# Patient Record
Sex: FEMALE | Race: OTHER | HISPANIC OR LATINO | Employment: OTHER | URBAN - METROPOLITAN AREA
[De-identification: names, ages, dates, MRNs, and addresses within clinical notes are randomized per-mention and may not be internally consistent; named-entity substitution may affect disease eponyms.]

---

## 2017-03-07 ENCOUNTER — LAB REQUISITION (OUTPATIENT)
Dept: LAB | Facility: HOSPITAL | Age: 52
End: 2017-03-07
Payer: COMMERCIAL

## 2017-03-07 ENCOUNTER — ALLSCRIPTS OFFICE VISIT (OUTPATIENT)
Dept: OTHER | Facility: OTHER | Age: 52
End: 2017-03-07

## 2017-03-07 DIAGNOSIS — C50.919 MALIGNANT NEOPLASM OF FEMALE BREAST (HCC): ICD-10-CM

## 2017-03-07 DIAGNOSIS — Z01.419 ENCOUNTER FOR GYNECOLOGICAL EXAMINATION WITHOUT ABNORMAL FINDING: ICD-10-CM

## 2017-03-07 PROCEDURE — 87624 HPV HI-RISK TYP POOLED RSLT: CPT | Performed by: OBSTETRICS & GYNECOLOGY

## 2017-03-07 PROCEDURE — 88175 CYTOPATH C/V AUTO FLUID REDO: CPT | Performed by: OBSTETRICS & GYNECOLOGY

## 2017-03-13 LAB — HPV RRNA GENITAL QL NAA+PROBE: NORMAL

## 2017-03-15 LAB
LAB AP GYN PRIMARY INTERPRETATION: NORMAL
Lab: NORMAL

## 2017-11-20 ENCOUNTER — APPOINTMENT (OUTPATIENT)
Dept: LAB | Facility: CLINIC | Age: 52
End: 2017-11-20
Payer: COMMERCIAL

## 2017-11-20 DIAGNOSIS — Z79.899 ENCOUNTER FOR LONG-TERM (CURRENT) USE OF MEDICATIONS: ICD-10-CM

## 2017-11-20 DIAGNOSIS — F41.1 GENERALIZED ANXIETY DISORDER: ICD-10-CM

## 2017-11-20 DIAGNOSIS — F31.32 BIPOLAR I DISORDER, MOST RECENT EPISODE DEPRESSED, MODERATE (HCC): Primary | ICD-10-CM

## 2017-11-20 LAB
25(OH)D3 SERPL-MCNC: 11 NG/ML (ref 30–100)
ALBUMIN SERPL BCP-MCNC: 3 G/DL (ref 3.5–5)
ALP SERPL-CCNC: 95 U/L (ref 46–116)
ALT SERPL W P-5'-P-CCNC: 29 U/L (ref 12–78)
ANION GAP SERPL CALCULATED.3IONS-SCNC: 7 MMOL/L (ref 4–13)
AST SERPL W P-5'-P-CCNC: 30 U/L (ref 5–45)
BASOPHILS # BLD AUTO: 0.03 THOUSANDS/ΜL (ref 0–0.1)
BASOPHILS NFR BLD AUTO: 0 % (ref 0–1)
BILIRUB SERPL-MCNC: 0.3 MG/DL (ref 0.2–1)
BUN SERPL-MCNC: 15 MG/DL (ref 5–25)
CALCIUM SERPL-MCNC: 8.9 MG/DL (ref 8.3–10.1)
CHLORIDE SERPL-SCNC: 101 MMOL/L (ref 100–108)
CHOLEST SERPL-MCNC: 148 MG/DL (ref 50–200)
CO2 SERPL-SCNC: 30 MMOL/L (ref 21–32)
CREAT SERPL-MCNC: 1.14 MG/DL (ref 0.6–1.3)
EOSINOPHIL # BLD AUTO: 0.17 THOUSAND/ΜL (ref 0–0.61)
EOSINOPHIL NFR BLD AUTO: 2 % (ref 0–6)
ERYTHROCYTE [DISTWIDTH] IN BLOOD BY AUTOMATED COUNT: 14 % (ref 11.6–15.1)
FERRITIN SERPL-MCNC: 70 NG/ML (ref 8–388)
FOLATE SERPL-MCNC: >20 NG/ML (ref 3.1–17.5)
GFR SERPL CREATININE-BSD FRML MDRD: 55 ML/MIN/1.73SQ M
GLUCOSE P FAST SERPL-MCNC: 63 MG/DL (ref 65–99)
GLUCOSE P FAST SERPL-MCNC: 64 MG/DL (ref 65–99)
HCT VFR BLD AUTO: 34.2 % (ref 34.8–46.1)
HDLC SERPL-MCNC: 35 MG/DL (ref 40–60)
HGB BLD-MCNC: 11.3 G/DL (ref 11.5–15.4)
IRON SATN MFR SERPL: 12 %
IRON SERPL-MCNC: 48 UG/DL (ref 50–170)
LDLC SERPL CALC-MCNC: 60 MG/DL (ref 0–100)
LYMPHOCYTES # BLD AUTO: 2.73 THOUSANDS/ΜL (ref 0.6–4.47)
LYMPHOCYTES NFR BLD AUTO: 29 % (ref 14–44)
MCH RBC QN AUTO: 30 PG (ref 26.8–34.3)
MCHC RBC AUTO-ENTMCNC: 33 G/DL (ref 31.4–37.4)
MCV RBC AUTO: 91 FL (ref 82–98)
MONOCYTES # BLD AUTO: 0.63 THOUSAND/ΜL (ref 0.17–1.22)
MONOCYTES NFR BLD AUTO: 7 % (ref 4–12)
NEUTROPHILS # BLD AUTO: 6 THOUSANDS/ΜL (ref 1.85–7.62)
NEUTS SEG NFR BLD AUTO: 62 % (ref 43–75)
NRBC BLD AUTO-RTO: 0 /100 WBCS
PLATELET # BLD AUTO: 294 THOUSANDS/UL (ref 149–390)
PMV BLD AUTO: 10.6 FL (ref 8.9–12.7)
POTASSIUM SERPL-SCNC: 3.4 MMOL/L (ref 3.5–5.3)
PROT SERPL-MCNC: 7.5 G/DL (ref 6.4–8.2)
RBC # BLD AUTO: 3.77 MILLION/UL (ref 3.81–5.12)
RETICS # AUTO: NORMAL 10*3/UL (ref 14097–95744)
RETICS # CALC: 1.76 % (ref 0.37–1.87)
SODIUM SERPL-SCNC: 138 MMOL/L (ref 136–145)
T3 SERPL-MCNC: 1.4 NG/ML (ref 0.6–1.8)
T4 FREE SERPL-MCNC: 0.89 NG/DL (ref 0.76–1.46)
TIBC SERPL-MCNC: 391 UG/DL (ref 250–450)
TRANSFERRIN SERPL-MCNC: 322 MG/DL (ref 200–400)
TRIGL SERPL-MCNC: 264 MG/DL
TSH SERPL DL<=0.05 MIU/L-ACNC: 7.22 UIU/ML (ref 0.36–3.74)
VIT B12 SERPL-MCNC: 405 PG/ML (ref 100–900)
WBC # BLD AUTO: 9.58 THOUSAND/UL (ref 4.31–10.16)

## 2017-11-20 PROCEDURE — 82746 ASSAY OF FOLIC ACID SERUM: CPT

## 2017-11-20 PROCEDURE — 84443 ASSAY THYROID STIM HORMONE: CPT

## 2017-11-20 PROCEDURE — 80074 ACUTE HEPATITIS PANEL: CPT

## 2017-11-20 PROCEDURE — 85025 COMPLETE CBC W/AUTO DIFF WBC: CPT

## 2017-11-20 PROCEDURE — 83550 IRON BINDING TEST: CPT

## 2017-11-20 PROCEDURE — 80053 COMPREHEN METABOLIC PANEL: CPT

## 2017-11-20 PROCEDURE — 84439 ASSAY OF FREE THYROXINE: CPT

## 2017-11-20 PROCEDURE — 82607 VITAMIN B-12: CPT

## 2017-11-20 PROCEDURE — 82306 VITAMIN D 25 HYDROXY: CPT

## 2017-11-20 PROCEDURE — 83540 ASSAY OF IRON: CPT

## 2017-11-20 PROCEDURE — 80307 DRUG TEST PRSMV CHEM ANLYZR: CPT

## 2017-11-20 PROCEDURE — 82951 GLUCOSE TOLERANCE TEST (GTT): CPT

## 2017-11-20 PROCEDURE — 84466 ASSAY OF TRANSFERRIN: CPT

## 2017-11-20 PROCEDURE — 82728 ASSAY OF FERRITIN: CPT

## 2017-11-20 PROCEDURE — 36415 COLL VENOUS BLD VENIPUNCTURE: CPT

## 2017-11-20 PROCEDURE — 80061 LIPID PANEL: CPT

## 2017-11-20 PROCEDURE — 84480 ASSAY TRIIODOTHYRONINE (T3): CPT

## 2017-11-20 PROCEDURE — 85045 AUTOMATED RETICULOCYTE COUNT: CPT

## 2017-11-21 LAB
HAV IGM SER QL: NORMAL
HBV CORE IGM SER QL: NORMAL
HBV SURFACE AG SER QL: NORMAL
HCV AB SER QL: NORMAL

## 2017-11-25 LAB
AMPHETAMINES UR QL SCN: NEGATIVE NG/ML
BARBITURATES UR QL SCN: NEGATIVE NG/ML
BENZODIAZ UR QL SCN: NEGATIVE NG/ML
BZE UR QL SCN: NEGATIVE NG/ML
CANNABINOIDS UR QL SCN: NEGATIVE NG/ML
METHADONE UR QL SCN: POSITIVE
OPIATES UR QL: NEGATIVE NG/ML
PCP UR QL: NEGATIVE NG/ML
PROPOXYPH UR QL: NEGATIVE NG/ML

## 2018-01-11 NOTE — MISCELLANEOUS
Provider Comments  Provider Comments:   Patient was a no show for appt today, called and spoke with patient she said she was not feeling good and would call us back when she could reschedule        Signatures   Electronically signed by : Melyssa Mann MD; Apr 25 2016  7:22AM EST

## 2018-01-12 VITALS
HEIGHT: 62 IN | DIASTOLIC BLOOD PRESSURE: 86 MMHG | SYSTOLIC BLOOD PRESSURE: 145 MMHG | BODY MASS INDEX: 31.1 KG/M2 | WEIGHT: 169 LBS | HEART RATE: 67 BPM

## 2018-01-22 ENCOUNTER — HOSPITAL ENCOUNTER (EMERGENCY)
Facility: HOSPITAL | Age: 53
Discharge: HOME | End: 2018-01-22
Admitting: EMERGENCY MEDICINE
Payer: COMMERCIAL

## 2018-01-22 VITALS
BODY MASS INDEX: 32.92 KG/M2 | DIASTOLIC BLOOD PRESSURE: 72 MMHG | WEIGHT: 180 LBS | HEART RATE: 70 BPM | TEMPERATURE: 98.2 F | SYSTOLIC BLOOD PRESSURE: 160 MMHG | OXYGEN SATURATION: 98 % | RESPIRATION RATE: 20 BRPM

## 2018-01-22 DIAGNOSIS — J40 BRONCHITIS: ICD-10-CM

## 2018-01-22 DIAGNOSIS — J45.901 ASTHMA EXACERBATION: Primary | ICD-10-CM

## 2018-01-22 PROCEDURE — 99283 EMERGENCY DEPT VISIT LOW MDM: CPT

## 2018-01-22 PROCEDURE — 94640 AIRWAY INHALATION TREATMENT: CPT

## 2018-01-22 RX ORDER — PREDNISONE 50 MG/1
50 TABLET ORAL DAILY
Qty: 4 TABLET | Refills: 0 | Status: SHIPPED | OUTPATIENT
Start: 2018-01-22 | End: 2018-11-30

## 2018-01-22 RX ORDER — ALBUTEROL SULFATE 90 UG/1
2 AEROSOL, METERED RESPIRATORY (INHALATION) EVERY 6 HOURS PRN
Qty: 1 INHALER | Refills: 0 | Status: SHIPPED | OUTPATIENT
Start: 2018-01-22

## 2018-01-22 RX ORDER — ALBUTEROL SULFATE 1.25 MG/3ML
1 SOLUTION RESPIRATORY (INHALATION) EVERY 6 HOURS PRN
Qty: 75 ML | Refills: 0 | Status: SHIPPED | OUTPATIENT
Start: 2018-01-22

## 2018-01-22 RX ADMIN — PREDNISONE 50 MG: 20 TABLET ORAL at 12:27

## 2018-01-22 RX ADMIN — ALBUTEROL SULFATE 2.5 MG: 2.5 SOLUTION RESPIRATORY (INHALATION) at 12:26

## 2018-01-22 RX ADMIN — IPRATROPIUM BROMIDE 0.5 MG: 0.5 SOLUTION RESPIRATORY (INHALATION) at 12:26

## 2018-01-22 NOTE — ED PROVIDER NOTES
History  Chief Complaint   Patient presents with    Asthma     Patient reports x2 days of worsening asthma symtoms and wheezing, patient reports she ran out of medications so she has not tried any at-home therapy; pt  denies fevers     64-year-old female with past medical history of asthma, hypertension, breast cancer, presents emergency department for chest tightness and wheezing for the past 2 days  Also endorses productive cough that started 2 days ago  Denies fevers, chills, bilateral ear pain, sore throat, facial pain, headaches, dizziness, chest pain, shortness of breath, palpitations, nausea, vomiting, diarrhea, abdominal pain  States that this is consistent with her asthma exacerbations in the past   She has never been intubated or hospitalized in the ICU for her asthma  History provided by:  Patient   used: No        None       Past Medical History:   Diagnosis Date    Asthma     Cancer ([facility] [address] )     breast cancer    Hypertension     Psychiatric disorder     depression and anxiety       History reviewed  No pertinent surgical history  History reviewed  No pertinent family history  I have reviewed and agree with the history as documented  Social History   Substance Use Topics    Smoking status: Current Every Day Smoker     Packs/day: 0 50     Types: Cigarettes    Smokeless tobacco: Never Used    Alcohol use No        Review of Systems   Constitutional: Negative for chills and fever  HENT: Negative  Respiratory: Positive for cough, chest tightness and wheezing  Negative for shortness of breath  Cardiovascular: Negative for chest pain, palpitations and leg swelling  Gastrointestinal: Negative  Musculoskeletal: Negative  Neurological: Negative          Physical Exam  ED Triage Vitals [01/22/18 1123]   Temperature Pulse Respirations Blood Pressure SpO2   98 2 °F (36 8 °C) 70 20 160/72 98 %      Temp Source Heart Rate Source Patient Position - Orthostatic VS BP Location FiO2 (%)   Oral Monitor Sitting Right arm --      Pain Score       No Pain           Orthostatic Vital Signs  Vitals:    01/22/18 1123   BP: 160/72   Pulse: 70   Patient Position - Orthostatic VS: Sitting       Physical Exam   Constitutional: She is oriented to person, place, and time  She appears well-developed and well-nourished  HENT:   Mouth/Throat: Oropharynx is clear and moist    Eyes: Conjunctivae and EOM are normal  Pupils are equal, round, and reactive to light  Neck: Normal range of motion  Neck supple  Cardiovascular: Normal rate, regular rhythm and intact distal pulses  Pulmonary/Chest: Effort normal  She has wheezes (Bilateral expiratory)  She has no rales  Abdominal: Soft  Bowel sounds are normal  She exhibits no distension  There is no tenderness  There is no rebound and no guarding  Musculoskeletal: Normal range of motion  She exhibits no edema or tenderness  Neurological: She is alert and oriented to person, place, and time  No cranial nerve deficit or sensory deficit  She exhibits normal muscle tone  Coordination normal    Skin: Skin is warm and dry  Capillary refill takes less than 2 seconds  Psychiatric: She has a normal mood and affect  Her behavior is normal    Nursing note and vitals reviewed        ED Medications  Medications   albuterol inhalation solution 2 5 mg (2 5 mg Nebulization Given 1/22/18 1226)   ipratropium (ATROVENT) 0 02 % inhalation solution 0 5 mg (0 5 mg Nebulization Given 1/22/18 1226)   predniSONE tablet 50 mg (50 mg Oral Given 1/22/18 1227)       Diagnostic Studies  Results Reviewed     None                 No orders to display              Procedures  Procedures       Phone Contacts  ED Phone Contact    ED Course  ED Course                                MDM  Number of Diagnoses or Management Options  Diagnosis management comments: 58-year-old female with past medical history of asthma, hypertension, breast cancer, presents emergency department for chest tightness and wheezing for the past 2 days  Differential Diagnosis includes but is not limited to:  Asthma exacerbation, bronchitis, viral URI, low suspicion for pneumonia  Given DuoNeb in the emergency department with prednisone  Patient feels much improved and will be discharged home with follow up primary care follow-up  CritCare Time    Disposition  Final diagnoses:   Asthma exacerbation   Bronchitis     Time reflects when diagnosis was documented in both MDM as applicable and the Disposition within this note     Time User Action Codes Description Comment    1/22/2018 12:47 PM Caro Schafer [W67 [id number]] Asthma exacerbation     1/22/2018 12:47 PM Brittnee, [address] Bronchitis       ED Disposition     ED Disposition Condition Comment    Discharge  Avanell Race discharge to home/self care  Condition at discharge: Good        Follow-up Information     Follow up With Specialties Details Why Contact Info    Gene Necessary, DO Internal Medicine In 3 days  [address] [facility]  [phone]          Patient's Medications   Discharge Prescriptions    ALBUTEROL (PROVENTIL HFA,VENTOLIN HFA) 90 MCG/ACT INHALER    Inhale 2 puffs every 6 (six) hours as needed for wheezing       Start Date: 1/22/2018 End Date: --       Order Dose: 2 puffs       Quantity: 1 Inhaler    Refills: 0    PREDNISONE 50 MG TABLET    Take 1 tablet by mouth daily       Start Date: 1/22/2018 End Date: --       Order Dose: 50 mg       Quantity: 4 tablet    Refills: 0     No discharge procedures on file      ED Provider  Electronically Signed by           Alyce Kee PA-C  01/22/18 4784

## 2018-01-22 NOTE — DISCHARGE INSTRUCTIONS
Take 2 puffs of the albuterol inhaler every 4-6 hours as needed for shortness of breath and cough  Take prednisone 1 tablet every day for the next 4 days, beginning tomorrow  Follow-up with primary care doctor in 3-5 days to ensure improvement  Return to emergency department for fever, chills, worsening cough, worsening shortness breath, chest pain,   Or any other concerns  Acute Bronchitis   WHAT YOU NEED TO KNOW:   Acute bronchitis is swelling and irritation in the air passages of your lungs  This irritation may cause you to cough or have other breathing problems  Acute bronchitis often starts because of another illness, such as a cold or the flu  The illness spreads from your nose and throat to your windpipe and airways  Bronchitis is often called a chest cold  Acute bronchitis lasts about 3 to 6 weeks and is usually not a serious illness  Your cough can last for several weeks  DISCHARGE INSTRUCTIONS:   Return to the emergency department if:   · You cough up blood  · Your lips or fingernails turn blue  · You feel like you are not getting enough air when you breathe  Contact your healthcare provider if:   · You have a fever  · Your breathing problems do not go away or get worse  · Your cough does not get better within 4 weeks  · You have questions or concerns about your condition or care  Self-care:   · Get more rest   Rest helps your body to heal  Slowly start to do more each day  Rest when you feel it is needed  · Avoid irritants in the air  Avoid chemicals, fumes, and dust  Wear a face mask if you must work around dust or fumes  Stay inside on days when air pollution levels are high  If you have allergies, stay inside when pollen counts are high  Do not use aerosol products, such as spray-on deodorant, bug spray, and hair spray  · Do not smoke or be around others who smoke    Nicotine and other chemicals in cigarettes and cigars damages the cilia that move mucus out of your lungs  Ask your healthcare provider for information if you currently smoke and need help to quit  E-cigarettes or smokeless tobacco still contain nicotine  Talk to your healthcare provider before you use these products  · Drink liquids as directed  Liquids help keep your air passages moist and help you cough up mucus  You may need to drink more liquids when you have acute bronchitis  Ask how much liquid to drink each day and which liquids are best for you  · Use a humidifier or vaporizer  Use a cool mist humidifier or a vaporizer to increase air moisture in your home  This may make it easier for you to breathe and help decrease your cough  Decrease risk for acute bronchitis:   · Get the vaccinations you need  Ask your healthcare provider if you should get vaccinated against the flu or pneumonia  · Prevent the spread of germs  You can decrease your risk of acute bronchitis and other illnesses by doing the following:     [facility] your hands often with soap and water  Carry germ-killing hand lotion or gel with you  You can use the lotion or gel to clean your hands when soap and water are not available  ¨ Do not touch your eyes, nose, or mouth unless you have washed your hands first     ¨ Always cover your mouth when you cough to prevent the spread of germs  It is best to cough into a tissue or your shirt sleeve instead of into your hand  Ask those around you cover their mouths when they cough  ¨ Try to avoid people who have a cold or the flu  If you are sick, stay away from others as much as possible  Medicines: Your healthcare provider may  give you any of the following:  · Ibuprofen or acetaminophen  are medicines that help lower your fever  They are available without a doctor's order  Ask your healthcare provider which medicine is right for you  Ask how much to take and how often to take it  Follow directions  These medicines can cause stomach bleeding if not taken correctly   Ibuprofen can cause kidney damage  Do not take ibuprofen if you have kidney disease, an ulcer, or allergies to aspirin  Acetaminophen can cause liver damage  Do not take more than 4,000 milligrams in 24 hours  · Decongestants  help loosen mucus in your lungs and make it easier to cough up  This can help you breathe easier  · Cough suppressants  decrease your urge to cough  If your cough produces mucus, do not take a cough suppressant unless your healthcare provider tells you to  Your healthcare provider may suggest that you take a cough suppressant at night so you can rest     · Inhalers  may be given  Your healthcare provider may give you one or more inhalers to help you breathe easier and cough less  An inhaler gives your medicine to open your airways  Ask your healthcare provider to show you how to use your inhaler correctly  · Take your medicine as directed  Contact your healthcare provider if you think your medicine is not helping or if you have side effects  Tell him of her if you are allergic to any medicine  Keep a list of the medicines, vitamins, and herbs you take  Include the amounts, and when and why you take them  Bring the list or the pill bottles to follow-up visits  Carry your medicine list with you in case of an emergency  Follow up with your healthcare provider as directed:  Write down questions you have so you will remember to ask them during your follow-up visits  © 2017 [address] Information is for End User's use only and may not be sold, redistributed or otherwise used for commercial purposes  All illustrations and images included in CareNotes® are the copyrighted property of A D A M , Inc  or Jose E Gonzalez  The above information is an  only  It is not intended as medical advice for individual conditions or treatments  Talk to your doctor, nurse or pharmacist before following any medical regimen to see if it is safe and effective for you      Asthma, Ambulatory Care   GENERAL INFORMATION:   Asthma  is a lung disease that makes breathing difficult  Chronic inflammation and reactions to triggers narrow the airways in your lungs  Asthma can become life-threatening if it is not managed  Common symptoms include the following:   · Coughing     · Wheezing     · Shortness of breath     · Chest tightness  Seek immediate care for the following symptoms:   · Severe shortness of breath    · Blue or gray lips or nails    · Skin around your neck and ribs pulls in with each breath    · Shortness of breath, even after you take your short-term medicine as directed     · Peak flow numbers in the red zone of your asthma action plan  Treatment for asthma  will depend on how severe it is  Medicine may decrease inflammation, open airways, and make it easier to breathe  Medicines may be inhaled, taken as a pill, or injected  Short-term medicines relieve your symptoms quickly  Long-term medicines are used to prevent future attacks  You may also need medicine to help control your allergies  Manage and prevent future asthma attacks:   · Follow your asthma action pan  This is a written plan that you and your healthcare provider create  It explains which medicine you need and when to change doses if necessary  It also explains how you can monitor symptoms and use a peak flow meter  The meter measures how well your lungs are working  · Manage other health conditions , such as allergies, acid reflux, and sleep apnea  · Identify and avoid triggers  These may include pets, dust mites, mold, and cockroaches  · Do not smoke and avoid others who smoke  If you smoke, it is never too late to quit  Ask your healthcare provider if you need help quitting  · Ask about a flu vaccine  The flu can make your asthma worse  You may need a yearly flu shot  Follow up with your healthcare provider as directed:   You will need to return to make sure your medicine is working and your symptoms are controlled  You may be referred to an asthma or allergy specialist  Matteokunal Hope may be asked to keep a record of your peak flow values and bring it with you to your appointments  Write down your questions so you remember to ask them during your visits  CARE AGREEMENT:   You have the right to help plan your care  Learn about your health condition and how it may be treated  Discuss treatment options with your caregivers to decide what care you want to receive  You always have the right to refuse treatment  The above information is an  only  It is not intended as medical advice for individual conditions or treatments  Talk to your doctor, nurse or pharmacist before following any medical regimen to see if it is safe and effective for you  © 2014 [address] is for End User's use only and may not be sold, redistributed or otherwise used for commercial purposes  All illustrations and images included in CareNotes® are the copyrighted property of A D A M , Inc  or Jose E Gonzalez

## 2018-10-01 ENCOUNTER — APPOINTMENT (OUTPATIENT)
Dept: LAB | Facility: CLINIC | Age: 53
End: 2018-10-01
Payer: COMMERCIAL

## 2018-10-01 DIAGNOSIS — F31.32 MODERATE DEPRESSED BIPOLAR I DISORDER (HCC): ICD-10-CM

## 2018-10-01 DIAGNOSIS — Z79.899 ENCOUNTER FOR LONG-TERM (CURRENT) USE OF MEDICATIONS: Primary | ICD-10-CM

## 2018-10-01 LAB
25(OH)D3 SERPL-MCNC: 25 NG/ML (ref 30–100)
ALBUMIN SERPL BCP-MCNC: 3.1 G/DL (ref 3.5–5)
ALP SERPL-CCNC: 109 U/L (ref 46–116)
ALT SERPL W P-5'-P-CCNC: 85 U/L (ref 12–78)
ANION GAP SERPL CALCULATED.3IONS-SCNC: 6 MMOL/L (ref 4–13)
AST SERPL W P-5'-P-CCNC: 81 U/L (ref 5–45)
BASOPHILS # BLD AUTO: 0.02 THOUSANDS/ΜL (ref 0–0.1)
BASOPHILS NFR BLD AUTO: 0 % (ref 0–1)
BILIRUB SERPL-MCNC: 0.31 MG/DL (ref 0.2–1)
BUN SERPL-MCNC: 20 MG/DL (ref 5–25)
CALCIUM SERPL-MCNC: 8.9 MG/DL (ref 8.3–10.1)
CHLORIDE SERPL-SCNC: 101 MMOL/L (ref 100–108)
CHOLEST SERPL-MCNC: 147 MG/DL (ref 50–200)
CO2 SERPL-SCNC: 28 MMOL/L (ref 21–32)
CREAT SERPL-MCNC: 0.96 MG/DL (ref 0.6–1.3)
EOSINOPHIL # BLD AUTO: 0.13 THOUSAND/ΜL (ref 0–0.61)
EOSINOPHIL NFR BLD AUTO: 1 % (ref 0–6)
ERYTHROCYTE [DISTWIDTH] IN BLOOD BY AUTOMATED COUNT: 13.6 % (ref 11.6–15.1)
EST. AVERAGE GLUCOSE BLD GHB EST-MCNC: 117 MG/DL
FERRITIN SERPL-MCNC: 111 NG/ML (ref 8–388)
FOLATE SERPL-MCNC: >20 NG/ML (ref 3.1–17.5)
GFR SERPL CREATININE-BSD FRML MDRD: 67 ML/MIN/1.73SQ M
GGT SERPL-CCNC: 49 U/L (ref 5–85)
GLUCOSE P FAST SERPL-MCNC: 78 MG/DL (ref 65–99)
GLUCOSE P FAST SERPL-MCNC: 78 MG/DL (ref 65–99)
HBA1C MFR BLD: 5.7 % (ref 4.2–6.3)
HCT VFR BLD AUTO: 36.4 % (ref 34.8–46.1)
HDLC SERPL-MCNC: 47 MG/DL (ref 40–60)
HGB BLD-MCNC: 11.7 G/DL (ref 11.5–15.4)
IMM GRANULOCYTES # BLD AUTO: 0.02 THOUSAND/UL (ref 0–0.2)
IMM GRANULOCYTES NFR BLD AUTO: 0 % (ref 0–2)
IRON SATN MFR SERPL: 15 %
IRON SERPL-MCNC: 62 UG/DL (ref 50–170)
LDLC SERPL CALC-MCNC: 66 MG/DL (ref 0–100)
LYMPHOCYTES # BLD AUTO: 2.8 THOUSANDS/ΜL (ref 0.6–4.47)
LYMPHOCYTES NFR BLD AUTO: 30 % (ref 14–44)
MCH RBC QN AUTO: 29.7 PG (ref 26.8–34.3)
MCHC RBC AUTO-ENTMCNC: 32.1 G/DL (ref 31.4–37.4)
MCV RBC AUTO: 92 FL (ref 82–98)
MONOCYTES # BLD AUTO: 0.56 THOUSAND/ΜL (ref 0.17–1.22)
MONOCYTES NFR BLD AUTO: 6 % (ref 4–12)
NEUTROPHILS # BLD AUTO: 5.73 THOUSANDS/ΜL (ref 1.85–7.62)
NEUTS SEG NFR BLD AUTO: 63 % (ref 43–75)
NONHDLC SERPL-MCNC: 100 MG/DL
NRBC BLD AUTO-RTO: 0 /100 WBCS
PLATELET # BLD AUTO: 250 THOUSANDS/UL (ref 149–390)
PMV BLD AUTO: 11.6 FL (ref 8.9–12.7)
POTASSIUM SERPL-SCNC: 4 MMOL/L (ref 3.5–5.3)
PROT SERPL-MCNC: 7.6 G/DL (ref 6.4–8.2)
RBC # BLD AUTO: 3.94 MILLION/UL (ref 3.81–5.12)
RETICS # AUTO: NORMAL 10*3/UL (ref 14097–95744)
RETICS # CALC: 1.65 % (ref 0.37–1.87)
SODIUM SERPL-SCNC: 135 MMOL/L (ref 136–145)
T3 SERPL-MCNC: 1.6 NG/ML (ref 0.6–1.8)
T4 FREE SERPL-MCNC: 1.01 NG/DL (ref 0.76–1.46)
TIBC SERPL-MCNC: 405 UG/DL (ref 250–450)
TRANSFERRIN SERPL-MCNC: 313 MG/DL (ref 200–400)
TRIGL SERPL-MCNC: 168 MG/DL
TSH SERPL DL<=0.05 MIU/L-ACNC: 1.34 UIU/ML (ref 0.36–3.74)
VIT B12 SERPL-MCNC: 509 PG/ML (ref 100–900)
WBC # BLD AUTO: 9.26 THOUSAND/UL (ref 4.31–10.16)

## 2018-10-01 PROCEDURE — 36415 COLL VENOUS BLD VENIPUNCTURE: CPT

## 2018-10-01 PROCEDURE — 83550 IRON BINDING TEST: CPT

## 2018-10-01 PROCEDURE — 80061 LIPID PANEL: CPT

## 2018-10-01 PROCEDURE — 82977 ASSAY OF GGT: CPT

## 2018-10-01 PROCEDURE — 82951 GLUCOSE TOLERANCE TEST (GTT): CPT

## 2018-10-01 PROCEDURE — 80053 COMPREHEN METABOLIC PANEL: CPT

## 2018-10-01 PROCEDURE — 82746 ASSAY OF FOLIC ACID SERUM: CPT

## 2018-10-01 PROCEDURE — 80074 ACUTE HEPATITIS PANEL: CPT

## 2018-10-01 PROCEDURE — 82607 VITAMIN B-12: CPT

## 2018-10-01 PROCEDURE — 85045 AUTOMATED RETICULOCYTE COUNT: CPT

## 2018-10-01 PROCEDURE — 82728 ASSAY OF FERRITIN: CPT

## 2018-10-01 PROCEDURE — 84466 ASSAY OF TRANSFERRIN: CPT

## 2018-10-01 PROCEDURE — 84443 ASSAY THYROID STIM HORMONE: CPT

## 2018-10-01 PROCEDURE — 83540 ASSAY OF IRON: CPT

## 2018-10-01 PROCEDURE — 83036 HEMOGLOBIN GLYCOSYLATED A1C: CPT

## 2018-10-01 PROCEDURE — 82306 VITAMIN D 25 HYDROXY: CPT

## 2018-10-01 PROCEDURE — 85025 COMPLETE CBC W/AUTO DIFF WBC: CPT

## 2018-10-01 PROCEDURE — 80307 DRUG TEST PRSMV CHEM ANLYZR: CPT

## 2018-10-01 PROCEDURE — 84480 ASSAY TRIIODOTHYRONINE (T3): CPT

## 2018-10-01 PROCEDURE — 84439 ASSAY OF FREE THYROXINE: CPT

## 2018-10-05 LAB
AMPHETAMINES UR QL SCN: NEGATIVE NG/ML
BARBITURATES UR QL SCN: NEGATIVE NG/ML
BENZODIAZ UR QL SCN: NEGATIVE NG/ML
BZE UR QL: NEGATIVE NG/ML
CANNABINOIDS UR QL SCN: NEGATIVE NG/ML
METHADONE UR QL SCN: POSITIVE
OPIATES UR QL: NEGATIVE NG/ML
PCP UR QL: NEGATIVE NG/ML
PROPOXYPH UR QL: NEGATIVE NG/ML

## 2018-11-30 ENCOUNTER — OFFICE VISIT (OUTPATIENT)
Dept: SURGICAL ONCOLOGY | Facility: CLINIC | Age: 53
End: 2018-11-30
Payer: COMMERCIAL

## 2018-11-30 VITALS
BODY MASS INDEX: 32.47 KG/M2 | RESPIRATION RATE: 18 BRPM | WEIGHT: 172 LBS | SYSTOLIC BLOOD PRESSURE: 162 MMHG | HEART RATE: 94 BPM | HEIGHT: 61 IN | DIASTOLIC BLOOD PRESSURE: 110 MMHG | TEMPERATURE: 98 F | OXYGEN SATURATION: 96 %

## 2018-11-30 DIAGNOSIS — Z17.0 MALIGNANT NEOPLASM OF RIGHT BREAST IN FEMALE, ESTROGEN RECEPTOR POSITIVE, UNSPECIFIED SITE OF BREAST (HCC): Primary | ICD-10-CM

## 2018-11-30 DIAGNOSIS — C50.911 MALIGNANT NEOPLASM OF RIGHT BREAST IN FEMALE, ESTROGEN RECEPTOR POSITIVE, UNSPECIFIED SITE OF BREAST (HCC): Primary | ICD-10-CM

## 2018-11-30 PROBLEM — J45.909 ASTHMA: Status: ACTIVE | Noted: 2018-11-30

## 2018-11-30 PROBLEM — C50.919 BREAST CANCER (HCC): Status: ACTIVE | Noted: 2018-11-30

## 2018-11-30 PROCEDURE — 99214 OFFICE O/P EST MOD 30 MIN: CPT | Performed by: SURGERY

## 2018-11-30 RX ORDER — HYDROCHLOROTHIAZIDE 25 MG/1
25 TABLET ORAL DAILY
COMMUNITY

## 2018-11-30 RX ORDER — ASPIRIN 81 MG/1
81 TABLET ORAL DAILY
COMMUNITY

## 2018-11-30 RX ORDER — TAMOXIFEN CITRATE 20 MG/1
20 TABLET ORAL 2 TIMES DAILY
COMMUNITY
End: 2019-08-12 | Stop reason: ALTCHOICE

## 2018-11-30 NOTE — PROGRESS NOTES
Surgical Oncology Follow Up       [address] [facility] [place]  [address] [facility] [address]  [place] [address] [address] [phone]    [facility] [address]  1965  [id number]  [address] [facility] [place]  [address] [facility] [address]  [facility] [address] [address] [phone]    Diagnoses and all orders for this visit:    Malignant neoplasm of right breast in female, estrogen receptor positive, unspecified site of breast ([facility] [address] )  -     Mammo screening left w 3d & cad; Future  -     Ambulatory referral to Hematology / Oncology; Future        Chief Complaint   Patient presents with    Follow-up     prior breast ca pt -  last seen August 2015  She would like to re-establish care  Return in about 1 year (around 11/30/2019) for Office Visit  No history exists  Staging: T2N1M0 right breast cancer, December 2013  ER/VA positive, HER2 Herrera negative  Treatment history:  Right modified radical mastectomy, December 2013  Chemotherapy followed by tamoxifen  Radiation  Current treatment:  Observation  Disease status:  EDIS    History of Present Illness:  Patient returns in follow-up of her breast cancer  She has not been seen here in over 3 years  She denies any new abdominal pain, nausea, vomiting, back pain, bone pain, headaches, or cough  She tells me she is still taking the chemotherapy pill  When questioned, she thinks this is tamoxifen  She has not had a mammogram for over 2 years as well  Review of Systems  Complete ROS Surg Onc:   Complete ROS Surg Onc:   Constitutional: The patient denies new or recent history of general fatigue, no recent weight loss, no change in appetite  Eyes: No complaints of visual problems, no scleral icterus  ENT: no complaints of ear pain, no hoarseness, no difficulty swallowing,  no tinnitus and no new masses in head, oral cavity, or neck     Cardiovascular: No complaints of chest pain, no palpitations, no ankle edema  Respiratory: No complaints of shortness of breath, no cough  Gastrointestinal: No complaints of jaundice, no bloody stools, no pale stools  Genitourinary: No complaints of dysuria, no hematuria, no nocturia, no frequent urination, no urethral discharge  Musculoskeletal: No complaints of weakness, paralysis, joint stiffness or arthralgias  Integumentary: No complaints of rash, no new lesions  Neurological: No complaints of convulsions, no seizures, no dizziness  Hematologic/Lymphatic: No complaints of easy bruising  Endocrine:  No hot or cold intolerance  No polydipsia, polyphagia, or polyuria  Allergy/immunology:  No environmental allergies  No food allergies  Not immunocompromised  Skin:  No pallor or rash  No wound  Patient Active Problem List   Diagnosis    Breast cancer ([place] [address] )    Asthma     Past Medical History:   Diagnosis Date    Asthma     Breast cancer ([place] [address] )     Cancer ([place] [address] )     breast cancer    Hypertension     Psychiatric disorder     depression and anxiety     Past Surgical History:   Procedure Laterality Date    MASTECTOMY  2014    Right side     History reviewed  No pertinent family history  Social History     Social History    Marital status: Single     Spouse name: N/A    Number of children: N/A    Years of education: N/A     Occupational History    Not on file       Social History Main Topics    Smoking status: Current Every Day Smoker     Packs/day: 0 50     Types: Cigarettes    Smokeless tobacco: Never Used    Alcohol use No    Drug use: No    Sexual activity: Not on file     Other Topics Concern    Not on file     Social History Narrative    No narrative on file       Current Outpatient Prescriptions:     albuterol (ACCUNEB) 1 25 MG/3ML nebulizer solution, Take 3 mL by nebulization every 6 (six) hours as needed for wheezing, Disp: 75 mL, Rfl: 0    albuterol (PROVENTIL HFA,VENTOLIN HFA) 90 mcg/act inhaler, Inhale 2 puffs every 6 (six) hours as needed for wheezing, Disp: 1 Inhaler, Rfl: 0  No Known Allergies  Vitals:    11/30/18 1349   BP: (!) 162/110   Pulse: 94   Resp: 18   Temp: 98 °F (36 7 °C)   SpO2: 96%       Physical Exam  Constitutional: General appearance: The Patient is well-developed and well-nourished who appears the stated age in no acute distress  Patient is pleasant and talkative  HEENT:  Normocephalic  Sclerae are anicteric  Mucous membranes are moist  Neck is supple without adenopathy  No JVD  Chest: The lungs are clear to auscultation  Cardiac: Heart is regular rate  Abdomen: Abdomen is soft, non-tender, non-distended and without masses  Extremities: There is no clubbing or cyanosis  There is no edema  Symmetric  Neuro: Grossly nonfocal  Gait is normal      Lymphatic: No evidence of cervical adenopathy bilaterally  No evidence of axillary adenopathy bilaterally  No evidence of inguinal adenopathy bilaterally  Skin: Warm, anicteric  Psych:  Patient is pleasant and talkative  Breasts:  She has a well-healed mastectomy on the right  No evidence of local recurrence  Left breast has no dominant masses, skin changes, or nipple discharge  No axillary or supraclavicular adenopathy bilaterally  Pathology:      Labs:      Imaging  No results found  I reviewed the above laboratory and imaging data  Discussion/Summary:  20-year-old female status post modified radical mastectomy for a T2N1M0, ER/MN, HER2 Herrera negative breast cancer  She has completed chemo radiation and continues hormonal therapy  There is no evidence of recurrence by physical exam, or symptomatology at nearly 5 years  I will order a mammogram now  Assuming this is negative, I will call her with the results    I will plan on seeing her back in 1 year with a repeat mammogram   Since she has not followed up with Medical Oncology and she is nearly 5 years out from her diagnosis we will have her set up to see Dr Tao Cheema to see when she can stop her hormonal therapy  I told her to continue her time oxygen at this time since she is tolerating it well  She is agreeable to this plan  All her questions were answered

## 2018-11-30 NOTE — LETTER
November 30, 2018     Hank Bashir MD  [address]    Patient: Reilly Mcdonald   YOB: 1965   Date of Visit: 11/30/2018       Dear Dr Caty Pritchard:    Thank you for referring Reilly Mcdonald to me for evaluation  Below are my notes for this consultation  If you have questions, please do not hesitate to call me  I look forward to following your patient along with you  Sincerely,        Yana Goff MD        CC: No Recipients  Yana Goff MD  11/30/2018  2:15 PM  Sign at close encounter               Surgical Oncology Follow Up       [place] [id number]  [address]  [facility] [address] [place] [address] [address]     Reilly Persaudon  1965  [id number]  [address] [facility]  [address]  [facility] [address]   [phone]    Diagnoses and all orders for this visit:    Malignant neoplasm of right breast in female, estrogen receptor positive, unspecified site of breast ([facility] [address] )  -     Mammo screening left w 3d & cad; Future  -     Ambulatory referral to Hematology / Oncology; Future        Chief Complaint   Patient presents with    Follow-up     prior breast ca pt -  last seen August 2015  She would like to re-establish care  Return in about 1 year (around 11/30/2019) for Office Visit  No history exists  Staging: T2N1M0 right breast cancer, December 2013  ER/SD positive, HER2 Herrera negative  Treatment history:  Right modified radical mastectomy, December 2013  Chemotherapy followed by tamoxifen  Radiation  Current treatment:  Observation  Disease status:  EDIS    History of Present Illness:  Patient returns in follow-up of her breast cancer  She has not been seen here in over 3 years  She denies any new abdominal pain, nausea, vomiting, back pain, bone pain, headaches, or cough    She tells me she is still taking the chemotherapy pill  When questioned, she thinks this is tamoxifen  She has not had a mammogram for over 2 years as well  Review of Systems  Complete ROS Surg Onc:   Complete ROS Surg Onc:   Constitutional: The patient denies new or recent history of general fatigue, no recent weight loss, no change in appetite  Eyes: No complaints of visual problems, no scleral icterus  ENT: no complaints of ear pain, no hoarseness, no difficulty swallowing,  no tinnitus and no new masses in head, oral cavity, or neck  Cardiovascular: No complaints of chest pain, no palpitations, no ankle edema  Respiratory: No complaints of shortness of breath, no cough  Gastrointestinal: No complaints of jaundice, no bloody stools, no pale stools  Genitourinary: No complaints of dysuria, no hematuria, no nocturia, no frequent urination, no urethral discharge  Musculoskeletal: No complaints of weakness, paralysis, joint stiffness or arthralgias  Integumentary: No complaints of rash, no new lesions  Neurological: No complaints of convulsions, no seizures, no dizziness  Hematologic/Lymphatic: No complaints of easy bruising  Endocrine:  No hot or cold intolerance  No polydipsia, polyphagia, or polyuria  Allergy/immunology:  No environmental allergies  No food allergies  Not immunocompromised  Skin:  No pallor or rash  No wound  Patient Active Problem List   Diagnosis    Breast cancer ([facility] )    Asthma     Past Medical History:   Diagnosis Date    Asthma     [facility]     Cancer ([facility] )     breast cancer    Hypertension     Psychiatric disorder     depression and anxiety     Past Surgical History:   Procedure Laterality Date    MASTECTOMY  2014    Right side     History reviewed  No pertinent family history    Social History     Social History    Marital status: Single     Spouse name: N/A    Number of children: N/A    Years of education: N/A     Occupational History    Not on file      Social History Main Topics    Smoking status: Current Every Day Smoker     Packs/day: 0 50     Types: Cigarettes    Smokeless tobacco: Never Used    Alcohol use No    Drug use: No    Sexual activity: Not on file     Other Topics Concern    Not on file     Social History Narrative    No narrative on file       Current Outpatient Prescriptions:     albuterol (ACCUNEB) 1 25 MG/3ML nebulizer solution, Take 3 mL by nebulization every 6 (six) hours as needed for wheezing, Disp: 75 mL, Rfl: 0    albuterol (PROVENTIL HFA,VENTOLIN HFA) 90 mcg/act inhaler, Inhale 2 puffs every 6 (six) hours as needed for wheezing, Disp: 1 Inhaler, Rfl: 0  No Known Allergies  Vitals:    11/30/18 1349   BP: (!) 162/110   Pulse: 94   Resp: 18   Temp: 98 °F (36 7 °C)   SpO2: 96%       Physical Exam  Constitutional: General appearance: The Patient is well-developed and well-nourished who appears the stated age in no acute distress  Patient is pleasant and talkative  HEENT:  Normocephalic  Sclerae are anicteric  Mucous membranes are moist  Neck is supple without adenopathy  No JVD  Chest: The lungs are clear to auscultation  Cardiac: Heart is regular rate  Abdomen: Abdomen is soft, non-tender, non-distended and without masses  Extremities: There is no clubbing or cyanosis  There is no edema  Symmetric  Neuro: Grossly nonfocal  Gait is normal      Lymphatic: No evidence of cervical adenopathy bilaterally  No evidence of axillary adenopathy bilaterally  No evidence of inguinal adenopathy bilaterally  Skin: Warm, anicteric  Psych:  Patient is pleasant and talkative  Breasts:  She has a well-healed mastectomy on the right  No evidence of local recurrence  Left breast has no dominant masses, skin changes, or nipple discharge  No axillary or supraclavicular adenopathy bilaterally  Pathology:      Labs:      Imaging  No results found    I reviewed the above laboratory and imaging data     Discussion/Summary:  49-year-old female status post modified radical mastectomy for a T2N1M0, ER/NM, HER2 Herrera negative breast cancer  She has completed chemo radiation and continues hormonal therapy  There is no evidence of recurrence by physical exam, or symptomatology at nearly 5 years  I will order a mammogram now  Assuming this is negative, I will call her with the results  I will plan on seeing her back in 1 year with a repeat mammogram   Since she has not followed up with Medical Oncology and she is nearly 5 years out from her diagnosis we will have her set up to see Dr Conchis Gilbert to see when she can stop her hormonal therapy  I told her to continue her time oxygen at this time since she is tolerating it well  She is agreeable to this plan  All her questions were answered

## 2019-01-10 ENCOUNTER — CONSULT (OUTPATIENT)
Dept: HEMATOLOGY ONCOLOGY | Facility: CLINIC | Age: 54
End: 2019-01-10
Payer: COMMERCIAL

## 2019-01-10 ENCOUNTER — HOSPITAL ENCOUNTER (OUTPATIENT)
Dept: MAMMOGRAPHY | Facility: CLINIC | Age: 54
Discharge: HOME | End: 2019-01-10

## 2019-01-10 VITALS
HEIGHT: 61 IN | HEART RATE: 94 BPM | TEMPERATURE: 96.6 F | DIASTOLIC BLOOD PRESSURE: 98 MMHG | WEIGHT: 173 LBS | RESPIRATION RATE: 18 BRPM | SYSTOLIC BLOOD PRESSURE: 144 MMHG | OXYGEN SATURATION: 94 % | BODY MASS INDEX: 32.66 KG/M2

## 2019-01-10 DIAGNOSIS — C50.911 MALIGNANT NEOPLASM OF RIGHT BREAST IN FEMALE, ESTROGEN RECEPTOR POSITIVE, UNSPECIFIED SITE OF BREAST (HCC): Primary | ICD-10-CM

## 2019-01-10 DIAGNOSIS — Z17.0 MALIGNANT NEOPLASM OF RIGHT BREAST IN FEMALE, ESTROGEN RECEPTOR POSITIVE, UNSPECIFIED SITE OF BREAST (HCC): Primary | ICD-10-CM

## 2019-01-10 PROCEDURE — 99204 OFFICE O/P NEW MOD 45 MIN: CPT | Performed by: INTERNAL MEDICINE

## 2019-01-10 RX ORDER — OMEPRAZOLE 20 MG/1
20 CAPSULE, DELAYED RELEASE ORAL DAILY
COMMUNITY

## 2019-01-10 RX ORDER — MELATONIN
1000 DAILY
COMMUNITY

## 2019-01-10 RX ORDER — BUSPIRONE HYDROCHLORIDE 10 MG/1
10 TABLET ORAL 2 TIMES DAILY
COMMUNITY

## 2019-01-10 RX ORDER — BUPROPION HYDROCHLORIDE 75 MG/1
75 TABLET ORAL ONCE
COMMUNITY

## 2019-01-10 NOTE — PROGRESS NOTES
Hematology / Oncology Outpatient Consult Note    Riley Rainey 52 y o  female [id number]         Date:  1/10/2019    Assessment / Plan:  A 52year old premenopausal woman with stage IIB right breast cancer, grade 3, ER/[place] positive, HER-2 negative disease  She had one positive lymph node  She underwent mastectomy with axillary lymph node dissection, without reconstruction resulting in the EDIS  She has very high Oncotype DX recurrence score was 61  She completed adjuvant chemotherapy with a c  followed by paclitaxel in June 2014  She started adjuvant hormonal therapy with tamoxifen in August 2014  Clinically, she has no evidence recurrent disease  I recommended her to continue with tamoxifen 20 mg daily until August 2019 at which time I am going to see her again  Most likely, she is going to be postmenopausal by then  Therefore, I will strongly consider switching hormonal therapy to aromatase inhibitor  She has residual neuropathy for which duloxetine may be an option once she switch her hormonal therapy to AI  She is in agreement with my recommendations  Subjective:     HPI:  A 46year old perimenopausal woman, who was found to have matted right breast  She underwent ultrasound-guided biopsy in October 16, 2013 which showed invasive ductal carcinoma  She was referred to Dr Chamberlain Ramon  She underwent mastectomy without reconstruction as well as right axillary lymph node dissection in December 11, 2013  Mastectomy specimen showed 3 8 cm invasive ductal carcinoma with lymphovascular invasion  this was grade 3  One out of 51, axillary lymph node positive for metastatic disease with 1 cm of tumor deposit  Previously, she underwent CT scan of chest, abdomen, pelvis, which showed no evidence of metastatic disease  She presents today to discuss adjuvant treatment options  She has several comorbidities including bipolar disorder, depression, and anxiety  She has a history of stroke 3 times   However, she does not appear to have any residual neurological deficit  She has no complaint of pain  She is to have mild limitation of range of motion of right shoulder after the surgery  she has no weight lossor any respiratory symptoms  Her performance status is normal  Her last menstrual cycle was 2 months ago  She has some hot flashes and some sweating  Interval History:  A 52year-old premenopausal woman with stage IIB right breast cancer  She had approximately 3 8 cm invasive ductal carcinoma, grade 3  This was ER/WA positive, HER-2 negative disease  She had 1out of 51 positive axillary lymph node with tumor deposit 1 cm  she underwent mastectomy with axillary lymph node dissection resulting in EDIS, followed by adjuvant chemotherapy  Her Oncotype DX score was 61, which is high risk  She was treated with adjuvant chemotherapy with dose dense AC followed by paclitaxel, which was completed in June 2014  She had regular menstrual cycles until February 2014  She started hormonal treatment with tamoxifen in August 2014  She also completed postmastectomy radiation therapy  She has lost in follow-up since 2015  However, she continued on tamoxifen 20 mg daily  She has occasional hot flashes  She has absolutely no menstrual cycle since adjuvant chemotherapy  She denied any pain or respiratory symptoms  Her weight is stable  She is to have moderate residual neuropathy  Her performance status is normal     Objective:     Primary Diagnosis:    Right breast cancer  Stage IIB(pT2, pN1a, M0)  Grade 3  ER/WA positive, HER-2 negative disease  Oncotype DX recurrence score 61  Diagnosed in December 2013  Cancer Staging:  Cancer Staging  No matching staging information was found for the patient  Previous Hematologic/ Oncologic Treatment:     1  Right mastectomy and axillary lymph node dissection  2  adjuvant chemotherapy with dose dense a c  X4 followed by weekly paclitaxel x12   Completed in June 2014     Current Hematologic/ Oncologic Treatment:      Adjuvant hormonal therapy with tamoxifen 20 mg once a day since August 2014  Disease Status:     EDIS status post mastectomy with axillary lymph node dissection  Test Results:    Pathology:    3 8 cm invasive ductal carcinoma with lymphovascular invasion  Grade 3  One out of 51, axillary lymph node positive for metastatic disease with 1 cm of tumor deposit  ER/CT positive, HER-2 negative disease  Stage IIB (pT2, pN1a, M0)  Oncotype DX recurrence score, 61  Radiology:    Mammography in January 2015 was benign  BI-RADS 2  Laboratory:        Physical Exam:      General Appearance:    Alert, oriented        Eyes:    PERRL   Ears:    Normal external ear canals, both ears   Nose:   Nares normal, septum midline   Throat:   Mucosa moist  Pharynx without injection  Neck:   Supple       Lungs:     Clear to auscultation bilaterally   Chest Wall:    No tenderness or deformity    Heart:    Regular rate and rhythm       Abdomen:     Soft, non-tender, bowel sounds +, no organomegaly           Extremities:   Extremities no cyanosis or edema       Skin:   no rash or icterus  Lymph nodes:   Cervical, supraclavicular, and axillary nodes normal   Neurologic:   CNII-XII intact, normal strength, sensation and reflexes     Throughout          Breast exam:   status post right mastectomy without reconstruction  No palpable abnormalities in the right chest wall  Left breast exam is negative  ROS: Review of Systems   Neurological:        Neuropathy   All other systems reviewed and are negative  Imaging: No results found        Labs:   Lab Results   Component Value Date    WBC 9 26 10/01/2018    HGB 11 7 10/01/2018    HCT 36 4 10/01/2018    MCV 92 10/01/2018     10/01/2018     Lab Results   Component Value Date     07/30/2014    K 4 0 10/01/2018     10/01/2018    CO2 28 10/01/2018    ANIONGAP 8 07/30/2014    BUN 20 10/01/2018 CREATININE 0 96 10/01/2018    GLUCOSE 102 07/30/2014    GLUF 78 10/01/2018    GLUF 78 10/01/2018    CALCIUM 8 9 10/01/2018    AST 81 (H) 10/01/2018    ALT 85 (H) 10/01/2018    ALKPHOS 109 10/01/2018    PROT 6 7 07/30/2014    BILITOT 0 18 (L) 07/30/2014    EGFR 67 10/01/2018         Lab Results   Component Value Date    IRON 62 10/01/2018    TIBC 405 10/01/2018    FERRITIN 111 10/01/2018       Lab Results   Component Value Date    WZTCFDHR75 150 10/01/2018       Lab Results   Component Value Date    FOLATE >20 0 (H) 10/01/2018           Vital Sign:    Body surface area is 1 78 meters squared  Wt Readings from Last 3 Encounters:   01/10/19 78 5 kg (173 lb)   11/30/18 78 kg (172 lb)   01/22/18 81 6 kg (180 lb)        Temp Readings from Last 3 Encounters:   01/10/19 (!) 96 6 °F (35 9 °C) (Tympanic)   11/30/18 98 °F (36 7 °C)   01/22/18 98 2 °F (36 8 °C) (Oral)        BP Readings from Last 3 Encounters:   01/10/19 144/98   11/30/18 (!) 162/110   01/22/18 160/72         Pulse Readings from Last 3 Encounters:   01/10/19 94   11/30/18 94   01/22/18 70     @LASTSAO2(3)@    Active Problems:   Patient Active Problem List   Diagnosis    Breast cancer ([place] [address] )    Asthma       Past Medical History:   Past Medical History:   Diagnosis Date    Asthma     [facility] cancer ([facility] )     Cancer ([place] [address] )     breast cancer    Hypertension     Psychiatric disorder     depression and anxiety       Surgical History:   Past Surgical History:   Procedure Laterality Date    MASTECTOMY  2014    Right side       Family History:  No family history on file  Cancer-related family history is not on file  Social History:   Social History     Social History    Marital status: Single     Spouse name: N/A    Number of children: N/A    Years of education: N/A     Occupational History    Not on file       Social History Main Topics    Smoking status: Current Every Day Smoker     Packs/day: 0 50     Types: Cigarettes    Smokeless tobacco: Never Used    Alcohol use No    Drug use: No    Sexual activity: Not on file     Other Topics Concern    Not on file     Social History Narrative    No narrative on file       Current Medications:   Current Outpatient Prescriptions   Medication Sig Dispense Refill    albuterol (ACCUNEB) 1 25 MG/3ML nebulizer solution Take 3 mL by nebulization every 6 (six) hours as needed for wheezing 75 mL 0    albuterol (PROVENTIL HFA,VENTOLIN HFA) 90 mcg/act inhaler Inhale 2 puffs every 6 (six) hours as needed for wheezing 1 Inhaler 0    aspirin (ECOTRIN LOW STRENGTH) 81 mg EC tablet Take 81 mg by mouth daily      buPROPion (WELLBUTRIN) 75 mg tablet Take 75 mg by mouth once      busPIRone (BUSPAR) 10 mg tablet Take 10 mg by mouth 2 (two) times a day      cholecalciferol (VITAMIN D3) 1,000 units tablet Take 1,000 Units by mouth daily      hydrochlorothiazide (HYDRODIURIL) 25 mg tablet Take 25 mg by mouth daily      omeprazole (PriLOSEC) 20 mg delayed release capsule Take 20 mg by mouth daily      tamoxifen (NOLVADEX) 20 mg tablet Take 20 mg by mouth 2 (two) times a day       No current facility-administered medications for this visit          Allergies: No Known Allergies

## 2019-02-04 ENCOUNTER — APPOINTMENT (EMERGENCY)
Dept: RADIOLOGY | Facility: HOSPITAL | Age: 54
End: 2019-02-04
Payer: COMMERCIAL

## 2019-02-04 ENCOUNTER — HOSPITAL ENCOUNTER (EMERGENCY)
Facility: HOSPITAL | Age: 54
Discharge: HOME | End: 2019-02-04
Attending: EMERGENCY MEDICINE | Admitting: EMERGENCY MEDICINE
Payer: COMMERCIAL

## 2019-02-04 VITALS
DIASTOLIC BLOOD PRESSURE: 98 MMHG | RESPIRATION RATE: 18 BRPM | OXYGEN SATURATION: 96 % | BODY MASS INDEX: 32.69 KG/M2 | HEART RATE: 74 BPM | WEIGHT: 173 LBS | SYSTOLIC BLOOD PRESSURE: 170 MMHG | TEMPERATURE: 97.8 F

## 2019-02-04 DIAGNOSIS — S62.102A WRIST FRACTURE, LEFT: Primary | ICD-10-CM

## 2019-02-04 PROCEDURE — 73110 X-RAY EXAM OF WRIST: CPT

## 2019-02-04 PROCEDURE — 99283 EMERGENCY DEPT VISIT LOW MDM: CPT

## 2019-02-04 RX ORDER — NAPROXEN 500 MG/1
500 TABLET ORAL 2 TIMES DAILY WITH MEALS
Qty: 10 TABLET | Refills: 0 | Status: SHIPPED | OUTPATIENT
Start: 2019-02-04 | End: 2020-02-04

## 2019-02-04 NOTE — DISCHARGE INSTRUCTIONS
Arm Fracture in Adults   WHAT YOU NEED TO KNOW:   What is an arm fracture? An arm fracture is a crack or break in one or more of the bones in your arm  An arm fracture may be caused by a fall onto an outstretched hand  It may also be caused by trauma from a car accident or a sports injury  Osteoporosis (brittle bones) can increase your risk for a fracture  What are the different types of arm fractures? · Nondisplaced  means the bone cracked or broke but stayed in place  · Displaced  means the 2 ends of the broken bone   · Comminuted  means the bone cracked or broke into several pieces  · Open  means the broken bone went through your skin  What are the signs and symptoms of an arm fracture? · Arm and shoulder pain    · Swelling and bruising    · Abnormal arm position or shape    · Severe pain when you move your arm    · Weakness or numbness in your arm, hand, or fingers  How is an arm fracture diagnosed? Your healthcare provider will ask about your injury and examine you  An x-ray may show the type of fracture you have  You may need more than 1 x-ray, or another x-ray after several days have passed  How is an arm fracture treated? Treatment will depend on what kind of fracture you have, and how bad it is  You may need any of the following:  · A support device , such as a brace, cast, or splint may be needed to hold your broken bones in place  It will decrease your arm movement and allow the bones to heal      · NSAIDs , such as ibuprofen, help decrease swelling and pain  This medicine is available with or without a doctor's order  NSAIDs can cause stomach bleeding or kidney problems in certain people  If you take blood thinner medicine, always ask your healthcare provider if NSAIDs are safe for you  Always read the medicine label and follow directions  · Acetaminophen  decreases pain  It is available without a doctor's order  Ask how much to take and how often to take it   Follow directions  Acetaminophen can cause liver damage if not taken correctly  · Prescription pain medicine  may be given  Ask how to take this medicine safely  · Closed reduction  may be done to put your bones back into the correct position without surgery  · Open reduction surgery  may be needed to put your bones back into the correct position  An incision is made and the bones are put back in the correct position  This may include the use of special wires, pins, plates or screws  How can I manage my symptoms? · Elevate  your arm above the level of your heart as often as you can  This will help decrease swelling and pain  Prop your arm on pillows or blankets to keep it elevated comfortably  · Rest   You should rest your arm as much as possible  Ask your healthcare provider when you can put pressure or weight on your arm  Also ask when you can return to sports or vigorous exercises  · Apply ice  on your arm for 15 to 20 minutes every hour or as directed  Use an ice pack, or put crushed ice in a plastic bag  Cover it with a towel  Ice helps prevent tissue damage and decreases swelling and pain  · Go to physical therapy as directed  A physical therapist teaches you exercises to help improve movement and strength, and to decrease pain  When should I seek immediate care? · The pain in your injured arm does not get better or gets worse, even after you rest and take medicine  · Your injured arm, hand, or fingers feel numb  · Your arm is swollen, red, and feels warm  · Your skin over the arm fracture is swollen, cold, or pale  · You cannot move your arm, hand, or fingers  When should I contact my healthcare provider? · You have a fever  · Your brace or splint becomes wet, damaged, or falls off  · You have questions or concerns about your injury, treatment, or care  CARE AGREEMENT:   You have the right to help plan your care   Learn about your health condition and how it may be treated  Discuss treatment options with your caregivers to decide what care you want to receive  You always have the right to refuse treatment  The above information is an  only  It is not intended as medical advice for individual conditions or treatments  Talk to your doctor, nurse or pharmacist before following any medical regimen to see if it is safe and effective for you  © 2017 [address] [employer] Information is for End User's use only and may not be sold, redistributed or otherwise used for commercial purposes  All illustrations and images included in CareNotes® are the copyrighted property of [employer]  or [employer]  Splint Care   WHAT YOU NEED TO KNOW:   Splint care is important to help protect your splint until it comes off  Some splints are made of fiberglass or plaster that will need to dry and harden  Splint care will help the splint dry and harden correctly  Even after your splint hardens, it can be damaged  DISCHARGE INSTRUCTIONS:   Return to the emergency department if:   · You have increased pain  · Your fingers or toes are numb or tingling  · You feel burning or stinging around your injury  · Your nails, fingers, or toes turn pale, blue, or gray, and feel cold  · You have new or increased trouble moving your fingers or toes  · Your swelling gets worse  · The skin under your splint is bleeding or leaking pus  Contact your healthcare provider if:   · Your hard splint gets wet or is damaged  · You have a fever  · Your splint feels tighter  · You have itchy, dry skin under your splint that is getting worse  · The skin under your splint is red, or you have a new sore  · You notice a bad smell coming from your splint  · You have questions or concerns about your condition or care  How to care for your splint:   · Wait for your hard splint to harden completely    You may have to wait up to 3 days before you can walk on a plaster splint  · Check your splint and the skin around it each day  Check your splint for damage, such as cracks and breaks  Check your skin for redness, increased swelling, and sores  Loosen the elastic bandage around your splint if it feels too tight  · Keep your splint clean and dry  Keep dirt out of your splint  Before you bathe, wrap your hard splint with 2 layers of plastic  Then put a plastic bag over it  Keep the plastic bag tightly sealed  You can also ask your healthcare provider about waterproof shields  Do not put your hard splint in the water , even with a plastic bag over it  A wet splint can make your skin itchy, and may lead to infection  · Do not put powders or deodorants inside your splint  These can dry your skin and increase itching  · Do not try to scratch the skin inside your hard splint with sharp objects  Sharp objects can break off inside your splint or hurt your skin  · Do not pull the padding out of your splint  The padding inside your splint protects your skin  You may develop a sore on your skin if you take out the padding  Follow up with your healthcare provider as directed within 1 to 2 weeks:  Write down your questions so you remember to ask them during your visits  © Copyright [employer] 2018 Information is for End User's use only and may not be sold, redistributed or otherwise used for commercial purposes  All illustrations and images included in CareNotes® are the copyrighted property of [employer]  or [facility]   The above information is an  only  It is not intended as medical advice for individual conditions or treatments  Talk to your doctor, nurse or pharmacist before following any medical regimen to see if it is safe and effective for you

## 2019-02-04 NOTE — ED PROVIDER NOTES
History  Chief Complaint   Patient presents with   Farzana Beebe     pt reports she fell yesterday on her right knee and also injured her left wrist, denies hitting head, states she feels nauseated today, no thinners     This is a [age]year-old female patient who states she slipped on ice yesterday landing mostly on her left wrist and has pain and swelling to the area  She told triage that she room on her right knee but does not have any pain or trauma  She walks without a limp  She states her left wrist hurts when she moves that there is no numbness or tingling or weakness  She has taken nothing for the pain  She did not strike her head she is not on blood thinner she denies any neck or back pain  Just her left wrist is painful and swollen  She will have an x-ray to rule out a fracture  Prior to Admission Medications   Prescriptions Last Dose Informant Patient Reported? Taking?    albuterol (ACCUNEB) 1 25 MG/3ML nebulizer solution   No No   Sig: Take 3 mL by nebulization every 6 (six) hours as needed for wheezing   albuterol (PROVENTIL HFA,VENTOLIN HFA) 90 mcg/act inhaler   No No   Sig: Inhale 2 puffs every 6 (six) hours as needed for wheezing   aspirin (ECOTRIN LOW STRENGTH) 81 mg EC tablet   Yes No   Sig: Take 81 mg by mouth daily   buPROPion (WELLBUTRIN) 75 mg tablet   Yes No   Sig: Take 75 mg by mouth once   busPIRone (BUSPAR) 10 mg tablet   Yes No   Sig: Take 10 mg by mouth 2 (two) times a day   cholecalciferol (VITAMIN D3) 1,000 units tablet   Yes No   Sig: Take 1,000 Units by mouth daily   hydrochlorothiazide (HYDRODIURIL) 25 mg tablet  Self Yes No   Sig: Take 25 mg by mouth daily   omeprazole (PriLOSEC) 20 mg delayed release capsule   Yes No   Sig: Take 20 mg by mouth daily   tamoxifen (NOLVADEX) 20 mg tablet  Self Yes No   Sig: Take 20 mg by mouth 2 (two) times a day      Facility-Administered Medications: None       Past Medical History:   Diagnosis Date    Asthma     [facility]     [facility])     breast cancer    Hypertension     Psychiatric disorder     depression and anxiety       Past Surgical History:   Procedure Laterality Date    MASTECTOMY  2014    Right side       History reviewed  No pertinent family history  I have reviewed and agree with the history as documented  Social History   Substance Use Topics    Smoking status: Current Every Day Smoker     Packs/day: 0 50     Types: Cigarettes    Smokeless tobacco: Never Used    Alcohol use No        Review of Systems   All other systems reviewed and are negative  Physical Exam  Physical Exam   Constitutional: She appears well-developed and well-nourished  HENT:   Head: Normocephalic and atraumatic  Right Ear: External ear normal    Left Ear: External ear normal    Nose: Nose normal    Mouth/Throat: Oropharynx is clear and moist    Eyes: Pupils are equal, round, and reactive to light  Conjunctivae are normal    Neck: Normal range of motion  Neck supple  Cardiovascular: Normal rate and regular rhythm  Pulmonary/Chest: Effort normal and breath sounds normal    Abdominal: Soft  Bowel sounds are normal  There is no tenderness  Musculoskeletal: Normal range of motion  Hands:  Neurological: She is alert  Skin: Skin is warm  Psychiatric: She has a normal mood and affect  Her behavior is normal    Nursing note and vitals reviewed        Vital Signs  ED Triage Vitals [02/04/19 1629]   Temperature Pulse Respirations Blood Pressure SpO2   97 8 °F (36 6 °C) 74 18 170/98 96 %      Temp Source Heart Rate Source Patient Position - Orthostatic VS BP Location FiO2 (%)   Tympanic Monitor Sitting Left arm --      Pain Score       8           Vitals:    02/04/19 1629   BP: 170/98   Pulse: 74   Patient Position - Orthostatic VS: Sitting       Visual Acuity      ED Medications  Medications - No data to display    Diagnostic Studies  Results Reviewed     None                 XR wrist 3+ views LEFT   ED Interpretation by Willy Riddle PA-C (02/04 1702)   Questionable fracture over trapezium patient is tender over this area                 Procedures  Procedures       Phone Contacts  ED Phone Contact    ED Course                               MDM    Disposition  Final diagnoses:   Wrist fracture, left     Time reflects when diagnosis was documented in both MDM as applicable and the Disposition within this note     Time User Action Codes Description Comment    2/4/2019  5:04 PM [place], [address] Armaan Chacko Wrist fracture, left       ED Disposition     ED Disposition Condition Date/Time Comment    Discharge  Mon Feb 4, 2019  5:04 PM Vanessa Holliday discharge to home/self care  Condition at discharge: Good        Follow-up Information     Follow up With Specialties Details Why Contact Info Additional [id number] [facility] Orthopedic Surgery Schedule an appointment as soon as possible for a visit  Sherri 10 [address] [facility], [phone]          Patient's Medications   Discharge Prescriptions    NAPROXEN (EC NAPROSYN) 500 MG EC TABLET    Take 1 tablet (500 mg total) by mouth 2 (two) times a day with meals       Start Date: 2/4/2019  End Date: 2/4/2020       Order Dose: 500 mg       Quantity: 10 tablet    Refills: 0     No discharge procedures on file      ED Provider  Electronically Signed by           Willy Riddle PA-C  02/04/19 7485

## 2019-02-18 ENCOUNTER — HOSPITAL ENCOUNTER (OUTPATIENT)
Dept: MAMMOGRAPHY | Facility: CLINIC | Age: 54
Discharge: HOME | End: 2019-02-18
Payer: COMMERCIAL

## 2019-02-18 VITALS — WEIGHT: 175 LBS | HEIGHT: 62 IN | BODY MASS INDEX: 32.2 KG/M2

## 2019-02-18 DIAGNOSIS — Z17.0 MALIGNANT NEOPLASM OF RIGHT BREAST IN FEMALE, ESTROGEN RECEPTOR POSITIVE, UNSPECIFIED SITE OF BREAST (HCC): ICD-10-CM

## 2019-02-18 DIAGNOSIS — C50.911 MALIGNANT NEOPLASM OF RIGHT BREAST IN FEMALE, ESTROGEN RECEPTOR POSITIVE, UNSPECIFIED SITE OF BREAST (HCC): ICD-10-CM

## 2019-02-18 PROCEDURE — 77063 BREAST TOMOSYNTHESIS BI: CPT

## 2019-02-18 PROCEDURE — 77067 SCR MAMMO BI INCL CAD: CPT

## 2019-08-12 ENCOUNTER — OFFICE VISIT (OUTPATIENT)
Dept: HEMATOLOGY ONCOLOGY | Facility: CLINIC | Age: 54
End: 2019-08-12
Payer: COMMERCIAL

## 2019-08-12 VITALS
DIASTOLIC BLOOD PRESSURE: 78 MMHG | RESPIRATION RATE: 18 BRPM | HEIGHT: 62 IN | TEMPERATURE: 98.5 F | SYSTOLIC BLOOD PRESSURE: 124 MMHG | HEART RATE: 81 BPM | BODY MASS INDEX: 32.2 KG/M2 | OXYGEN SATURATION: 97 % | WEIGHT: 175 LBS

## 2019-08-12 DIAGNOSIS — Z17.0 MALIGNANT NEOPLASM OF RIGHT BREAST IN FEMALE, ESTROGEN RECEPTOR POSITIVE, UNSPECIFIED SITE OF BREAST (HCC): Primary | ICD-10-CM

## 2019-08-12 DIAGNOSIS — C50.911 MALIGNANT NEOPLASM OF RIGHT BREAST IN FEMALE, ESTROGEN RECEPTOR POSITIVE, UNSPECIFIED SITE OF BREAST (HCC): Primary | ICD-10-CM

## 2019-08-12 PROCEDURE — 99214 OFFICE O/P EST MOD 30 MIN: CPT | Performed by: INTERNAL MEDICINE

## 2019-08-12 RX ORDER — ANASTROZOLE 1 MG/1
1 TABLET ORAL DAILY
Qty: 90 TABLET | Refills: 3 | Status: SHIPPED | OUTPATIENT
Start: 2019-08-12 | End: 2020-07-31

## 2019-08-12 NOTE — PROGRESS NOTES
Hematology / Oncology Outpatient Follow Up Note    Judit Pedroza 52 y o  female :1965 [id number]         Date:  2019    Assessment / Plan:  A 52year old postmenopausal woman with stage IIB right breast cancer, grade 3, ER/VT positive, HER-2 negative disease  She had one positive lymph node  She underwent mastectomy with axillary lymph node dissection, without reconstruction resulting in the EDIS  She has very high Oncotype DX recurrence score was 61  She completed adjuvant chemotherapy with AC followed by paclitaxel in 2014  She started adjuvant hormonal therapy with tamoxifen in 2014, since she was premenopausal at the time of breast cancer diagnosis  She has no menstrual cycles for 5 years since her adjuvant chemotherapy  This is most likely the postmenopausal condition which I will confirm by obtaining [address] and estradiol  We discussed further hormonal therapy  I recommended her to switch her hormonal therapy to anastrozole 1 mg once a day for 5 more years  Side effects of anastrozole was thoroughly discussed, including but not limited to hot flashes, musculoskeletal symptoms and bone mineral density loss  I recommended her to take calcium vitamin-D on a regular basis  She is in agreement with my recommendation  I will see her again in a year for routine follow-up           Subjective:      HPI:  A 46year old perimenopausal woman, who was found to have matted right breast  She underwent ultrasound-guided biopsy in 2013 which showed invasive ductal carcinoma  She was referred to Dr Yadira Gil  She underwent mastectomy without reconstruction as well as right axillary lymph node dissection in 2013  Mastectomy specimen showed 3 8 cm invasive ductal carcinoma with lymphovascular invasion  this was grade 3  One out of 51, axillary lymph node positive for metastatic disease with 1 cm of tumor deposit   Previously, she underwent CT scan of chest, abdomen, pelvis, which showed no evidence of metastatic disease  She presents today to discuss adjuvant treatment options  She has several comorbidities including bipolar disorder, depression, and anxiety  She has a history of stroke 3 times  However, she does not appear to have any residual neurological deficit  She has no complaint of pain  She is to have mild limitation of range of motion of right shoulder after the surgery  she has no weight lossor any respiratory symptoms  Her performance status is normal  Her last menstrual cycle was 2 months ago  She has some hot flashes and some sweating             Interval History:  A 52year-old postmenopausal woman with stage IIB right breast cancer  She had approximately 3 8 cm invasive ductal carcinoma, grade 3  This was ER/AZ positive, HER-2 negative disease  She had 1out of 51 positive axillary lymph node with tumor deposit 1 cm  she underwent mastectomy with axillary lymph node dissection resulting in EDIS, followed by adjuvant chemotherapy  Her Oncotype DX score was 61, which is high risk  She was treated with adjuvant chemotherapy with dose dense AC followed by paclitaxel, which was completed in June 2014  She had regular menstrual cycles until February 2014  She started hormonal treatment with tamoxifen in August 2014, since she was premenopausal   She has no menstrual cycle since the adjuvant chemotherapy  She has no toxicity from tamoxifen  She denied hot flashes  She has no respiratory symptoms  She denied any pain  Her last mammography in February 2019 was benign  Her performance status is normal         Objective:      Primary Diagnosis:     Right breast cancer  Stage IIB(pT2, pN1a, M0)  Grade 3  ER/AZ positive, HER-2 negative disease  Oncotype DX recurrence score 61  Diagnosed in December 2013       Cancer Staging:  Cancer Staging  No matching staging information was found for the patient         Previous Hematologic/ Oncologic Treatment:      1   Right mastectomy and axillary lymph node dissection  2  adjuvant chemotherapy with dose dense a c  X4 followed by weekly paclitaxel x12  Completed in June 2014  3  Adjuvant hormonal therapy with tamoxifen from August 2014 through August 2019       Current Hematologic/ Oncologic Treatment:       Adjuvant hormonal therapy with anastrozole to be started in August 2019       Disease Status:      EDIS status post mastectomy with axillary lymph node dissection       Test Results:     Pathology:     3 8 cm invasive ductal carcinoma with lymphovascular invasion  Grade 3  One out of 51, axillary lymph node positive for metastatic disease with 1 cm of tumor deposit  ER/TX positive, HER-2 negative disease  Stage IIB (pT2, pN1a, M0)  Oncotype DX recurrence score, 61       Radiology:     Mammography in February 2019 was benign  BI-RADS 2       Laboratory:           Physical Exam:        General Appearance:    Alert, oriented          Eyes:    PERRL   Ears:    Normal external ear canals, both ears   Nose:   Nares normal, septum midline   Throat:   Mucosa moist  Pharynx without injection  Neck:   Supple         Lungs:     Clear to auscultation bilaterally   Chest Wall:    No tenderness or deformity    Heart:    Regular rate and rhythm         Abdomen:     Soft, non-tender, bowel sounds +, no organomegaly               Extremities:   Extremities no cyanosis or edema         Skin:   no rash or icterus  Lymph nodes:   Cervical, supraclavicular, and axillary nodes normal   Neurologic:   CNII-XII intact, normal strength, sensation and reflexes     Throughout             Breast exam:   status post right mastectomy without reconstruction  No palpable abnormalities in the right chest wall  Left breast exam is negative  ROS: Review of Systems   All other systems reviewed and are negative  Imaging: No results found        Labs:   Lab Results   Component Value Date    WBC 9 26 10/01/2018    HGB 11 7 10/01/2018    HCT 36 4 10/01/2018 MCV 92 10/01/2018     10/01/2018     Lab Results   Component Value Date     07/30/2014    K 4 0 10/01/2018     10/01/2018    CO2 28 10/01/2018    ANIONGAP 8 07/30/2014    BUN 20 10/01/2018    CREATININE 0 96 10/01/2018    GLUCOSE 102 07/30/2014    GLUF 78 10/01/2018    GLUF 78 10/01/2018    CALCIUM 8 9 10/01/2018    AST 81 (H) 10/01/2018    ALT 85 (H) 10/01/2018    ALKPHOS 109 10/01/2018    PROT 6 7 07/30/2014    BILITOT 0 18 (L) 07/30/2014    EGFR 67 10/01/2018         Lab Results   Component Value Date    IRON 62 10/01/2018    TIBC 405 10/01/2018    FERRITIN 111 10/01/2018       Lab Results   Component Value Date    LFDJQLHA83 489 10/01/2018       Lab Results   Component Value Date    FOLATE >20 0 (H) 10/01/2018         Current Medications: Reviewed  Allergies: Reviewed  PMH/FH/SH:  Reviewed      Vital Sign:    Body surface area is 1 81 meters squared      Wt Readings from Last 3 Encounters:   08/12/19 79 4 kg (175 lb)   02/18/19 79 4 kg (175 lb)   02/04/19 78 5 kg (173 lb)        Temp Readings from Last 3 Encounters:   08/12/19 98 5 °F (36 9 °C) (Tympanic Core)   02/04/19 97 8 °F (36 6 °C) (Tympanic)   01/10/19 (!) 96 6 °F (35 9 °C) (Tympanic)        BP Readings from Last 3 Encounters:   08/12/19 124/78   02/04/19 170/98   01/10/19 144/98         Pulse Readings from Last 3 Encounters:   08/12/19 81   02/04/19 74   01/10/19 94     @LASTSAO2(3)@

## 2019-09-06 ENCOUNTER — HOSPITAL ENCOUNTER (EMERGENCY)
Facility: HOSPITAL | Age: 54
Discharge: HOME | End: 2019-09-06
Attending: EMERGENCY MEDICINE | Admitting: EMERGENCY MEDICINE
Payer: COMMERCIAL

## 2019-09-06 VITALS
TEMPERATURE: 98.5 F | DIASTOLIC BLOOD PRESSURE: 63 MMHG | RESPIRATION RATE: 18 BRPM | OXYGEN SATURATION: 99 % | BODY MASS INDEX: 32.02 KG/M2 | SYSTOLIC BLOOD PRESSURE: 141 MMHG | WEIGHT: 175.04 LBS | HEART RATE: 80 BPM

## 2019-09-06 DIAGNOSIS — K64.9 HEMORRHOIDS: ICD-10-CM

## 2019-09-06 DIAGNOSIS — G56.02 CARPAL TUNNEL SYNDROME OF LEFT WRIST: Primary | ICD-10-CM

## 2019-09-06 PROCEDURE — 99284 EMERGENCY DEPT VISIT MOD MDM: CPT | Performed by: EMERGENCY MEDICINE

## 2019-09-06 PROCEDURE — 99284 EMERGENCY DEPT VISIT MOD MDM: CPT

## 2019-09-06 RX ORDER — IBUPROFEN 600 MG/1
600 TABLET ORAL ONCE
Status: COMPLETED | OUTPATIENT
Start: 2019-09-06 | End: 2019-09-06

## 2019-09-06 RX ADMIN — IBUPROFEN 600 MG: 600 TABLET ORAL at 12:55

## 2019-09-06 NOTE — ED PROVIDER NOTES
History  Chief Complaint   Patient presents with    Numbness     Pt has c/o L arm numbness since last week that has become worse     22-year-old female presenting for evaluation of left hand numbness and tingling  Patient states this has been ongoing for 1 week she denies any trauma to her neck arm shoulder  States that is all of the digits of her hand she also says she has decreased  strength and pain occasionally radiates up her left hand into her forearm  She has numbness and tingling at rest which is worse with bending of her wrist in any direction  She has had carpal tunnel in the past she says this feels similar but is worse than that  She has tried Tylenol without relief of her symptoms no ibuprofen has been used has not used any splinting or ice  No other symptoms  History provided by:  Patient   used: No    Wrist Pain   Severity:  Mild  Onset quality:  Gradual  Timing:  Constant  Progression:  Unchanged  Chronicity:  New  Associated symptoms: no abdominal pain, no chest pain, no diarrhea, no fever, no myalgias, no nausea, no shortness of breath, no sore throat, no vomiting and no wheezing        Prior to Admission Medications   Prescriptions Last Dose Informant Patient Reported? Taking?    albuterol (ACCUNEB) 1 25 MG/3ML nebulizer solution   No No   Sig: Take 3 mL by nebulization every 6 (six) hours as needed for wheezing   albuterol (PROVENTIL HFA,VENTOLIN HFA) 90 mcg/act inhaler   No No   Sig: Inhale 2 puffs every 6 (six) hours as needed for wheezing   anastrozole (ARIMIDEX) 1 mg tablet   No No   Sig: Take 1 tablet (1 mg total) by mouth daily   aspirin (ECOTRIN LOW STRENGTH) 81 mg EC tablet   Yes No   Sig: Take 81 mg by mouth daily   buPROPion (WELLBUTRIN) 75 mg tablet   Yes No   Sig: Take 75 mg by mouth once   busPIRone (BUSPAR) 10 mg tablet   Yes No   Sig: Take 10 mg by mouth 2 (two) times a day   cholecalciferol (VITAMIN D3) 1,000 units tablet   Yes No   Sig: Take 1,000 Units by mouth daily   hydrochlorothiazide (HYDRODIURIL) 25 mg tablet  Self Yes No   Sig: Take 25 mg by mouth daily   naproxen (EC NAPROSYN) 500 MG EC tablet   No No   Sig: Take 1 tablet (500 mg total) by mouth 2 (two) times a day with meals   omeprazole (PriLOSEC) 20 mg delayed release capsule   Yes No   Sig: Take 20 mg by mouth daily      Facility-Administered Medications: None       Past Medical History:   Diagnosis Date    Asthma     [facility] ([facility] )     Cancer ([place] [address] )     breast cancer    Hypertension     Psychiatric disorder     depression and anxiety       Past Surgical History:   Procedure Laterality Date    MASTECTOMY  2014    Right side       History reviewed  No pertinent family history  I have reviewed and agree with the history as documented  Social History     Tobacco Use    Smoking status: Current Every Day Smoker     Packs/day: 0 50     Types: Cigarettes    Smokeless tobacco: Never Used   Substance Use Topics    Alcohol use: No    Drug use: No        Review of Systems   Constitutional: Negative for chills and fever  HENT: Negative for sore throat  Eyes: Negative for visual disturbance  Respiratory: Negative for chest tightness, shortness of breath and wheezing  Cardiovascular: Negative for chest pain  Gastrointestinal: Negative for abdominal pain, constipation, diarrhea, nausea and vomiting  Genitourinary: Negative for dysuria and hematuria  Musculoskeletal: Positive for arthralgias  Negative for myalgias  Skin: Negative for color change  Neurological: Positive for numbness  Negative for light-headedness  Hematological: Negative for adenopathy  Psychiatric/Behavioral: Negative for agitation and behavioral problems  All other systems reviewed and are negative        Physical Exam  ED Triage Vitals [09/06/19 1149]   Temperature Pulse Respirations Blood Pressure SpO2   98 5 °F (36 9 °C) 86 18 167/77 98 %      Temp Source Heart Rate Source Patient Position - Orthostatic VS BP Location FiO2 (%)   Oral Monitor Sitting Left arm --      Pain Score       No Pain             Orthostatic Vital Signs  Vitals:    09/06/19 1149 09/06/19 1230   BP: 167/77 141/63   Pulse: 86 80   Patient Position - Orthostatic VS: Sitting Sitting       Physical Exam   Constitutional: She is oriented to person, place, and time  She appears well-developed and well-nourished  No distress  HENT:   Head: Normocephalic and atraumatic  Eyes: Conjunctivae and EOM are normal  No scleral icterus  Neck: Normal range of motion  Neck supple  Cardiovascular: Normal rate, regular rhythm and normal heart sounds  No murmur heard  Pulmonary/Chest: Effort normal and breath sounds normal  No respiratory distress  Abdominal: Soft  Bowel sounds are normal  There is no tenderness  Musculoskeletal: Normal range of motion  She exhibits tenderness  Neurological: She is alert and oriented to person, place, and time  4/5 strength in left hand    Skin: Skin is warm and dry  Psychiatric: She has a normal mood and affect  Her behavior is normal    Nursing note and vitals reviewed  ED Medications  Medications   ibuprofen (MOTRIN) tablet 600 mg (600 mg Oral Given 9/6/19 1255)       Diagnostic Studies  Results Reviewed     None                 No orders to display         Procedures  Procedures        ED Course                               MDM  Number of Diagnoses or Management Options  Carpal tunnel syndrome of left wrist: new and requires workup  Hemorrhoids: established and worsening  Diagnosis management comments: 49-year-old female presenting with left wrist numbness tingling and pain, most likely carpal tunnel based on exam patient has full range of motion however pain at extremes, will write for a cock-up splint as well as ibuprofen and follow up with Orthopedics  Will also write for 2% hydrocortisone for hemorrhoids as patient states she is out of this medication         Amount and/or Complexity of Data Reviewed  Review and summarize past medical records: yes        Disposition  Final diagnoses:   Carpal tunnel syndrome of left wrist   Hemorrhoids     Time reflects when diagnosis was documented in both MDM as applicable and the Disposition within this note     Time User Action Codes Description Comment    9/6/2019 12:38 PM Yudelka Barton Add [G56 02] Carpal tunnel syndrome of left wrist     9/6/2019  1:36 PM Yudelka Barton Add [K64 9] Hemorrhoids       ED Disposition     ED Disposition Condition Date/Time Comment    Discharge Stable Fri Sep 6, 2019 12:38 PM Virgle Picking discharge to home/self care              Follow-up Information     Follow up With Specialties Details Why Contact Info Additional 14[address] [facility] Orthopedic Surgery Schedule an appointment as soon as possible for a visit  If symptoms worsen Sherri 10 [phone]  [phone] 30 [facility], 600 [facility] 20, [place], [address] [place] [address], [facility] Emergency Department Emergency Medicine  As needed [address] [place] [address], 600 [facility] 20, [place], [facility]7 [facility], [address]          Discharge Medication List as of 9/6/2019  1:31 PM      CONTINUE these medications which have NOT CHANGED    Details   albuterol (ACCUNEB) 1 25 MG/3ML nebulizer solution Take 3 mL by nebulization every 6 (six) hours as needed for wheezing, Starting Mon 1/22/2018, Print      albuterol (PROVENTIL HFA,VENTOLIN HFA) 90 mcg/act inhaler Inhale 2 puffs every 6 (six) hours as needed for wheezing, Starting Mon 1/22/2018, Print      anastrozole (ARIMIDEX) 1 mg tablet Take 1 tablet (1 mg total) by mouth daily, Starting Mon 8/12/2019, Normal      aspirin (ECOTRIN LOW STRENGTH) 81 mg EC tablet Take 81 mg by mouth daily, Historical Med      buPROPion (WELLBUTRIN) 75 mg tablet Take 75 mg by mouth once, Historical Med      busPIRone (BUSPAR) 10 mg tablet Take 10 mg by mouth 2 (two) times a day, Historical Med      cholecalciferol (VITAMIN D3) 1,000 units tablet Take 1,000 Units by mouth daily, Historical Med      hydrochlorothiazide (HYDRODIURIL) 25 mg tablet Take 25 mg by mouth daily, Historical Med      naproxen (EC NAPROSYN) 500 MG EC tablet Take 1 tablet (500 mg total) by mouth 2 (two) times a day with meals, Starting Mon 2/4/2019, Until Tue 2/4/2020, Print      omeprazole (PriLOSEC) 20 mg delayed release capsule Take 20 mg by mouth daily, Historical Med           Outpatient Discharge Orders   Cock Up Wrist Splint       ED Provider  Attending physically available and evaluated Johndolly Ashok PHILIP managed the patient along with the ED Attending      Electronically Signed by         Getachew Palafox MD  09/06/19 7849

## 2019-09-06 NOTE — ED ATTENDING ATTESTATION
Shola Mohan MD, saw and evaluated the patient  I have discussed the patient with the resident/non-physician practitioner and agree with the resident's/non-physician practitioner's findings, Plan of Care, and MDM as documented in the resident's/non-physician practitioner's note, except where noted  All available labs and Radiology studies were reviewed  I was present for key portions of any procedure(s) performed by the resident/non-physician practitioner and I was immediately available to provide assistance  At this point I agree with the current assessment done in the Emergency Department  I have conducted an independent evaluation of this patient a history and physical is as follows:    Left hand tingling which patient states is worse in the morning  Does not have any motor symptoms  Does not have headache, neck pain, or upper muscle distribution symptoms  Additionally, patient complains of hemorrhoids and is asking for refill of her hemorrhoid cream   Patient has not had significant rectal bleeding  She has not had vomiting  She does not have significant rectal pain  She would like a referral to someone for colonoscopy  Patient denies abdominal pain, chest pain, shortness of breath  Review of systems negative in 12 systems reviewed  On exam patient has positive Tinel's and Phalen sign  She has nonthrombosed external hemorrhoids with no active bleeding  The remainder of her exam is normal   Impression:  Hemorrhoids, carpal tunnel    Will plan to treat with cock-up splint, rectal steroid cream  Critical Care Time  Procedures

## 2019-10-28 ENCOUNTER — TELEPHONE (OUTPATIENT)
Dept: SURGICAL ONCOLOGY | Facility: CLINIC | Age: 54
End: 2019-10-28

## 2020-02-03 ENCOUNTER — APPOINTMENT (EMERGENCY)
Dept: RADIOLOGY | Facility: HOSPITAL | Age: 55
End: 2020-02-03
Payer: COMMERCIAL

## 2020-02-03 ENCOUNTER — HOSPITAL ENCOUNTER (EMERGENCY)
Facility: HOSPITAL | Age: 55
Discharge: HOME | End: 2020-02-03
Attending: EMERGENCY MEDICINE | Admitting: EMERGENCY MEDICINE
Payer: COMMERCIAL

## 2020-02-03 VITALS
WEIGHT: 181 LBS | TEMPERATURE: 97.3 F | BODY MASS INDEX: 33.1 KG/M2 | DIASTOLIC BLOOD PRESSURE: 61 MMHG | OXYGEN SATURATION: 97 % | HEART RATE: 81 BPM | RESPIRATION RATE: 18 BRPM | SYSTOLIC BLOOD PRESSURE: 134 MMHG

## 2020-02-03 DIAGNOSIS — R06.02 SOB (SHORTNESS OF BREATH): ICD-10-CM

## 2020-02-03 DIAGNOSIS — J45.901 ASTHMA EXACERBATION: Primary | ICD-10-CM

## 2020-02-03 DIAGNOSIS — R05.9 COUGH: ICD-10-CM

## 2020-02-03 LAB
ATRIAL RATE: 73 BPM
P AXIS: 70 DEGREES
PR INTERVAL: 152 MS
QRS AXIS: 68 DEGREES
QRSD INTERVAL: 88 MS
QT INTERVAL: 422 MS
QTC INTERVAL: 464 MS
T WAVE AXIS: 63 DEGREES
VENTRICULAR RATE: 73 BPM

## 2020-02-03 PROCEDURE — 93005 ELECTROCARDIOGRAM TRACING: CPT

## 2020-02-03 PROCEDURE — 93010 ELECTROCARDIOGRAM REPORT: CPT | Performed by: INTERNAL MEDICINE

## 2020-02-03 PROCEDURE — 71046 X-RAY EXAM CHEST 2 VIEWS: CPT

## 2020-02-03 PROCEDURE — 99285 EMERGENCY DEPT VISIT HI MDM: CPT

## 2020-02-03 PROCEDURE — 94640 AIRWAY INHALATION TREATMENT: CPT

## 2020-02-03 PROCEDURE — 99284 EMERGENCY DEPT VISIT MOD MDM: CPT | Performed by: EMERGENCY MEDICINE

## 2020-02-03 PROCEDURE — 94760 N-INVAS EAR/PLS OXIMETRY 1: CPT

## 2020-02-03 RX ORDER — PREDNISONE 20 MG/1
60 TABLET ORAL ONCE
Status: COMPLETED | OUTPATIENT
Start: 2020-02-03 | End: 2020-02-03

## 2020-02-03 RX ORDER — PREDNISONE 20 MG/1
60 TABLET ORAL DAILY
Qty: 12 TABLET | Refills: 0 | Status: SHIPPED | OUTPATIENT
Start: 2020-02-03 | End: 2020-02-07

## 2020-02-03 RX ORDER — SODIUM CHLORIDE FOR INHALATION 0.9 %
3 VIAL, NEBULIZER (ML) INHALATION ONCE
Status: COMPLETED | OUTPATIENT
Start: 2020-02-03 | End: 2020-02-03

## 2020-02-03 RX ORDER — BUDESONIDE AND FORMOTEROL FUMARATE DIHYDRATE 160; 4.5 UG/1; UG/1
2 AEROSOL RESPIRATORY (INHALATION) 2 TIMES DAILY
COMMUNITY

## 2020-02-03 RX ORDER — ONDANSETRON 4 MG/1
4 TABLET, ORALLY DISINTEGRATING ORAL ONCE
Status: COMPLETED | OUTPATIENT
Start: 2020-02-03 | End: 2020-02-03

## 2020-02-03 RX ORDER — ALBUTEROL SULFATE 2.5 MG/3ML
2.5 SOLUTION RESPIRATORY (INHALATION) EVERY 6 HOURS PRN
Qty: 75 ML | Refills: 0 | Status: SHIPPED | OUTPATIENT
Start: 2020-02-03

## 2020-02-03 RX ADMIN — IPRATROPIUM BROMIDE 0.5 MG: 0.5 SOLUTION RESPIRATORY (INHALATION) at 14:50

## 2020-02-03 RX ADMIN — PREDNISONE 60 MG: 20 TABLET ORAL at 14:59

## 2020-02-03 RX ADMIN — ISODIUM CHLORIDE 3 ML: 0.03 SOLUTION RESPIRATORY (INHALATION) at 14:50

## 2020-02-03 RX ADMIN — ONDANSETRON 4 MG: 4 TABLET, ORALLY DISINTEGRATING ORAL at 15:00

## 2020-02-03 RX ADMIN — ALBUTEROL SULFATE 10 MG: 2.5 SOLUTION RESPIRATORY (INHALATION) at 14:50

## 2020-02-03 NOTE — ED PROVIDER NOTES
History  Chief Complaint   Patient presents with    Shortness of Breath     Pt reports sob with exertion and headache starting 3 days ago  Pt denies cp      27-year-old female history of asthma, hypertension, remote history of breast cancer coming in today with 3 days of worsening shortness of breath, audible wheezing, cough productive of greenish-yellow sputum  Patient states that she believes she started getting a cold a couple days ago when her symptoms began  She has had worsening wheezing and cough over the last 2 days and finally had enough and decided come in  She states she has also had intermittent generalized headache over the last couple days as well  She is not on oxygen at home  She does try to use her albuterol inhaler without improvement  She also has Symbicort at home which she does not like to use and has not been using  She denies any symptoms  She denies any lightheadedness, vision changes, fever/chills, chest pain, nausea/vomiting, abdominal pain, or any bladder or bowel symptoms  History provided by:  Patient   used: No    Shortness of Breath   Severity:  Moderate  Onset quality:  Gradual  Duration:  3 days  Timing:  Constant  Progression:  Worsening  Chronicity:  New  Context: URI    Context: not activity, not animal exposure, not emotional upset, not fumes, not known allergens, not occupational exposure, not pollens, not smoke exposure, not strong odors and not weather changes    Relieved by:  Nothing  Worsened by:  Exertion  Ineffective treatments:  Inhaler  Associated symptoms: cough, headaches, sputum production and wheezing    Associated symptoms: no abdominal pain, no chest pain, no claudication, no diaphoresis, no ear pain, no fever, no hemoptysis, no neck pain, no PND, no rash, no sore throat, no syncope, no swollen glands and no vomiting        Prior to Admission Medications   Prescriptions Last Dose Informant Patient Reported? Taking?    albuterol (ACCUNEB) 1 25 MG/3ML nebulizer solution Not Taking at Unknown time  No No   Sig: Take 3 mL by nebulization every 6 (six) hours as needed for wheezing   Patient not taking: Reported on 2/3/2020   albuterol (PROVENTIL HFA,VENTOLIN HFA) 90 mcg/act inhaler 2/3/2020 at Unknown time  No Yes   Sig: Inhale 2 puffs every 6 (six) hours as needed for wheezing   anastrozole (ARIMIDEX) 1 mg tablet 2/2/2020 at Unknown time  No Yes   Sig: Take 1 tablet (1 mg total) by mouth daily   aspirin (ECOTRIN LOW STRENGTH) 81 mg EC tablet 2/2/2020 at Unknown time  Yes Yes   Sig: Take 81 mg by mouth daily   buPROPion (WELLBUTRIN) 75 mg tablet 2/2/2020 at Unknown time  Yes Yes   Sig: Take 75 mg by mouth once   budesonide-formoterol (SYMBICORT) 160-4 5 mcg/act inhaler 2/3/2020 at Unknown time  Yes Yes   Sig: Inhale 2 puffs 2 (two) times a day Rinse mouth after use     busPIRone (BUSPAR) 10 mg tablet 2/2/2020 at Unknown time  Yes Yes   Sig: Take 10 mg by mouth 2 (two) times a day   cholecalciferol (VITAMIN D3) 1,000 units tablet 2/2/2020 at Unknown time  Yes Yes   Sig: Take 1,000 Units by mouth daily   hydrochlorothiazide (HYDRODIURIL) 25 mg tablet 2/2/2020 at Unknown time Self Yes Yes   Sig: Take 25 mg by mouth daily   hydrocortisone (ANUSOL-HC) 2 5 % rectal cream Not Taking at Unknown time  No No   Sig: Apply rectally 2 times daily   Patient not taking: Reported on 2/3/2020   naproxen (EC NAPROSYN) 500 MG EC tablet   No No   Sig: Take 1 tablet (500 mg total) by mouth 2 (two) times a day with meals   omeprazole (PriLOSEC) 20 mg delayed release capsule 2/2/2020 at Unknown time  Yes Yes   Sig: Take 20 mg by mouth daily      Facility-Administered Medications: None       Past Medical History:   Diagnosis Date    Asthma     [facility] cancer ([facility] )     Cancer ([facility] )     breast cancer    Hypertension     Psychiatric disorder     depression and anxiety       Past Surgical History:   Procedure Laterality Date    MASTECTOMY  2014    Right side History reviewed  No pertinent family history  I have reviewed and agree with the history as documented  Social History     Tobacco Use    Smoking status: Former Smoker     Packs/day: 0 00     Types: Cigarettes     Last attempt to quit: 2/1/2020    Smokeless tobacco: Never Used   Substance Use Topics    Alcohol use: No    Drug use: No        Review of Systems   Constitutional: Negative for appetite change, chills, diaphoresis, fever and unexpected weight change  HENT: Negative for congestion, ear pain, rhinorrhea and sore throat  Eyes: Negative for photophobia and visual disturbance  Respiratory: Positive for cough, sputum production, shortness of breath and wheezing  Negative for hemoptysis and chest tightness  Cardiovascular: Negative for chest pain, palpitations, claudication, leg swelling, syncope and PND  Gastrointestinal: Negative for abdominal distention, abdominal pain, blood in stool, constipation, diarrhea, nausea and vomiting  Genitourinary: Negative for dysuria and hematuria  Musculoskeletal: Negative for back pain, joint swelling, neck pain and neck stiffness  Skin: Negative for color change, pallor, rash and wound  Neurological: Positive for headaches  Negative for dizziness, syncope, weakness and light-headedness  Psychiatric/Behavioral: Negative for agitation  All other systems reviewed and are negative  Physical Exam  ED Triage Vitals [02/03/20 1317]   Temperature Pulse Respirations Blood Pressure SpO2   (!) 97 3 °F (36 3 °C) 73 18 (!) 199/91 97 %      Temp Source Heart Rate Source Patient Position - Orthostatic VS BP Location FiO2 (%)   Oral Monitor Lying Right arm --      Pain Score       8             Orthostatic Vital Signs  Vitals:    02/03/20 1317 02/03/20 1555   BP: (!) 199/91 134/61   Pulse: 73 81   Patient Position - Orthostatic VS: Lying Sitting       Physical Exam   Constitutional: She is oriented to person, place, and time   She appears well-developed and well-nourished  HENT:   Head: Normocephalic and atraumatic  Nose: Nose normal    Mouth/Throat: Oropharynx is clear and moist    Eyes: Pupils are equal, round, and reactive to light  EOM are normal  Right eye exhibits no discharge  Left eye exhibits no discharge  Neck: Normal range of motion  Neck supple  No JVD present  No tracheal deviation present  Cardiovascular: Normal rate, regular rhythm, normal heart sounds and intact distal pulses  Exam reveals no gallop and no friction rub  No murmur heard  Pulmonary/Chest: Effort normal  No stridor  No respiratory distress  She has wheezes in the right upper field, the right middle field, the right lower field, the left upper field, the left middle field and the left lower field  She has no rales  She exhibits no tenderness  Abdominal: Soft  Bowel sounds are normal  She exhibits no distension  There is no tenderness  There is no rebound and no guarding  Musculoskeletal: Normal range of motion  She exhibits no edema, tenderness or deformity  Lymphadenopathy:     She has no cervical adenopathy  Neurological: She is alert and oriented to person, place, and time  No cranial nerve deficit or sensory deficit  She exhibits normal muscle tone  Coordination normal    Skin: Skin is warm and dry  No rash noted  She is not diaphoretic  No erythema  No pallor  Psychiatric: She has a normal mood and affect  Her behavior is normal  Thought content normal    Nursing note and vitals reviewed        ED Medications  Medications   albuterol inhalation solution 10 mg (10 mg Nebulization Given 2/3/20 1450)     And   ipratropium (ATROVENT) 0 02 % inhalation solution 0 5 mg (0 5 mg Nebulization Given 2/3/20 1450)     And   sodium chloride 0 9 % inhalation solution 3 mL (3 mL Nebulization Given 2/3/20 1450)   predniSONE tablet 60 mg (60 mg Oral Given 2/3/20 1459)   ondansetron (ZOFRAN-ODT) dispersible tablet 4 mg (4 mg Oral Given 2/3/20 1500)       Diagnostic Studies  Results Reviewed     None                 XR chest 2 views   Final Result by Smitha Mauricio MD (02/03 8207)      No acute cardiopulmonary disease  Workstation performed: [id number]               Procedures  Procedures      ED Course                               MDM  Number of Diagnoses or Management Options  Asthma exacerbation:   Cough:   SOB (shortness of breath):   Diagnosis management comments: 64-year-old female history of asthma, hypertension, remote history of breast cancer coming in today with 3 days of worsening shortness of breath, audible wheezing, cough productive of greenish-yellow sputum  Plan to check EKG, CXR, and treat with LARKIN neb and PO prednisone  Will not draw labs at this time as patient has not been vomiting, has no GI symptoms, is non-toxic appearing, and low suspicion for systemic involvement  Patient significantly improved after neb and PO steroids  Minimal wheezing diffusely  Patient requesting to go home  CXR WNL  EKG WNL  Shared decision making with patient about staying overnight and patient opted to go home with PO steroid burst  Patient also interested in home nebulizer as she was having difficultly properly using her inhalers and did not like her Symbicort inhaler  Patient signed DME paperwork for nebulizer and patient discharged with instructions and return precautions          Amount and/or Complexity of Data Reviewed  Clinical lab tests: ordered and reviewed  Tests in the radiology section of CPT®: ordered and reviewed  Tests in the medicine section of CPT®: ordered and reviewed  Review and summarize past medical records: yes  Independent visualization of images, tracings, or specimens: yes    Risk of Complications, Morbidity, and/or Mortality  Presenting problems: moderate  Diagnostic procedures: low  Management options: low    Patient Progress  Patient progress: improved        Disposition  Final diagnoses:   Asthma exacerbation   Cough   SOB (shortness of breath)     Time reflects when diagnosis was documented in both MDM as applicable and the Disposition within this note     Time User Action Codes Description Comment    2/3/2020  3:19 PM Tressa Kramer Add [D97 975] Asthma exacerbation     2/3/2020  3:19 PM Tressa Kramer Add [R05] Cough     2/3/2020  3:20 PM Tressa Kramer Add [R06 02] SOB (shortness of breath)       ED Disposition     ED Disposition Condition Date/Time Comment    Discharge Stable Mon Feb 3, 2020  4:38 PM Vanessa Holliday discharge to home/self care              Follow-up Information     Follow up With Specialties Details Why Contact Info Additional Princess 72, CRNP Nurse Practitioner Schedule an appointment as soon as possible for a visit   [id number] [facility] Emergency Department Emergency Medicine Go to  If symptoms worsen [address]  [phone]  ED, [address] [place] [address], [address]   [phone]          Discharge Medication List as of 2/3/2020  4:46 PM      START taking these medications    Details   albuterol (2 5 mg/3 mL) 0 083 % nebulizer solution Take 1 vial (2 5 mg total) by nebulization every 6 (six) hours as needed for wheezing or shortness of breath for up to 25 doses, Starting Mon 2/3/2020, Print      ipratropium (ATROVENT) 0 02 % nebulizer solution Take 1 vial (0 5 mg total) by nebulization 4 (four) times a day, Starting Mon 2/3/2020, Print      predniSONE 20 mg tablet Take 3 tablets (60 mg total) by mouth daily for 4 days, Starting Mon 2/3/2020, Until Fri 2/7/2020, Print         CONTINUE these medications which have NOT CHANGED    Details   albuterol (PROVENTIL HFA,VENTOLIN HFA) 90 mcg/act inhaler Inhale 2 puffs every 6 (six) hours as needed for wheezing, Starting Mon 1/22/2018, Print      anastrozole (ARIMIDEX) 1 mg tablet Take 1 tablet (1 mg total) by mouth daily, Starting Mon 8/12/2019, Normal      aspirin (ECOTRIN LOW STRENGTH) 81 mg EC tablet Take 81 mg by mouth daily, Historical Med      budesonide-formoterol (SYMBICORT) 160-4 5 mcg/act inhaler Inhale 2 puffs 2 (two) times a day Rinse mouth after use , Historical Med      buPROPion (WELLBUTRIN) 75 mg tablet Take 75 mg by mouth once, Historical Med      busPIRone (BUSPAR) 10 mg tablet Take 10 mg by mouth 2 (two) times a day, Historical Med      cholecalciferol (VITAMIN D3) 1,000 units tablet Take 1,000 Units by mouth daily, Historical Med      hydrochlorothiazide (HYDRODIURIL) 25 mg tablet Take 25 mg by mouth daily, Historical Med      omeprazole (PriLOSEC) 20 mg delayed release capsule Take 20 mg by mouth daily, Historical Med      albuterol (ACCUNEB) 1 25 MG/3ML nebulizer solution Take 3 mL by nebulization every 6 (six) hours as needed for wheezing, Starting Mon 1/22/2018, Print      hydrocortisone (ANUSOL-HC) 2 5 % rectal cream Apply rectally 2 times daily, Print      naproxen (EC NAPROSYN) 500 MG EC tablet Take 1 tablet (500 mg total) by mouth 2 (two) times a day with meals, Starting Mon 2/4/2019, Until Tue 2/4/2020, Print           No discharge procedures on file  ED Provider  Attending physically available and evaluated Vernon Margy PHILIP managed the patient along with the ED Attending      Electronically Signed by         Ely Martinez MD  02/03/20 7137

## 2020-02-03 NOTE — DISCHARGE INSTRUCTIONS
You came to the emergency department today with shortness of breath and wheezing  We treated you with a nebulizer with albuterol and oral steroids  We also took a chest x-ray that looked normal   You had significant improvement and went to go home  We are also sending you home with a home nebulizer  Please return for significantly worsening shortness of breath or any other concerning signs or symptoms  Please refer to the followiong documents for additional instructions and return precautions

## 2020-02-03 NOTE — ED ATTENDING ATTESTATION
2/3/2020  I, Matthew Wayne MD, saw and evaluated the patient  I have discussed the patient with the resident/non-physician practitioner and agree with the resident's/non-physician practitioner's findings, Plan of Care, and MDM as documented in the resident's/non-physician practitioner's note, except where noted  All available labs and Radiology studies were reviewed  I was present for key portions of any procedure(s) performed by the resident/non-physician practitioner and I was immediately available to provide assistance  At this point I agree with the current assessment done in the Emergency Department  I have conducted an independent evaluation of this patient a history and physical is as follows:   Pt has history of asthma  Pt has not been using meds last 3 days just worsening symptoms No chest pain pt co some sob no fevers   No vomiting or gi symptoms PE; alert heart reg lungs wheezes bilat abd soft nontender ext nad MDM; will do albuterol and steroids reeval  ED Course         Critical Care Time  Procedures

## 2020-02-13 ENCOUNTER — HOSPITAL ENCOUNTER (EMERGENCY)
Facility: HOSPITAL | Age: 55
Discharge: HOME | End: 2020-02-13
Attending: EMERGENCY MEDICINE
Payer: COMMERCIAL

## 2020-02-13 VITALS
SYSTOLIC BLOOD PRESSURE: 188 MMHG | WEIGHT: 182 LBS | RESPIRATION RATE: 15 BRPM | DIASTOLIC BLOOD PRESSURE: 88 MMHG | TEMPERATURE: 97.5 F | HEART RATE: 79 BPM | BODY MASS INDEX: 33.29 KG/M2 | OXYGEN SATURATION: 96 %

## 2020-02-13 DIAGNOSIS — K64.4 EXTERNAL HEMORRHOID: ICD-10-CM

## 2020-02-13 DIAGNOSIS — K62.3 PARTIAL RECTAL PROLAPSE: Primary | ICD-10-CM

## 2020-02-13 PROCEDURE — 99282 EMERGENCY DEPT VISIT SF MDM: CPT

## 2020-02-13 PROCEDURE — 99284 EMERGENCY DEPT VISIT MOD MDM: CPT | Performed by: EMERGENCY MEDICINE

## 2020-02-13 RX ORDER — POLYETHYLENE GLYCOL 3350 17 G/17G
17 POWDER, FOR SOLUTION ORAL DAILY
Qty: 28 EACH | Refills: 0 | Status: SHIPPED | OUTPATIENT
Start: 2020-02-13 | End: 2020-03-12

## 2020-02-13 NOTE — ED PROVIDER NOTES
Final Diagnosis:  1  Partial rectal prolapse; spontaneously reducing    2  External hemorrhoid (very small)      Chief Complaint   Patient presents with    Hemorrhoids     PT HAS HX OF CHRONIC HEMORRHOIDS  PT NOTES EXTERNAL HEMORRHOIDS WITH INCREASED PAIN  This is a 62 y o  F presenting for evaluation of partial rectal prolapse with chronic hemorroids  She states that she has a very long history of external hemorrhoids  About a week ago she started knows that she was having it herniate out, very painful  She has been using some type of topical cream, Preparation-H  She followed up [facility] for evaluation, she was told that she has external hemorrhoids and to continue using preparation H  She feels it is increasingly painful and enlarging so came in for evaluation  No fevers chills nausea vomiting chest pain shortness breath belly pain back pain  No dizziness lightheadedness  No blood in her stool  She is chronically constipated  No stool softening agents  PE:   Vitals:    02/13/20 0944 02/13/20 0946   BP:  (!) 188/88   Pulse:  79   Resp:  15   Temp:  97 5 °F (36 4 °C)   SpO2:  96%   Weight: 82 6 kg (182 lb)    General: VSS, NAD, awake, alert  Well-nourished, well-developed  Appears stated age  Speaking normally in full sentences  Head: Normocephalic, atraumatic, nontender  Eyes: PERRL, EOM-I  No diplopia  No hyphema  No subconjunctival hemorrhages  Symmetrical lids  ENT: Atraumatic external nose and ears  MMM  No malocclusion  No stridor  Normal phonation  No drooling  Normal swallowing  Neck: Symmetric, trachea midline  No JVD  CV: RRR  +S1/S2  No murmurs or gallops  Peripheral pulses +2 throughout  No chest wall tenderness  Lungs:   Unlabored No retractions  CTAB, lungs sounds equal bilateral    No tachypnea  Abd: +BS, soft, NT/ND    MSK:   FROM   Back:   No rashes  Skin: Dry, intact  Neuro: AAOx3, GCS 15, CN II-XII grossly intact     Motor grossly intact  Psychiatric/Behavioral: Appropriate mood and affect   Exam: Exam done with tech Pollie Meigs in the room  There's a red area that is barely visible while patient was LEFT lateral decubitous  When she bared down, it didn't increase in size  It was mildly tender  Looked more like a tiny rectal prolapse rather than hemorroid  With digital exam, she had a small tender external hemorroid  No beleding  A:  - small externa lhemorroid, likely incidental  - partial, self-reducing rectal prolapse  P:  - discussed f/u with colorectal  - discussed RTER precautions  - yuni wanted an oncologist's information for unclear reasons  I said I'd provide  - 13 point ROS was performed and all are normal unless stated in the history above  - Nursing note reviewed  Vitals reviewed  - Orders placed by myself and/or advanced practitioner / resident     - Previous chart was reviewed  - No language barrier    - History obtained from patient  - There are no limitations to the history obtained  - Critical care time: Not applicable for this patient  Medications - No data to display  No orders to display     No orders of the defined types were placed in this encounter      Labs Reviewed - No data to display  Time reflects when diagnosis was documented in both MDM as applicable and the Disposition within this note     Time User Action Codes Description Comment    2/13/2020 10:00 AM [place] Leisure Add [K62 3] Partial rectal prolapse     2/13/2020 10:00 AM [place] Leisure Add [K64 4] External hemorrhoid     2/13/2020 10:00 AM [place] Leisure Modify [K64 4] External hemorrhoid (very small)     2/13/2020 10:00 AM Vangie Leisure Modify [K62 3] Partial rectal prolapse; spontaneously reducin     2/13/2020 10:00 AM [place] Leisure Modify [K62 3] Partial rectal prolapse; spontaneously reducing       ED Disposition     ED Disposition Condition Date/Time Comment    Discharge Stable u Feb 13, 2020 10:03 AM Vanessa Jonelle Pettit discharge to home/self care  Follow-up Information     Follow up With Specialties Details Why Contact Info Additional [address] Emergency Department Emergency Medicine Go to  If symptoms worsen [address]  [phone]  ED, [address], [facility] 108    Fran Phlegm, [address] Nurse Practitioner Call in 1 day To make appointment for re-evaluation in 1 week [facility], [address]       Ashlyn Araujo MD Colon and Rectal Surgery Call in 1 day To make appt  for evaluation within the next month [facility] [address], MD Hematology and Oncology, Hematology, Oncology Call  To make appt  for evaluation in the next month [address]  [phone]           Patient's Medications   Discharge Prescriptions    HYDROCORTISONE (ANUSOL-HC) 2 5 % RECTAL CREAM    Apply rectally 2 times daily       Start Date: 2/13/2020 End Date: --       Order Dose: --       Quantity: 28 35 g    Refills: 0    NIFEDIPINE 0 3%-LIDOCAINE 5% RECTAL OINTMENT    Apply 1 application topically every 6 (six) hours as needed for discomfort or pain for up to 2 days Apply a small amount to hemorroid       Start Date: 2/13/2020 End Date: 2/15/2020       Order Dose: 1 application       Quantity: 2 oz    Refills: 0    POLYETHYLENE GLYCOL (MIRALAX) 17 G PACKET    Take 17 g by mouth daily for 28 days       Start Date: 2/13/2020 End Date: 3/12/2020       Order Dose: 17 g       Quantity: 28 each    Refills: 0     No discharge procedures on file  Prior to Admission Medications   Prescriptions Last Dose Informant Patient Reported? Taking?    albuterol (2 5 mg/3 mL) 0 083 % nebulizer solution   No No   Sig: Take 1 vial (2 5 mg total) by nebulization every 6 (six) hours as needed for wheezing or shortness of breath for up to 25 doses   albuterol (ACCUNEB) 1 25 MG/3ML nebulizer solution   No No   Sig: Take 3 mL by nebulization every 6 (six) hours as needed for wheezing   Patient not taking: Reported on 2/3/2020   albuterol (PROVENTIL HFA,VENTOLIN HFA) 90 mcg/act inhaler   No No   Sig: Inhale 2 puffs every 6 (six) hours as needed for wheezing   anastrozole (ARIMIDEX) 1 mg tablet   No No   Sig: Take 1 tablet (1 mg total) by mouth daily   aspirin (ECOTRIN LOW STRENGTH) 81 mg EC tablet   Yes No   Sig: Take 81 mg by mouth daily   buPROPion (WELLBUTRIN) 75 mg tablet   Yes No   Sig: Take 75 mg by mouth once   budesonide-formoterol (SYMBICORT) 160-4 5 mcg/act inhaler   Yes No   Sig: Inhale 2 puffs 2 (two) times a day Rinse mouth after use  busPIRone (BUSPAR) 10 mg tablet   Yes No   Sig: Take 10 mg by mouth 2 (two) times a day   cholecalciferol (VITAMIN D3) 1,000 units tablet   Yes No   Sig: Take 1,000 Units by mouth daily   hydrochlorothiazide (HYDRODIURIL) 25 mg tablet  Self Yes No   Sig: Take 25 mg by mouth daily   hydrocortisone (ANUSOL-HC) 2 5 % rectal cream   No No   Sig: Apply rectally 2 times daily   Patient not taking: Reported on 2/3/2020   ipratropium (ATROVENT) 0 02 % nebulizer solution   No No   Sig: Take 1 vial (0 5 mg total) by nebulization 4 (four) times a day   naproxen (EC NAPROSYN) 500 MG EC tablet   No No   Sig: Take 1 tablet (500 mg total) by mouth 2 (two) times a day with meals   omeprazole (PriLOSEC) 20 mg delayed release capsule   Yes No   Sig: Take 20 mg by mouth daily      Facility-Administered Medications: None       Portions of the record may have been created with voice recognition software  Occasional wrong word or "sound a like" substitutions may have occurred due to the inherent limitations of voice recognition software  Read the chart carefully and recognize, using context, where substitutions have occurred      Electronically signed by:  Karina Lloyd MD  02/13/20 5967

## 2020-02-26 DIAGNOSIS — Z12.31 VISIT FOR SCREENING MAMMOGRAM: Primary | ICD-10-CM

## 2020-02-27 ENCOUNTER — TELEPHONE (OUTPATIENT)
Dept: SURGICAL ONCOLOGY | Facility: CLINIC | Age: 55
End: 2020-02-27

## 2020-07-20 ENCOUNTER — TELEPHONE (OUTPATIENT)
Dept: SURGICAL ONCOLOGY | Facility: CLINIC | Age: 55
End: 2020-07-20

## 2020-07-30 DIAGNOSIS — Z17.0 MALIGNANT NEOPLASM OF RIGHT BREAST IN FEMALE, ESTROGEN RECEPTOR POSITIVE, UNSPECIFIED SITE OF BREAST (HCC): ICD-10-CM

## 2020-07-30 DIAGNOSIS — C50.911 MALIGNANT NEOPLASM OF RIGHT BREAST IN FEMALE, ESTROGEN RECEPTOR POSITIVE, UNSPECIFIED SITE OF BREAST (HCC): ICD-10-CM

## 2020-07-31 RX ORDER — ANASTROZOLE 1 MG/1
TABLET ORAL
Qty: 90 TABLET | Refills: 2 | Status: SHIPPED | OUTPATIENT
Start: 2020-07-31 | End: 2021-09-20 | Stop reason: SDUPTHER

## 2020-08-13 ENCOUNTER — TELEPHONE (OUTPATIENT)
Dept: HEMATOLOGY ONCOLOGY | Facility: CLINIC | Age: 55
End: 2020-08-13

## 2020-08-13 NOTE — TELEPHONE ENCOUNTER
Called patient and left voice message on 8/13 to have labs collected before her Monday appointment with Ana Rocha stated that the orders are already in the system and that patient will NOT need any paper work to have the labs collected as long as she goes to a [facility] lab  Stated that if patient has any questions and or concerns to please give the office a call back

## 2020-08-14 ENCOUNTER — TELEPHONE (OUTPATIENT)
Dept: HEMATOLOGY ONCOLOGY | Facility: CLINIC | Age: 55
End: 2020-08-14

## 2020-09-09 ENCOUNTER — TELEPHONE (OUTPATIENT)
Dept: HEMATOLOGY ONCOLOGY | Facility: MEDICAL CENTER | Age: 55
End: 2020-09-09

## 2020-09-09 NOTE — TELEPHONE ENCOUNTER
Patient called in to schedule an appointment 09/14/2020 with Dr Suhas Madrid and also needs [facility] please call back patient [phone]

## 2020-09-09 NOTE — TELEPHONE ENCOUNTER
Patient has the appointment for 9/14 which was scheduled by susie today 9/9-- I emailed [facility] to have patient set up  Will call patient back once I talk to [facility]

## 2020-09-10 ENCOUNTER — HOSPITAL ENCOUNTER (EMERGENCY)
Facility: HOSPITAL | Age: 55
Discharge: HOME | End: 2020-09-10
Attending: EMERGENCY MEDICINE | Admitting: EMERGENCY MEDICINE
Payer: COMMERCIAL

## 2020-09-10 VITALS
OXYGEN SATURATION: 98 % | HEART RATE: 71 BPM | TEMPERATURE: 97.8 F | RESPIRATION RATE: 16 BRPM | SYSTOLIC BLOOD PRESSURE: 168 MMHG | DIASTOLIC BLOOD PRESSURE: 79 MMHG

## 2020-09-10 DIAGNOSIS — K62.5 RECTAL BLEEDING: ICD-10-CM

## 2020-09-10 DIAGNOSIS — K64.8 HEMORRHOID PROLAPSE: Primary | ICD-10-CM

## 2020-09-10 LAB
ALBUMIN SERPL BCP-MCNC: 3.6 G/DL (ref 3.5–5)
ALP SERPL-CCNC: 122 U/L (ref 46–116)
ALT SERPL W P-5'-P-CCNC: 39 U/L (ref 12–78)
ANION GAP SERPL CALCULATED.3IONS-SCNC: 5 MMOL/L (ref 4–13)
APTT PPP: 46 SECONDS (ref 23–37)
AST SERPL W P-5'-P-CCNC: 36 U/L (ref 5–45)
BASOPHILS # BLD AUTO: 0.04 THOUSANDS/ΜL (ref 0–0.1)
BASOPHILS NFR BLD AUTO: 1 % (ref 0–1)
BILIRUB SERPL-MCNC: 0.29 MG/DL (ref 0.2–1)
BUN SERPL-MCNC: 22 MG/DL (ref 5–25)
CALCIUM SERPL-MCNC: 9.3 MG/DL (ref 8.3–10.1)
CHLORIDE SERPL-SCNC: 104 MMOL/L (ref 100–108)
CO2 SERPL-SCNC: 27 MMOL/L (ref 21–32)
CREAT SERPL-MCNC: 1.21 MG/DL (ref 0.6–1.3)
EOSINOPHIL # BLD AUTO: 0.24 THOUSAND/ΜL (ref 0–0.61)
EOSINOPHIL NFR BLD AUTO: 3 % (ref 0–6)
ERYTHROCYTE [DISTWIDTH] IN BLOOD BY AUTOMATED COUNT: 15.5 % (ref 11.6–15.1)
GFR SERPL CREATININE-BSD FRML MDRD: 50 ML/MIN/1.73SQ M
GLUCOSE SERPL-MCNC: 108 MG/DL (ref 65–140)
HCT VFR BLD AUTO: 32.3 % (ref 34.8–46.1)
HGB BLD-MCNC: 10.5 G/DL (ref 11.5–15.4)
IMM GRANULOCYTES # BLD AUTO: 0.06 THOUSAND/UL (ref 0–0.2)
IMM GRANULOCYTES NFR BLD AUTO: 1 % (ref 0–2)
INR PPP: 0.95 (ref 0.84–1.19)
LYMPHOCYTES # BLD AUTO: 2.91 THOUSANDS/ΜL (ref 0.6–4.47)
LYMPHOCYTES NFR BLD AUTO: 38 % (ref 14–44)
MCH RBC QN AUTO: 29 PG (ref 26.8–34.3)
MCHC RBC AUTO-ENTMCNC: 32.5 G/DL (ref 31.4–37.4)
MCV RBC AUTO: 89 FL (ref 82–98)
MONOCYTES # BLD AUTO: 0.54 THOUSAND/ΜL (ref 0.17–1.22)
MONOCYTES NFR BLD AUTO: 7 % (ref 4–12)
NEUTROPHILS # BLD AUTO: 3.88 THOUSANDS/ΜL (ref 1.85–7.62)
NEUTS SEG NFR BLD AUTO: 50 % (ref 43–75)
NRBC BLD AUTO-RTO: 0 /100 WBCS
PLATELET # BLD AUTO: 327 THOUSANDS/UL (ref 149–390)
PMV BLD AUTO: 10.3 FL (ref 8.9–12.7)
POTASSIUM SERPL-SCNC: 3.3 MMOL/L (ref 3.5–5.3)
PROT SERPL-MCNC: 7.6 G/DL (ref 6.4–8.2)
PROTHROMBIN TIME: 12.7 SECONDS (ref 11.6–14.5)
RBC # BLD AUTO: 3.62 MILLION/UL (ref 3.81–5.12)
SODIUM SERPL-SCNC: 136 MMOL/L (ref 136–145)
WBC # BLD AUTO: 7.67 THOUSAND/UL (ref 4.31–10.16)

## 2020-09-10 PROCEDURE — 85730 THROMBOPLASTIN TIME PARTIAL: CPT | Performed by: EMERGENCY MEDICINE

## 2020-09-10 PROCEDURE — 99284 EMERGENCY DEPT VISIT MOD MDM: CPT

## 2020-09-10 PROCEDURE — 99284 EMERGENCY DEPT VISIT MOD MDM: CPT | Performed by: EMERGENCY MEDICINE

## 2020-09-10 PROCEDURE — 36415 COLL VENOUS BLD VENIPUNCTURE: CPT | Performed by: EMERGENCY MEDICINE

## 2020-09-10 PROCEDURE — 85610 PROTHROMBIN TIME: CPT | Performed by: EMERGENCY MEDICINE

## 2020-09-10 PROCEDURE — 96360 HYDRATION IV INFUSION INIT: CPT

## 2020-09-10 PROCEDURE — 80053 COMPREHEN METABOLIC PANEL: CPT | Performed by: EMERGENCY MEDICINE

## 2020-09-10 PROCEDURE — 85025 COMPLETE CBC W/AUTO DIFF WBC: CPT | Performed by: EMERGENCY MEDICINE

## 2020-09-10 RX ORDER — POTASSIUM CHLORIDE 20 MEQ/1
40 TABLET, EXTENDED RELEASE ORAL ONCE
Status: COMPLETED | OUTPATIENT
Start: 2020-09-10 | End: 2020-09-10

## 2020-09-10 RX ORDER — HYDROCORTISONE 25 MG/G
CREAM TOPICAL 2 TIMES DAILY
Qty: 1 TUBE | Refills: 0 | Status: SHIPPED | OUTPATIENT
Start: 2020-09-10

## 2020-09-10 RX ADMIN — POTASSIUM CHLORIDE 40 MEQ: 1500 TABLET, EXTENDED RELEASE ORAL at 12:03

## 2020-09-10 RX ADMIN — SODIUM CHLORIDE 1000 ML: 0.9 INJECTION, SOLUTION INTRAVENOUS at 11:07

## 2020-09-10 NOTE — ED PROVIDER NOTES
Emergency Department Note- Polly Cui 62 y o  female MRN: [id number]    Unit/Bed#: ED 08 Encounter: [id number]        History of Present Illness   HPI:  Polly Cui is a 62 y o  female who presents with rectal bleeding patient has history of partial rectal prolapse in the past and hemorrhoids she has had this problem in the past she has not seen a gastroenterologist and she has not had a colonoscopy past medical history includes breast cancer she is currently on hormonal therapy   initially diagnosed in 2014 status post mastectomy chemotherapy  History of bipolar disorder history of asthma  Patient is on baby aspirin  Patient denies headache denies lightheadedness denies abdominal pain denies vomiting denies melena  Patient has not had a bowel movements yesterday there has been no blood mixed with the stool it seems to be mostly after she wipes that she has the bleeding  Review of Systems  REVIEW OF SYSTEMS  Constitutional:  denies fever, chills, no weight loss   Eyes:  Denies visual changes, denies eye pain    HENT:  Denies nasal congestion or sore throat   Respiratory:  Denies cough or shortness of breath, denies hemoptysis    Cardiovascular:  Denies chest pain, palpitations, or leg edema    GI:  Denies abdominal pain, nausea, vomiting, positive bloody stools, negative melena, or diarrhea   :  Denies dysuria, hematuria, polyuria   Musculoskeletal:  Denies back pain or joint pain   Integument:  Denies rash, denies color change    Neurologic:  Denies headache, focal weakness or sensory changes   Endocrine:  Denies polyuria or polydipsia   Lymphatic:  Denies swollen glands   Psychiatric:   positive depression or positive anxiety     All system reviewed and negative except as noted above or in HPI    Historical Information   Past Medical History:   Diagnosis Date    Asthma     [facility]     [facility] ([facility] )     breast cancer    Hypertension     Psychiatric disorder     depression and anxiety Past Surgical History:   Procedure Laterality Date    MASTECTOMY  2014    Right side     Social History   Social History     Substance and Sexual Activity   Alcohol Use No     Social History     Substance and Sexual Activity   Drug Use No     Social History     Tobacco Use   Smoking Status Current Some Day Smoker    Packs/day: 0 00    Types: Cigarettes    Last attempt to quit: 2020    Years since quittin 6   Smokeless Tobacco Never Used     Family History: History reviewed  No pertinent family history  Meds/Allergies   (Not in a hospital admission)    No Known Allergies    Objective   Vitals: Blood pressure 168/79, pulse 71, temperature 97 8 °F (36 6 °C), temperature source Oral, resp  rate 16, SpO2 98 %  PHYSICAL EXAM  Constitutional:  Well developed, well nourished, no acute distress, non toxic appearance   Eyes:   PERRL, EOMI, conjunctiva normal, sclera anicteric, no proptosis   HENT:  Atraumatic, external ears normal, nose normal, oropharynx moist, no pharyngeal exudates  Neck  no JVD, no bruits,  normal range of motion, no tenderness, supple, thyroid normal    Respiratory:  No respiratory distress, normal breath sounds B/L, no rales, no wheezing     Cardiovascular:  NSR, no M/G/R  GI:  Soft,  Normal BS,  ND,  NT,  No mass or bruits there is a very small rectal prolapse there is poor rectal tone however the rectal prolapse was easily reduced without any difficulty there is also a hemorrhoid noted heme-negative stool no gross blood on digital rectal exam no mass noted  :  No costovertebral angle tenderness   Extremities: No edema, no tenderness, no deformities   Normal pulses   Musculoskeletal:   Back - no midline tenderness,  FROM  Upper and lower extremities   SKIN:   no rash, warm and dry    Neurologic:  Alert & oriented x 3, CN 2-12 intact, normal motor function, normal sensory function, no focal deficits noted, gait normal   Psychiatric:  Speech and behavior appropriate normal judgement and insight  patient is somewhat anxious she is not psychotic    Lab Results: Lab Results: I have personally reviewed pertinent lab results  Labs Reviewed   COMPREHENSIVE METABOLIC PANEL - Abnormal       Result Value Ref Range Status    Sodium 136  136 - 145 mmol/L Final    Potassium 3 3 (*) 3 5 - 5 3 mmol/L Final    Chloride 104  100 - 108 mmol/L Final    CO2 27  21 - 32 mmol/L Final    ANION GAP 5  4 - 13 mmol/L Final    BUN 22  5 - 25 mg/dL Final    Creatinine 1 21  0 60 - 1 30 mg/dL Final    Comment: Standardized to IDMS reference method    Glucose 108  65 - 140 mg/dL Final    Comment: If the patient is fasting, the ADA then defines impaired fasting glucose as > 100 mg/dL and diabetes as > or equal to 123 mg/dL  Specimen collection should occur prior to Sulfasalazine administration due to the potential for falsely depressed results  Specimen collection should occur prior to Sulfapyridine administration due to the potential for falsely elevated results  Calcium 9 3  8 3 - 10 1 mg/dL Final    AST 36  5 - 45 U/L Final    Comment: Specimen collection should occur prior to Sulfasalazine administration due to the potential for falsely depressed results  ALT 39  12 - 78 U/L Final    Comment: Specimen collection should occur prior to Sulfasalazine and/or Sulfapyridine administration due to the potential for falsely depressed results  Alkaline Phosphatase 122 (*) 46 - 116 U/L Final    Total Protein 7 6  6 4 - 8 2 g/dL Final    Albumin 3 6  3 5 - 5 0 g/dL Final    Total Bilirubin 0 29  0 20 - 1 00 mg/dL Final    Comment: Use of this assay is not recommended for patients undergoing treatment with eltrombopag due to the potential for falsely elevated results      eGFR 50  ml/min/1 73sq m Final    Narrative:     [place] guidelines for Chronic Kidney Disease (CKD):     Stage 1 with normal or high GFR (GFR > 90 mL/min/1 73 square meters)    Stage 2 Mild CKD (GFR = 60-89 mL/min/1 73 square meters)    Stage 3A Moderate CKD (GFR = 45-59 mL/min/1 73 square meters)    Stage 3B Moderate CKD (GFR = 30-44 mL/min/1 73 square meters)    Stage 4 Severe CKD (GFR = 15-29 mL/min/1 73 square meters)    Stage 5 End Stage CKD (GFR <15 mL/min/1 73 square meters)  Note: GFR calculation is accurate only with a steady state creatinine   CBC AND DIFFERENTIAL - Abnormal    WBC 7 67  4 31 - 10 16 Thousand/uL Final    RBC 3 62 (*) 3 81 - 5 12 Million/uL Final    Hemoglobin 10 5 (*) 11 5 - 15 4 g/dL Final    Hematocrit 32 3 (*) 34 8 - 46 1 % Final    MCV 89  82 - 98 fL Final    MCH 29 0  26 8 - 34 3 pg Final    MCHC 32 5  31 4 - 37 4 g/dL Final    RDW 15 5 (*) 11 6 - 15 1 % Final    MPV 10 3  8 9 - 12 7 fL Final    Platelets 423  304 - 390 Thousands/uL Final    nRBC 0  /100 WBCs Final    Neutrophils Relative 50  43 - 75 % Final    Immat GRANS % 1  0 - 2 % Final    Lymphocytes Relative 38  14 - 44 % Final    Monocytes Relative 7  4 - 12 % Final    Eosinophils Relative 3  0 - 6 % Final    Basophils Relative 1  0 - 1 % Final    Neutrophils Absolute 3 88  1 85 - 7 62 Thousands/µL Final    Immature Grans Absolute 0 06  0 00 - 0 20 Thousand/uL Final    Lymphocytes Absolute 2 91  0 60 - 4 47 Thousands/µL Final    Monocytes Absolute 0 54  0 17 - 1 22 Thousand/µL Final    Eosinophils Absolute 0 24  0 00 - 0 61 Thousand/µL Final    Basophils Absolute 0 04  0 00 - 0 10 Thousands/µL Final   APTT - Abnormal    PTT 46 (*) 23 - 37 seconds Final    Comment: Therapeutic Heparin Range =  60-90 seconds   PROTIME-INR - Normal    Protime 12 7  11 6 - 14 5 seconds Final    INR 0 95  0 84 - 1 19 Final     Imaging: I have personally reviewed pertinent reports      No orders to display     EKG, Pathology, and Other Studies: I have personally reviewed pertinent films in PACS    baseline hemoglobin 11 2 from 2 years ago    Today's hemoglobin is 10 5    Patient will revert to Colorectal for colonoscopy and evaluation of her hemorrhoid and intermittent partial rectal prolapse  Patient will be given Anusol    Assessment/Plan     ED Medical Decision Making:  Hemorrhoids with partial rectal prolapse  Will check hemoglobin and renal function  1  Hemorrhoid prolapse    2   Rectal bleeding           Darlene Gerber MD  09/11/20 [id number]

## 2020-09-10 NOTE — DISCHARGE INSTRUCTIONS
Please follow-up with the Colorectal Service  You will likely need a colonoscopy   They can also evaluate your partial rectal prolapse and your hemorrhoids

## 2020-09-14 ENCOUNTER — OFFICE VISIT (OUTPATIENT)
Dept: HEMATOLOGY ONCOLOGY | Facility: CLINIC | Age: 55
End: 2020-09-14
Payer: COMMERCIAL

## 2020-09-14 VITALS
TEMPERATURE: 95.4 F | OXYGEN SATURATION: 97 % | DIASTOLIC BLOOD PRESSURE: 80 MMHG | BODY MASS INDEX: 33.13 KG/M2 | SYSTOLIC BLOOD PRESSURE: 128 MMHG | HEART RATE: 81 BPM | WEIGHT: 180 LBS | HEIGHT: 62 IN | RESPIRATION RATE: 18 BRPM

## 2020-09-14 DIAGNOSIS — Z17.0 MALIGNANT NEOPLASM OF RIGHT BREAST IN FEMALE, ESTROGEN RECEPTOR POSITIVE, UNSPECIFIED SITE OF BREAST (HCC): Primary | ICD-10-CM

## 2020-09-14 DIAGNOSIS — C50.911 MALIGNANT NEOPLASM OF RIGHT BREAST IN FEMALE, ESTROGEN RECEPTOR POSITIVE, UNSPECIFIED SITE OF BREAST (HCC): Primary | ICD-10-CM

## 2020-09-14 PROCEDURE — 99214 OFFICE O/P EST MOD 30 MIN: CPT | Performed by: INTERNAL MEDICINE

## 2020-09-14 NOTE — PROGRESS NOTES
Hematology / Oncology Outpatient Follow Up Note    Kareem Persaud 62 y o  female :1965 VJL:[id number]         Date:  2020    Assessment / Plan:  A 56 year old postmenopausal woman with stage IIB right breast cancer, grade 3, ER/VA positive, HER-2 negative disease  She had one positive lymph node  She underwent mastectomy with axillary lymph node dissection, without reconstruction resulting in the EDIS  She has very high Oncotype DX recurrence score was 61  She completed adjuvant chemotherapy with [facility] followed by paclitaxel in 2014, followed by adjuvant hormonal therapy with tamoxifen for 5 years until 2019  She is currently on adjuvant hormonal therapy with anastrozole with no significant toxicity  Clinically, she has no evidence recurrent disease  I recommended her to continue with anastrozole 1 mg once a day for 4 more years  She is in agreement with my recommendation  I will see her again in a year for routine follow-up        Subjective:      HPI:  A 46year old perimenopausal woman, who was found to have matted right breast  She underwent ultrasound-guided biopsy in 2013 which showed invasive ductal carcinoma  She was referred to Dr Leonard Donis  She underwent mastectomy without reconstruction as well as right axillary lymph node dissection in 2013  Mastectomy specimen showed 3 8 cm invasive ductal carcinoma with lymphovascular invasion  this was grade 3  One out of 51, axillary lymph node positive for metastatic disease with 1 cm of tumor deposit  Previously, she underwent CT scan of chest, abdomen, pelvis, which showed no evidence of metastatic disease  She presents today to discuss adjuvant treatment options  She has several comorbidities including bipolar disorder, depression, and anxiety  She has a history of stroke 3 times  However, she does not appear to have any residual neurological deficit  She has no complaint of pain   She is to have mild limitation of range of motion of right shoulder after the surgery  she has no weight lossor any respiratory symptoms  Her performance status is normal  Her last menstrual cycle was 2 months ago  She has some hot flashes and some sweating             Interval History:  A 62year-old postmenopausal woman with stage IIB right breast cancer  She had approximately 3 8 cm invasive ductal carcinoma, grade 3  This was ER/WI positive, HER-2 negative disease  She had 1out of 51 positive axillary lymph node with tumor deposit 1 cm  she underwent mastectomy with axillary lymph node dissection resulting in EDIS, followed by adjuvant chemotherapy  Her Oncotype DX score was 61, which is high risk  She was treated with adjuvant chemotherapy with dose dense AC followed by paclitaxel, which was completed in June 2014  She had regular menstrual cycles until February 2014  She was on adjuvant hormonal therapy with tamoxifen for 5 years until August 2019 since she was premenopausal   Since August 2019, she been on anastrozole  She came in today for routine follow-up  She has no complaint of bone pain  She has very occasional hot flashes  She denied any musculoskeletal symptoms  Her weight is stable  She has normal performance status         Objective:      Primary Diagnosis:     Right breast cancer  Stage IIB(pT2, pN1a, M0)  Grade 3  ER/WI positive, HER-2 negative disease  Oncotype DX recurrence score 61  Diagnosed in December 2013       Cancer Staging:  Cancer Staging  No matching staging information was found for the patient         Previous Hematologic/ Oncologic Treatment:      1  Right mastectomy and axillary lymph node dissection  2  adjuvant chemotherapy with dose dense AC X4 followed by weekly paclitaxel x12  Completed in June 2014  3  Adjuvant hormonal therapy with tamoxifen from August 2014 through August 2019       Current Hematologic/ Oncologic Treatment:       Adjuvant hormonal therapy with anastrozole since August 2019     Disease Status:      EDIS status post mastectomy with axillary lymph node dissection       Test Results:     Pathology:     3 8 cm invasive ductal carcinoma with lymphovascular invasion  Grade 3  One out of 51, axillary lymph node positive for metastatic disease with 1 cm of tumor deposit  ER/PA positive, HER-2 negative disease  Stage IIB (pT2, pN1a, M0)  Oncotype DX recurrence score, 61       Radiology:     Mammography in February 2020 was benign  BI-RADS 2       Laboratory:           Physical Exam:        General Appearance:    Alert, oriented          Eyes:    PERRL   Ears:    Normal external ear canals, both ears   Nose:   Nares normal, septum midline   Throat:   Mucosa moist  Pharynx without injection  Neck:   Supple         Lungs:     Clear to auscultation bilaterally   Chest Wall:    No tenderness or deformity    Heart:    Regular rate and rhythm         Abdomen:     Soft, non-tender, bowel sounds +, no organomegaly               Extremities:   Extremities no cyanosis or edema         Skin:   no rash or icterus  Lymph nodes:   Cervical, supraclavicular, and axillary nodes normal   Neurologic:   CNII-XII intact, normal strength, sensation and reflexes     Throughout             Breast exam:   status post right mastectomy without reconstruction  No palpable abnormalities in the right chest wall  Left breast exam is negative             ROS: Review of Systems   All other systems reviewed and are negative  Imaging: No results found        Labs:   Lab Results   Component Value Date    WBC 7 67 09/10/2020    HGB 10 5 (L) 09/10/2020    HCT 32 3 (L) 09/10/2020    MCV 89 09/10/2020     09/10/2020     Lab Results   Component Value Date     07/30/2014    K 3 3 (L) 09/10/2020     09/10/2020    CO2 27 09/10/2020    ANIONGAP 8 07/30/2014    BUN 22 09/10/2020    CREATININE 1 21 09/10/2020    GLUCOSE 102 07/30/2014    GLUF 78 10/01/2018    GLUF 78 10/01/2018    CALCIUM 9 3 09/10/2020 AST 36 09/10/2020    ALT 39 09/10/2020    ALKPHOS 122 (H) 09/10/2020    PROT 6 7 07/30/2014    BILITOT 0 18 (L) 07/30/2014    EGFR 50 09/10/2020           Lab Results   Component Value Date    IRON 62 10/01/2018    TIBC 405 10/01/2018    FERRITIN 111 10/01/2018       Lab Results   Component Value Date    AGVFLVYS76 348 10/01/2018       Lab Results   Component Value Date    FOLATE >20 0 (H) 10/01/2018         Current Medications: Reviewed  Allergies: Reviewed  PMH/FH/SH:  Reviewed      Vital Sign:    Body surface area is 1 83 meters squared      Wt Readings from Last 3 Encounters:   09/14/20 81 6 kg (180 lb)   02/13/20 82 6 kg (182 lb)   02/03/20 82 1 kg (181 lb)        Temp Readings from Last 3 Encounters:   09/14/20 (!) 95 4 °F (35 2 °C) (Tympanic Core)   09/10/20 97 8 °F (36 6 °C) (Oral)   02/13/20 97 5 °F (36 4 °C)        BP Readings from Last 3 Encounters:   09/14/20 128/80   09/10/20 168/79   02/13/20 (!) 188/88         Pulse Readings from Last 3 Encounters:   09/14/20 81   09/10/20 71   02/13/20 79     @LASTSAO2(3)@

## 2020-09-25 PROBLEM — K62.3 COMPLETE RECTAL PROLAPSE WITH DISPLACEMENT OF ANAL SPHINCTER: Status: ACTIVE | Noted: 2020-09-25

## 2020-09-25 PROBLEM — Z12.11 SCREENING FOR MALIGNANT NEOPLASM OF COLON: Status: ACTIVE | Noted: 2020-09-25

## 2020-11-17 PROBLEM — I10 HTN (HYPERTENSION): Status: ACTIVE | Noted: 2020-11-17

## 2020-11-17 PROBLEM — I10 HTN (HYPERTENSION): Status: RESOLVED | Noted: 2020-11-17 | Resolved: 2020-11-17

## 2020-11-17 RX ORDER — SODIUM CHLORIDE 9 MG/ML
100 INJECTION, SOLUTION INTRAVENOUS CONTINUOUS
Status: CANCELLED | OUTPATIENT
Start: 2020-11-17

## 2020-11-18 ENCOUNTER — HOSPITAL ENCOUNTER (OUTPATIENT)
Dept: GASTROENTEROLOGY | Facility: HOSPITAL | Age: 55
Setting detail: OUTPATIENT SURGERY
Discharge: HOME | End: 2020-11-18
Attending: COLON & RECTAL SURGERY

## 2021-03-12 ENCOUNTER — HOSPITAL ENCOUNTER (OUTPATIENT)
Dept: RADIOLOGY | Age: 56
Discharge: HOME | End: 2021-03-12
Payer: COMMERCIAL

## 2021-03-12 VITALS — WEIGHT: 180 LBS | BODY MASS INDEX: 33.13 KG/M2 | HEIGHT: 62 IN

## 2021-03-12 DIAGNOSIS — Z12.31 VISIT FOR SCREENING MAMMOGRAM: ICD-10-CM

## 2021-03-12 DIAGNOSIS — Z12.31 ENCOUNTER FOR SCREENING MAMMOGRAM FOR MALIGNANT NEOPLASM OF BREAST: ICD-10-CM

## 2021-03-12 PROCEDURE — 77067 SCR MAMMO BI INCL CAD: CPT

## 2021-03-12 PROCEDURE — 77063 BREAST TOMOSYNTHESIS BI: CPT

## 2021-04-20 ENCOUNTER — TELEPHONE (OUTPATIENT)
Dept: MAMMOGRAPHY | Facility: CLINIC | Age: 56
End: 2021-04-20

## 2021-05-03 ENCOUNTER — IMMUNIZATIONS (OUTPATIENT)
Dept: FAMILY MEDICINE CLINIC | Facility: HOSPITAL | Age: 56
End: 2021-05-03

## 2021-05-03 DIAGNOSIS — Z23 ENCOUNTER FOR IMMUNIZATION: Primary | ICD-10-CM

## 2021-05-03 PROCEDURE — 91300 SARS-COV-2 / COVID-19 MRNA VACCINE (PFIZER-BIONTECH) 30 MCG: CPT

## 2021-05-03 PROCEDURE — 0001A SARS-COV-2 / COVID-19 MRNA VACCINE (PFIZER-BIONTECH) 30 MCG: CPT

## 2021-05-23 ENCOUNTER — IMMUNIZATIONS (OUTPATIENT)
Dept: FAMILY MEDICINE CLINIC | Facility: HOSPITAL | Age: 56
End: 2021-05-23

## 2021-05-23 DIAGNOSIS — Z23 ENCOUNTER FOR IMMUNIZATION: Primary | ICD-10-CM

## 2021-05-23 PROCEDURE — 0002A SARS-COV-2 / COVID-19 MRNA VACCINE (PFIZER-BIONTECH) 30 MCG: CPT

## 2021-05-23 PROCEDURE — 91300 SARS-COV-2 / COVID-19 MRNA VACCINE (PFIZER-BIONTECH) 30 MCG: CPT

## 2021-06-11 ENCOUNTER — ANNUAL EXAM (OUTPATIENT)
Dept: OBGYN CLINIC | Facility: CLINIC | Age: 56
End: 2021-06-11
Payer: COMMERCIAL

## 2021-06-11 VITALS
SYSTOLIC BLOOD PRESSURE: 150 MMHG | BODY MASS INDEX: 32.02 KG/M2 | HEIGHT: 62 IN | WEIGHT: 174 LBS | DIASTOLIC BLOOD PRESSURE: 82 MMHG

## 2021-06-11 DIAGNOSIS — Z01.419 WOMEN'S ANNUAL ROUTINE GYNECOLOGICAL EXAMINATION: Primary | ICD-10-CM

## 2021-06-11 PROBLEM — K64.9 HEMORRHOIDS: Status: ACTIVE | Noted: 2017-03-07

## 2021-06-11 PROCEDURE — 87624 HPV HI-RISK TYP POOLED RSLT: CPT | Performed by: NURSE PRACTITIONER

## 2021-06-11 PROCEDURE — G0145 SCR C/V CYTO,THINLAYER,RESCR: HCPCS | Performed by: NURSE PRACTITIONER

## 2021-06-11 PROCEDURE — G0101 CA SCREEN;PELVIC/BREAST EXAM: HCPCS | Performed by: NURSE PRACTITIONER

## 2021-06-11 RX ORDER — AMLODIPINE BESYLATE 5 MG/1
TABLET ORAL
COMMUNITY
Start: 2021-03-16

## 2021-06-11 NOTE — PROGRESS NOTES
Subjective    HPI:     Marry Henry is a 60 y o  postmenopausal female  She is a  1 Para 1, with 1   She has not been sexually active for 5 years  She denies  and Gyn complaints  She has rectal prolapse  She is seeking a 2nd opinion regarding repairing  She feels safe at home  She states her depression/anxiety is stable with her medication  Medical, surgical and family history reviewed  History of right breast cancer in  - she has been in remission  Her dental care is not done - needs to make an appt  Gynecologic History    No LMP recorded  Patient is postmenopausal     Last Pap: 3/17/17  Results were: normal  Last mammogram: 3/12/21  Results were: asymmetry on the left noted  Needs to schedule follow-up imaging  Colonoscopy: needs to schedule a colonoscopy  Obstetric History    OB History    Para Term  AB Living   1 1 1 0 0 0   SAB TAB Ectopic Multiple Live Births   0 0 0 0 0      # Outcome Date GA Lbr Jair/2nd Weight Sex Delivery Anes PTL Lv   1 Term                The following portions of the patient's history were reviewed and updated as appropriate: allergies, current medications, past family history, past medical history, past social history, past surgical history and problem list     Review of Systems    Pertinent items are noted in HPI  Objective    Physical Exam  Constitutional:       Appearance: Normal appearance  She is well-developed  Genitourinary:      Pelvic exam was performed with patient in the lithotomy position  Vulva, inguinal canal, urethra, bladder, uterus, right adnexa and left adnexa normal       No posterior fourchette tenderness, injury, rash or lesion present  No signs of injury or lesions in the vagina  Vaginal atrophic mucosa present  No vaginal discharge, erythema, tenderness, bleeding or rugosity  Cervix is not parous        No cervical motion tenderness, discharge, friability, lesion, erythema, bleeding, polyp or nabothian cyst       Uterus is anteverted  No right or left adnexal mass present  Right adnexa not tender or full  Left adnexa not tender or full  Genitourinary Comments: The cervix is small  HENT:      Head: Normocephalic and atraumatic  Neck:      Musculoskeletal: Neck supple  Thyroid: No thyromegaly  Cardiovascular:      Rate and Rhythm: Normal rate and regular rhythm  Heart sounds: Normal heart sounds, S1 normal and S2 normal    Pulmonary:      Effort: Pulmonary effort is normal       Breath sounds: Normal breath sounds  Chest:      Breasts: Breasts are symmetrical          Right: Normal  No inverted nipple, mass, nipple discharge, skin change or tenderness  Left: Normal  No inverted nipple, mass, nipple discharge, skin change or tenderness  Abdominal:      General: Bowel sounds are normal  There is no distension  Palpations: Abdomen is soft  There is no mass  Tenderness: There is no abdominal tenderness  There is no guarding  Lymphadenopathy:      Cervical: No cervical adenopathy  Upper Body:      Right upper body: No supraclavicular or axillary adenopathy  Left upper body: No supraclavicular or axillary adenopathy  Neurological:      Mental Status: She is alert  Skin:     General: Skin is warm and dry  Findings: No rash  Psychiatric:         Attention and Perception: Attention and perception normal          Mood and Affect: Mood and affect normal          Speech: Speech normal          Behavior: Behavior is cooperative  Thought Content: Thought content normal          Cognition and Memory: Cognition and memory normal          Judgment: Judgment normal    Vitals signs and nursing note reviewed            Assessment and Plan    Vanessa was seen today for gynecologic exam     Diagnoses and all orders for this visit:    Women's annual routine gynecological examination  -     Liquid-based pap, screening      Patient informed of a Stable GYN exam  A pap smear was performed  I have discussed the importance of exercise and healthy diet as well as adequate intake of calcium and vitamin D  The current ASCCP guidelines were reviewed  The low risk patient will receive pap smear screening every 3 years until the age of 34 and then every 3 to 5 years with HPV co-testing from the ages of 33-67  I emphasized the importance of an annual pelvic and breast exam  Mammograms are up-to-date  Results will be released to [facility], if abnormal will call to review and discuss treatment plan  All questions have been answered to her satisfaction  Follow up in: 1 year

## 2021-06-15 LAB
HPV HR 12 DNA CVX QL NAA+PROBE: NEGATIVE
HPV16 DNA CVX QL NAA+PROBE: NEGATIVE
HPV18 DNA CVX QL NAA+PROBE: NEGATIVE
LAB AP GYN PRIMARY INTERPRETATION: NORMAL
Lab: NORMAL

## 2021-08-25 ENCOUNTER — HOSPITAL ENCOUNTER (EMERGENCY)
Facility: HOSPITAL | Age: 56
Discharge: HOME | End: 2021-08-25
Attending: EMERGENCY MEDICINE | Admitting: EMERGENCY MEDICINE
Payer: COMMERCIAL

## 2021-08-25 VITALS
HEART RATE: 82 BPM | TEMPERATURE: 97.6 F | WEIGHT: 178 LBS | OXYGEN SATURATION: 96 % | BODY MASS INDEX: 32.76 KG/M2 | SYSTOLIC BLOOD PRESSURE: 172 MMHG | HEIGHT: 62 IN | RESPIRATION RATE: 18 BRPM | DIASTOLIC BLOOD PRESSURE: 81 MMHG

## 2021-08-25 DIAGNOSIS — R30.0 DYSURIA: ICD-10-CM

## 2021-08-25 DIAGNOSIS — N39.0 UTI (URINARY TRACT INFECTION): Primary | ICD-10-CM

## 2021-08-25 LAB
BACTERIA UR QL AUTO: ABNORMAL /HPF
BILIRUB UR QL STRIP: NEGATIVE
CLARITY UR: ABNORMAL
CLARITY, POC: NORMAL
COLOR UR: YELLOW
COLOR, POC: YELLOW
GLUCOSE UR STRIP-MCNC: NEGATIVE MG/DL
HGB UR QL STRIP.AUTO: ABNORMAL
HYALINE CASTS #/AREA URNS LPF: ABNORMAL /LPF
KETONES UR STRIP-MCNC: NEGATIVE MG/DL
LEUKOCYTE ESTERASE UR QL STRIP: ABNORMAL
NITRITE UR QL STRIP: NEGATIVE
NON-SQ EPI CELLS URNS QL MICRO: ABNORMAL /HPF
PH UR STRIP.AUTO: 5.5 [PH] (ref 4.5–8)
PROT UR STRIP-MCNC: ABNORMAL MG/DL
RBC #/AREA URNS AUTO: ABNORMAL /HPF
SP GR UR STRIP.AUTO: 1.02 (ref 1–1.03)
UROBILINOGEN UR QL STRIP.AUTO: 0.2 E.U./DL
WBC #/AREA URNS AUTO: ABNORMAL /HPF

## 2021-08-25 PROCEDURE — 87086 URINE CULTURE/COLONY COUNT: CPT

## 2021-08-25 PROCEDURE — 87186 SC STD MICRODIL/AGAR DIL: CPT

## 2021-08-25 PROCEDURE — 99284 EMERGENCY DEPT VISIT MOD MDM: CPT | Performed by: EMERGENCY MEDICINE

## 2021-08-25 PROCEDURE — 87077 CULTURE AEROBIC IDENTIFY: CPT

## 2021-08-25 PROCEDURE — 99283 EMERGENCY DEPT VISIT LOW MDM: CPT

## 2021-08-25 PROCEDURE — 81001 URINALYSIS AUTO W/SCOPE: CPT

## 2021-08-25 RX ORDER — CEPHALEXIN 500 MG/1
500 CAPSULE ORAL EVERY 8 HOURS SCHEDULED
Qty: 15 CAPSULE | Refills: 0 | Status: SHIPPED | OUTPATIENT
Start: 2021-08-25 | End: 2021-08-30

## 2021-08-25 RX ORDER — PHENAZOPYRIDINE HYDROCHLORIDE 200 MG/1
200 TABLET, FILM COATED ORAL 3 TIMES DAILY
Qty: 6 TABLET | Refills: 0 | Status: SHIPPED | OUTPATIENT
Start: 2021-08-25

## 2021-08-25 RX ORDER — IBUPROFEN 600 MG/1
600 TABLET ORAL EVERY 6 HOURS PRN
Qty: 30 TABLET | Refills: 0 | Status: SHIPPED | OUTPATIENT
Start: 2021-08-25

## 2021-08-25 NOTE — ED ATTENDING ATTESTATION
8/25/2021  ITeddy MD, saw and evaluated the patient  I have discussed the patient with the resident/non-physician practitioner and agree with the resident's/non-physician practitioner's findings, Plan of Care, and MDM as documented in the resident's/non-physician practitioner's note, except where noted  All available labs and Radiology studies were reviewed  I was present for key portions of any procedure(s) performed by the resident/non-physician practitioner and I was immediately available to provide assistance  At this point I agree with the current assessment done in the Emergency Department  I have conducted an independent evaluation of this patient a history and physical is as follows:    ED Course         Critical Care Time  Procedures    59 yo female with dysuria, incomplete emptying for few days  Pt with hx of uti  Pt with no abdominal or flank pain  No fever  No n/v/d  Pt with hx of asthma, htn  Vss, afebrile, lungs cta, rrr, abdomen soft nontender, no cva tenderness  Urine, treat for uti

## 2021-08-25 NOTE — ED PROVIDER NOTES
History  Chief Complaint   Patient presents with    Painful Urination     reports pain with urination and frequency     61 y/o female with history HTN and breast cancer s/p mastectomy presents to the ER for evaluation of dysuria, urinary urgency, and urinary frequency x 3-4 days  Her symptoms feel similar to previous UTI  She also describes burning discomfort with urination and sensation of incomplete bladder emptying  No fever, chills, cough, shortness of breath, chest pain, abdominal pain, nausea, vomiting, diarrhea, flank pain, vaginal bleeding, or vaginal discharge  No recent antibiotic use  She is postmenopausal x 7 years  Prior to Admission Medications   Prescriptions Last Dose Informant Patient Reported? Taking? albuterol (2 5 mg/3 mL) 0 083 % nebulizer solution   No No   Sig: Take 1 vial (2 5 mg total) by nebulization every 6 (six) hours as needed for wheezing or shortness of breath for up to 25 doses   albuterol (ACCUNEB) 1 25 MG/3ML nebulizer solution   No No   Sig: Take 3 mL by nebulization every 6 (six) hours as needed for wheezing   albuterol (PROVENTIL HFA,VENTOLIN HFA) 90 mcg/act inhaler   No No   Sig: Inhale 2 puffs every 6 (six) hours as needed for wheezing   amLODIPine (NORVASC) 5 mg tablet   Yes No   anastrozole (ARIMIDEX) 1 mg tablet   No No   Sig: TAKE 1 TABLET BY MOUTH DAILY   aspirin (ECOTRIN LOW STRENGTH) 81 mg EC tablet   Yes No   Sig: Take 81 mg by mouth daily   buPROPion (WELLBUTRIN) 75 mg tablet   Yes No   Sig: Take 75 mg by mouth once   budesonide-formoterol (SYMBICORT) 160-4 5 mcg/act inhaler   Yes No   Sig: Inhale 2 puffs 2 (two) times a day Rinse mouth after use     busPIRone (BUSPAR) 10 mg tablet   Yes No   Sig: Take 10 mg by mouth 2 (two) times a day   cholecalciferol (VITAMIN D3) 1,000 units tablet   Yes No   Sig: Take 1,000 Units by mouth daily   hydrochlorothiazide (HYDRODIURIL) 25 mg tablet  Self Yes No   Sig: Take 25 mg by mouth daily   hydrocortisone (ANUSOL-HC) 2 5 % rectal cream   No No   Sig: Apply rectally 2 times daily   Patient not taking: Reported on 6/11/2021   hydrocortisone (ANUSOL-HC) 2 5 % rectal cream   No No   Sig: Apply rectally 2 times daily   Patient not taking: Reported on 6/11/2021   hydrocortisone (ANUSOL-HC) 2 5 % rectal cream   No No   Sig: Apply topically 2 (two) times a day   Patient not taking: Reported on 6/11/2021   ipratropium (ATROVENT) 0 02 % nebulizer solution   No No   Sig: Take 1 vial (0 5 mg total) by nebulization 4 (four) times a day   naproxen (EC NAPROSYN) 500 MG EC tablet   No No   Sig: Take 1 tablet (500 mg total) by mouth 2 (two) times a day with meals   Patient not taking: Reported on 6/11/2021   omeprazole (PriLOSEC) 20 mg delayed release capsule   Yes No   Sig: Take 20 mg by mouth daily   polyethylene glycol (MIRALAX) 17 g packet   No No   Sig: Take 17 g by mouth daily for 28 days   Patient not taking: Reported on 6/11/2021      Facility-Administered Medications: None       Past Medical History:   Diagnosis Date    Asthma     [facility]     [facility]     breast cancer    History of chemotherapy     Hypertension     Psychiatric disorder     depression and anxiety       Past Surgical History:   Procedure Laterality Date    MASTECTOMY  2014    Right side       Family History   Problem Relation Age of Onset    No Known Problems Mother     No Known Problems Father     No Known Problems Daughter     No Known Problems Maternal Grandmother     No Known Problems Maternal Grandfather     No Known Problems Paternal Grandmother     No Known Problems Paternal Grandfather     Colon cancer Neg Hx      I have reviewed and agree with the history as documented      E-Cigarette/Vaping    E-Cigarette Use Never User      E-Cigarette/Vaping Substances    Nicotine No     THC No     CBD No     Flavoring No     Other No      Social History     Tobacco Use    Smoking status: Current Some Day Smoker     Packs/day: 0 00     Types: Cigarettes     Last attempt to quit: 2020     Years since quittin 5    Smokeless tobacco: Never Used   Vaping Use    Vaping Use: Never used   Substance Use Topics    Alcohol use: No    Drug use: No        Review of Systems   Constitutional: Negative for chills and fever  HENT: Negative for congestion, rhinorrhea and sore throat  Respiratory: Negative for cough and shortness of breath  Cardiovascular: Negative for chest pain and palpitations  Gastrointestinal: Negative for abdominal pain, diarrhea, nausea and vomiting  Genitourinary: Positive for dysuria and frequency  Negative for flank pain, hematuria, vaginal bleeding and vaginal discharge  Musculoskeletal: Negative for back pain and neck pain  Neurological: Negative for dizziness, weakness, light-headedness, numbness and headaches  All other systems reviewed and are negative  Physical Exam  ED Triage Vitals [21 1010]   Temperature Pulse Respirations Blood Pressure SpO2   97 6 °F (36 4 °C) 82 18 (!) 172/81 96 %      Temp Source Heart Rate Source Patient Position - Orthostatic VS BP Location FiO2 (%)   Tympanic Monitor Sitting Right arm --      Pain Score       Worst Possible Pain             Orthostatic Vital Signs  Vitals:    21 1010   BP: (!) 172/81   Pulse: 82   Patient Position - Orthostatic VS: Sitting       Physical Exam  Vitals and nursing note reviewed  Constitutional:       General: She is not in acute distress  Appearance: Normal appearance  She is normal weight  She is not ill-appearing  HENT:      Head: Normocephalic and atraumatic  Right Ear: External ear normal       Left Ear: External ear normal       Nose: Nose normal  No congestion or rhinorrhea  Mouth/Throat:      Mouth: Mucous membranes are moist       Pharynx: Oropharynx is clear  No oropharyngeal exudate or posterior oropharyngeal erythema  Eyes:      Extraocular Movements: Extraocular movements intact  Conjunctiva/sclera: Conjunctivae normal       Pupils: Pupils are equal, round, and reactive to light  Cardiovascular:      Rate and Rhythm: Normal rate and regular rhythm  Pulses: Normal pulses  Heart sounds: Normal heart sounds  No murmur heard  Pulmonary:      Effort: Pulmonary effort is normal  No respiratory distress  Breath sounds: Normal breath sounds  No wheezing or rales  Abdominal:      General: Abdomen is flat  Bowel sounds are normal  There is no distension  Palpations: Abdomen is soft  Tenderness: There is no abdominal tenderness  There is no right CVA tenderness, left CVA tenderness or guarding  Musculoskeletal:         General: No swelling or tenderness  Normal range of motion  Cervical back: Normal range of motion and neck supple  No tenderness  Skin:     General: Skin is warm and dry  Capillary Refill: Capillary refill takes less than 2 seconds  Neurological:      General: No focal deficit present  Mental Status: She is alert and oriented to person, place, and time  ED Medications  Medications - No data to display    Diagnostic Studies  Results Reviewed     Procedure Component Value Units Date/Time    Urine Microscopic [[id number]]  (Abnormal) Collected: 08/25/21 1014    Lab Status: Final result Specimen: Urine, Clean Catch Updated: 08/25/21 1023     RBC, UA Innumerable /hpf      WBC, UA Innumerable /hpf      Epithelial Cells None Seen /hpf      Bacteria, UA Occasional /hpf      Hyaline Casts, UA None Seen /lpf     Urine culture [[id number]] Collected: 08/25/21 1014    Lab Status:  In process Specimen: Urine, Clean Catch Updated: 08/25/21 1023    POCT urinalysis dipstick [[id number]]  (Normal) Resulted: 08/25/21 1022    Lab Status: Final result Updated: 08/25/21 1022     Color, UA yellow     Clarity, UA slightly cloudy    Urine culture [[id number]]     Lab Status: No result Specimen: Urine     Urine Macroscopic, POC [[id number]]  (Abnormal) Collected: 08/25/21 1014    Lab Status: Final result Specimen: Urine Updated: 08/25/21 1016     Color, UA Yellow     Clarity, UA Slightly Cloudy     pH, UA 5 5     Leukocytes, UA Moderate     Nitrite, UA Negative     Protein,  (2+) mg/dl      Glucose, UA Negative mg/dl      Ketones, UA Negative mg/dl      Urobilinogen, UA 0 2 E U /dl      Bilirubin, UA Negative     Blood, UA Large     Specific Gravity, UA 1 025    Narrative:      CLINITEK RESULT                 No orders to display         Procedures  Procedures      ED Course                                       MDM  Number of Diagnoses or Management Options  Dysuria  UTI (urinary tract infection)  Diagnosis management comments: 61 y/o female with history HTN and breast cancer s/p mastectomy presents to the ER for evaluation of dysuria, urinary urgency, and urinary frequency x 3-4 days  Her symptoms feel similar to previous UTI  She also describes burning discomfort with urination and sensation of incomplete bladder emptying  No fever, chills, cough, shortness of breath, chest pain, abdominal pain, nausea, vomiting, diarrhea, flank pain, vaginal bleeding, or vaginal discharge  No recent antibiotic use  She is postmenopausal x 7 years  Afebrile, VSS, no acute distress  Heart regular rate and rhythm, lungs clear auscultation bilaterally, abdomen soft and nontender  No CVA tenderness to palpation  Remainder of examination is noncontributory  Symptoms suspicious for urinary tract infection  Will check urinalysis  UA with signs of infection  Will treat with course of Keflex  Will also provide prescription for Motrin and peridium for discomfort  Discussed all findings, treatment, red flags/return precautions, and outpatient follow-up and the patient/family understands and agrees  Stable for discharge        Disposition  Final diagnoses:   UTI (urinary tract infection)   Dysuria     Time reflects when diagnosis was documented in both MDM as applicable and the Disposition within this note     Time User Action Codes Description Comment    8/25/2021 10:22 AM Yamila Bue Add [N39 0] UTI (urinary tract infection)     8/25/2021 10:24 AM Yamila Bue Add [R30 0] Dysuria       ED Disposition     ED Disposition Condition Date/Time Comment    Discharge Stable Wed Aug 25, 2021 10:22 AM Bradley Christinedorothy discharge to home/self care              Follow-up Information     Follow up With Specialties Details Why Contact Info Additional YULI Gan Nurse Practitioner Call in 1 day For follow-up [address]  [place] [address] [id number]       [facility] 34 [facility] Emergency Department Emergency Medicine Go to  If symptoms worsen [address]  958 [facility] 64 [facility] Emergency Department, [address], [facility] 108          Discharge Medication List as of 8/25/2021 10:24 AM      START taking these medications    Details   cephalexin (KEFLEX) 500 mg capsule Take 1 capsule (500 mg total) by mouth every 8 (eight) hours for 5 days, Starting Wed 8/25/2021, Until Mon 8/30/2021, Normal      ibuprofen (MOTRIN) 600 mg tablet Take 1 tablet (600 mg total) by mouth every 6 (six) hours as needed for mild pain, Starting Wed 8/25/2021, Normal      phenazopyridine (PYRIDIUM) 200 mg tablet Take 1 tablet (200 mg total) by mouth 3 (three) times a day, Starting Wed 8/25/2021, Normal         CONTINUE these medications which have NOT CHANGED    Details   albuterol (2 5 mg/3 mL) 0 083 % nebulizer solution Take 1 vial (2 5 mg total) by nebulization every 6 (six) hours as needed for wheezing or shortness of breath for up to 25 doses, Starting Mon 2/3/2020, Print      albuterol (ACCUNEB) 1 25 MG/3ML nebulizer solution Take 3 mL by nebulization every 6 (six) hours as needed for wheezing, Starting Mon 1/22/2018, Print      albuterol (PROVENTIL HFA,VENTOLIN HFA) 90 mcg/act inhaler Inhale 2 puffs every 6 (six) hours as needed for wheezing, Starting Mon 1/22/2018, Print      amLODIPine (NORVASC) 5 mg tablet Starting Tue 3/16/2021, Historical Med      anastrozole (ARIMIDEX) 1 mg tablet TAKE 1 TABLET BY MOUTH DAILY, Normal      aspirin (ECOTRIN LOW STRENGTH) 81 mg EC tablet Take 81 mg by mouth daily, Historical Med      budesonide-formoterol (SYMBICORT) 160-4 5 mcg/act inhaler Inhale 2 puffs 2 (two) times a day Rinse mouth after use , Historical Med      buPROPion (WELLBUTRIN) 75 mg tablet Take 75 mg by mouth once, Historical Med      busPIRone (BUSPAR) 10 mg tablet Take 10 mg by mouth 2 (two) times a day, Historical Med      cholecalciferol (VITAMIN D3) 1,000 units tablet Take 1,000 Units by mouth daily, Historical Med      hydrochlorothiazide (HYDRODIURIL) 25 mg tablet Take 25 mg by mouth daily, Historical Med      !! hydrocortisone (ANUSOL-HC) 2 5 % rectal cream Apply rectally 2 times daily, Print      !! hydrocortisone (ANUSOL-HC) 2 5 % rectal cream Apply rectally 2 times daily, Print      hydrocortisone (ANUSOL-HC) 2 5 % rectal cream Apply topically 2 (two) times a day, Starting Thu 9/10/2020, Normal      ipratropium (ATROVENT) 0 02 % nebulizer solution Take 1 vial (0 5 mg total) by nebulization 4 (four) times a day, Starting Mon 2/3/2020, Print      naproxen (EC NAPROSYN) 500 MG EC tablet Take 1 tablet (500 mg total) by mouth 2 (two) times a day with meals, Starting Mon 2/4/2019, Until Tue 2/4/2020, Print      omeprazole (PriLOSEC) 20 mg delayed release capsule Take 20 mg by mouth daily, Historical Med      polyethylene glycol (MIRALAX) 17 g packet Take 17 g by mouth daily for 28 days, Starting Thu 2/13/2020, Until Thu 3/12/2020, Print       !! - Potential duplicate medications found  Please discuss with provider  No discharge procedures on file  PDMP Review     None           ED Provider  Attending physically available and evaluated Yang Stephenson I managed the patient along with the ED Attending      Electronically Signed by         Jarrett Mcguire MD  09/06/21 [id number]

## 2021-08-27 LAB
BACTERIA UR CULT: ABNORMAL
BACTERIA UR CULT: ABNORMAL

## 2021-09-20 ENCOUNTER — OFFICE VISIT (OUTPATIENT)
Dept: HEMATOLOGY ONCOLOGY | Facility: CLINIC | Age: 56
End: 2021-09-20
Payer: COMMERCIAL

## 2021-09-20 VITALS
BODY MASS INDEX: 32.54 KG/M2 | DIASTOLIC BLOOD PRESSURE: 72 MMHG | RESPIRATION RATE: 18 BRPM | HEART RATE: 75 BPM | OXYGEN SATURATION: 97 % | TEMPERATURE: 96.1 F | SYSTOLIC BLOOD PRESSURE: 138 MMHG | HEIGHT: 62 IN | WEIGHT: 176.8 LBS

## 2021-09-20 DIAGNOSIS — Z17.0 MALIGNANT NEOPLASM OF RIGHT BREAST IN FEMALE, ESTROGEN RECEPTOR POSITIVE, UNSPECIFIED SITE OF BREAST (HCC): Primary | ICD-10-CM

## 2021-09-20 DIAGNOSIS — C50.911 MALIGNANT NEOPLASM OF RIGHT BREAST IN FEMALE, ESTROGEN RECEPTOR POSITIVE, UNSPECIFIED SITE OF BREAST (HCC): Primary | ICD-10-CM

## 2021-09-20 PROCEDURE — 99214 OFFICE O/P EST MOD 30 MIN: CPT | Performed by: INTERNAL MEDICINE

## 2021-09-20 RX ORDER — ANASTROZOLE 1 MG/1
1 TABLET ORAL DAILY
Qty: 90 TABLET | Refills: 3 | Status: SHIPPED | OUTPATIENT
Start: 2021-09-20

## 2021-09-20 NOTE — PROGRESS NOTES
Hematology / Oncology Outpatient Follow Up Note    Asmita Desai 60 y o  female :1965 BDI:[id number]         Date:  2021    Assessment / Plan:  A 57 year old postmenopausal woman with stage IIB right breast cancer, grade 3, ER/NJ positive, HER-2 negative disease  She had one positive lymph node  She underwent mastectomy with axillary lymph node dissection, without reconstruction resulting in the EDIS  She has very high Oncotype DX recurrence score was 61  She completed adjuvant chemotherapy with AC followed by paclitaxel in 2014, followed by adjuvant hormonal therapy with tamoxifen for 5 years until 2019  She is currently on adjuvant hormonal therapy with anastrozole with no significant toxicity  She has no evidence recurrent disease, based on her symptoms and physical examination  I recommended her to continue with anastrozole 1 mg once a day  She had recent mammography on the left which was incomplete  However, she has not had diagnostic mammography or ultrasound yet which was recommended by the radiologist due to the lack of transportation as well as the fact that she was hard to be reached  Surgical oncology ordered these imaging studies  We will set up the appointment for the diagnostic ultrasound and mammography on the left as well as transportation to [facility]  I recommended her to continue with anastrozole 1 mg once a day for 3 more years  She is in agreement with my recommendation  I will see her again in a year for routine follow-up        Subjective:      HPI:  A 46year old perimenopausal woman, who was found to have matted right breast  She underwent ultrasound-guided biopsy in 2013 which showed invasive ductal carcinoma  She was referred to Dr Susana Fitzpatrick  She underwent mastectomy without reconstruction as well as right axillary lymph node dissection in 2013   Mastectomy specimen showed 3 8 cm invasive ductal carcinoma with lymphovascular invasion  this was grade 3  One out of 51, axillary lymph node positive for metastatic disease with 1 cm of tumor deposit  Previously, she underwent CT scan of chest, abdomen, pelvis, which showed no evidence of metastatic disease  She presents today to discuss adjuvant treatment options  She has several comorbidities including bipolar disorder, depression, and anxiety  She has a history of stroke 3 times  However, she does not appear to have any residual neurological deficit  She has no complaint of pain  She is to have mild limitation of range of motion of right shoulder after the surgery  she has no weight lossor any respiratory symptoms  Her performance status is normal  Her last menstrual cycle was 2 months ago  She has some hot flashes and some sweating             Interval History:  A 60year-old postmenopausal woman with stage IIB right breast cancer  She had approximately 3 8 cm invasive ductal carcinoma, grade 3  This was ER/SD positive, HER-2 negative disease  She had 1out of 51 positive axillary lymph node with tumor deposit 1 cm  she underwent mastectomy with axillary lymph node dissection resulting in EDIS, followed by adjuvant chemotherapy  Her Oncotype DX score was 61, which is high risk  She was treated with adjuvant chemotherapy with dose dense AC followed by paclitaxel, which was completed in June 2014  She had regular menstrual cycles until February 2014  She was on adjuvant hormonal therapy with tamoxifen for 5 years until August 2019 since she was premenopausal   Since August 2019, she been on anastrozole  She came in today for routine follow-up  In March 2021, her left breast screening mammography was incomplete  They recommended diagnostic ultrasound as well as diagnostic mammography on the left  However, she has not had further imaging study because she was hard to be reached  She also has issue with transportation  She presents today for follow-up    She physically is doing well  She has no complaint of pain  She denied any respiratory symptoms  Her weight is stable  She has normal performance status         Objective:      Primary Diagnosis:     Right breast cancer  Stage IIB(pT2, pN1a, M0)  Grade 3  ER/AR positive, HER-2 negative disease  Oncotype DX recurrence score 61  Diagnosed in December 2013       Cancer Staging:  Cancer Staging  No matching staging information was found for the patient         Previous Hematologic/ Oncologic Treatment:      1  Right mastectomy and axillary lymph node dissection  2  adjuvant chemotherapy with dose dense AC X4 followed by weekly paclitaxel x12  Completed in June 2014   3  Adjuvant hormonal therapy with tamoxifen from August 2014 through August 2019       Current Hematologic/ Oncologic Treatment:       Adjuvant hormonal therapy with anastrozole since August 2019       Disease Status:      EIDS status post mastectomy with axillary lymph node dissection       Test Results:     Pathology:     3 8 cm invasive ductal carcinoma with lymphovascular invasion  Grade 3  One out of 51, axillary lymph node positive for metastatic disease with 1 cm of tumor deposit  ER/AR positive, HER-2 negative disease  Stage IIB (pT2, pN1a, M0)  Oncotype DX recurrence score, 61       Radiology:     Mammography in   March 2021 was incomplete  BI-RADS 0      Laboratory:           Physical Exam:        General Appearance:    Alert, oriented          Eyes:    PERRL   Ears:    Normal external ear canals, both ears   Nose:   Nares normal, septum midline   Throat:   Mucosa moist  Pharynx without injection  Neck:   Supple         Lungs:     Clear to auscultation bilaterally   Chest Wall:    No tenderness or deformity    Heart:    Regular rate and rhythm         Abdomen:     Soft, non-tender, bowel sounds +, no organomegaly               Extremities:   Extremities no cyanosis or edema         Skin:   no rash or icterus      Lymph nodes:   Cervical, supraclavicular, and axillary nodes normal   Neurologic:   CNII-XII intact, normal strength, sensation and reflexes     Throughout             Breast exam:   status post right mastectomy without reconstruction  No palpable abnormalities in the right chest wall  Left breast exam is negative              ROS: Review of Systems   All other systems reviewed and are negative  Imaging: No results found  Labs:   Lab Results   Component Value Date    WBC 7 67 09/10/2020    HGB 10 5 (L) 09/10/2020    HCT 32 3 (L) 09/10/2020    MCV 89 09/10/2020     09/10/2020     Lab Results   Component Value Date     07/30/2014    K 3 3 (L) 09/10/2020     09/10/2020    CO2 27 09/10/2020    ANIONGAP 8 07/30/2014    BUN 22 09/10/2020    CREATININE 1 21 09/10/2020    GLUCOSE 102 07/30/2014    GLUF 78 10/01/2018    GLUF 78 10/01/2018    CALCIUM 9 3 09/10/2020    AST 36 09/10/2020    ALT 39 09/10/2020    ALKPHOS 122 (H) 09/10/2020    PROT 6 7 07/30/2014    BILITOT 0 18 (L) 07/30/2014    EGFR 50 09/10/2020         Lab Results   Component Value Date    IRON 62 10/01/2018    TIBC 405 10/01/2018    FERRITIN 111 10/01/2018       Lab Results   Component Value Date    TXVYWAUS81 522 10/01/2018       Lab Results   Component Value Date    FOLATE >20 0 (H) 10/01/2018         Current Medications: Reviewed  Allergies: Reviewed  PMH/FH/SH:  Reviewed      Vital Sign:    Body surface area is 1 81 meters squared      Wt Readings from Last 3 Encounters:   09/20/21 80 2 kg (176 lb 12 8 oz)   08/25/21 80 7 kg (178 lb)   08/15/21 78 9 kg (174 lb)        Temp Readings from Last 3 Encounters:   09/20/21 (!) 96 1 °F (35 6 °C) (Tympanic Core)   08/25/21 97 6 °F (36 4 °C) (Tympanic)   08/15/21 (!) 97 2 °F (36 2 °C) (Tympanic)        BP Readings from Last 3 Encounters:   09/20/21 138/72   08/25/21 (!) 172/81   08/15/21 157/87         Pulse Readings from Last 3 Encounters:   09/20/21 75   08/25/21 82   08/15/21 72     @LASTSAO2(3)@

## 2022-07-25 ENCOUNTER — TRANSCRIBE ORDERS (OUTPATIENT)
Dept: LAB | Facility: HOSPITAL | Age: 57
End: 2022-07-25

## 2022-07-25 DIAGNOSIS — Z00.01 ENCOUNTER FOR GENERAL ADULT MEDICAL EXAMINATION WITH ABNORMAL FINDINGS: Primary | ICD-10-CM

## 2022-07-29 ENCOUNTER — TELEPHONE (OUTPATIENT)
Dept: HEMATOLOGY ONCOLOGY | Facility: CLINIC | Age: 57
End: 2022-07-29

## 2022-07-29 NOTE — TELEPHONE ENCOUNTER
Patient calling regarding transportation for her appointment on 08/31 @ 10:30AM at [facility] in [facility]    I called [facility] and arranged transportation for her  She will be picked up at her sisters house per her request at [place] [id number]     I relayed to patient that transportation was arranged  Patient verbalized understanding

## 2022-08-10 ENCOUNTER — TELEPHONE (OUTPATIENT)
Dept: HEMATOLOGY ONCOLOGY | Facility: CLINIC | Age: 57
End: 2022-08-10

## 2022-08-10 NOTE — TELEPHONE ENCOUNTER
Called and left 2 messages for Vanessa regarding her follow up appointment with Dr Darwin Jean  He will not be in the office on 9/16 and her appointment needs to be moved  I asked that she call back to reschedule  She wasn't automatically given a new time or date since we need to confirm if she uses [facility]

## 2022-08-17 ENCOUNTER — TELEPHONE (OUTPATIENT)
Dept: HEMATOLOGY ONCOLOGY | Facility: CLINIC | Age: 57
End: 2022-08-17

## 2022-08-17 NOTE — TELEPHONE ENCOUNTER
Appointment Confirmation (to confirm pre existing appointments - ONLY)  No need to route   Appointment with  Dr Jahaira Najera   Appointment date & time 8/31/22 10:30am   Location [place]   Patient verbilized Understanding  Yes

## 2022-08-31 ENCOUNTER — HOSPITAL ENCOUNTER (OUTPATIENT)
Dept: ULTRASOUND IMAGING | Facility: CLINIC | Age: 57
Discharge: HOME | End: 2022-08-31
Payer: MEDICARE

## 2022-08-31 ENCOUNTER — HOSPITAL ENCOUNTER (OUTPATIENT)
Dept: MAMMOGRAPHY | Facility: CLINIC | Age: 57
Discharge: HOME | End: 2022-08-31
Payer: MEDICARE

## 2022-08-31 ENCOUNTER — TELEPHONE (OUTPATIENT)
Dept: HEMATOLOGY ONCOLOGY | Facility: CLINIC | Age: 57
End: 2022-08-31

## 2022-08-31 DIAGNOSIS — R92.8 ABNORMAL MAMMOGRAM: ICD-10-CM

## 2022-08-31 DIAGNOSIS — Z12.31 ENCOUNTER FOR SCREENING MAMMOGRAM FOR MALIGNANT NEOPLASM OF BREAST: ICD-10-CM

## 2022-08-31 DIAGNOSIS — N64.4 BREAST PAIN: ICD-10-CM

## 2022-08-31 PROCEDURE — 76642 ULTRASOUND BREAST LIMITED: CPT

## 2022-08-31 PROCEDURE — 77065 DX MAMMO INCL CAD UNI: CPT

## 2022-08-31 PROCEDURE — G0279 TOMOSYNTHESIS, MAMMO: HCPCS

## 2022-08-31 NOTE — TELEPHONE ENCOUNTER
Patient calling to confirm when [facility] transport will be coming to pick her up from her appointment at [facility]  Called [facility] and confirmed they were coming - they confirmed the  was dropping off another patient and would be there in 20 minutes or so  Relayed this to patient  Patient understood

## 2022-09-07 ENCOUNTER — TELEPHONE (OUTPATIENT)
Dept: HEMATOLOGY ONCOLOGY | Facility: CLINIC | Age: 57
End: 2022-09-07

## 2022-09-07 NOTE — TELEPHONE ENCOUNTER
Appointment Cancellation Or Reschedule     Person calling in Patient    Provider Dr Caty Pritchard   Office Visit Date and Time 09/30 at 8:20am    Office Visit Location [facility]   Did patient want to reschedule their office appointment? If so, when was it scheduled to? Yes, 11/10 at 9:20am    Did you have [facility] scheduled for this appointment? Yes    Do you need [facility] set up for your new appointment? If yes, please send to "PATIENT RIDESHARE" pool for [facility] rescheduling yes   If you are cancelling appointment, can we notify [facility] to cancel ride? If yes, please send to "PATIENT RIDESHARE" pool for [facility] to cancel service Yes    Is this patient calling to reschedule an infusion appointment? no   When is their next infusion appointment? n/a   Is this patient a Chemo patient? no   Reason for Cancellation or Reschedule Appointment Conflict      If the patient is a treatment patient, please route this to the office nurse  If the patient is not on treatment, please route to the office MA  If the patient is a surgical oncology patient, please route to surg/onc clinical pool

## 2022-11-10 ENCOUNTER — OFFICE VISIT (OUTPATIENT)
Dept: HEMATOLOGY ONCOLOGY | Facility: CLINIC | Age: 57
End: 2022-11-10

## 2022-11-10 VITALS
OXYGEN SATURATION: 96 % | TEMPERATURE: 96.9 F | HEART RATE: 77 BPM | SYSTOLIC BLOOD PRESSURE: 126 MMHG | WEIGHT: 176 LBS | BODY MASS INDEX: 32.19 KG/M2 | DIASTOLIC BLOOD PRESSURE: 80 MMHG | RESPIRATION RATE: 18 BRPM

## 2022-11-10 DIAGNOSIS — C50.911 MALIGNANT NEOPLASM OF RIGHT BREAST IN FEMALE, ESTROGEN RECEPTOR POSITIVE, UNSPECIFIED SITE OF BREAST (HCC): Primary | ICD-10-CM

## 2022-11-10 DIAGNOSIS — Z17.0 MALIGNANT NEOPLASM OF RIGHT BREAST IN FEMALE, ESTROGEN RECEPTOR POSITIVE, UNSPECIFIED SITE OF BREAST (HCC): Primary | ICD-10-CM

## 2022-11-10 DIAGNOSIS — R30.0 DYSURIA: ICD-10-CM

## 2022-11-10 DIAGNOSIS — N39.0 UTI (URINARY TRACT INFECTION): ICD-10-CM

## 2022-11-10 RX ORDER — IBUPROFEN 600 MG/1
600 TABLET ORAL EVERY 8 HOURS PRN
Qty: 90 TABLET | Refills: 1 | Status: SHIPPED | OUTPATIENT
Start: 2022-11-10

## 2022-11-10 RX ORDER — ANASTROZOLE 1 MG/1
1 TABLET ORAL DAILY
Qty: 90 TABLET | Refills: 3 | Status: SHIPPED | OUTPATIENT
Start: 2022-11-10

## 2022-11-10 NOTE — PROGRESS NOTES
Hematology / Oncology Outpatient Follow Up Note    Tyshawn Crook 60 y o  female :1965 [id number]         Date:  11/10/2022    Assessment / Plan:  A 62 year old postmenopausal woman with stage IIB right breast cancer, grade 3, ER/DE positive, HER-2 negative disease  She had one positive lymph node  She underwent mastectomy with axillary lymph node dissection, without reconstruction resulting in the EDIS  She has very high Oncotype DX recurrence score was 61  She completed adjuvant chemotherapy with AC followed by paclitaxel in 2014, followed by adjuvant hormonal therapy with tamoxifen for 5 years until 2019  She is currently on adjuvant hormonal therapy with anastrozole with moderate musculoskeletal symptoms  I refilled ibuprofen  I recommended her to continue anastrozole 1 mg once a day for 2 more years to complete total 10 years of adjuvant hormonal therapy  She is in agreement with my recommendation  I will see her again in a year for routine follow-up        Subjective:      HPI:  A 46year old perimenopausal woman, who was found to have matted right breast  She underwent ultrasound-guided biopsy in 2013 which showed invasive ductal carcinoma  She was referred to Dr Chris Beard  She underwent mastectomy without reconstruction as well as right axillary lymph node dissection in 2013  Mastectomy specimen showed 3 8 cm invasive ductal carcinoma with lymphovascular invasion  this was grade 3  One out of 51, axillary lymph node positive for metastatic disease with 1 cm of tumor deposit  Previously, she underwent CT scan of chest, abdomen, pelvis, which showed no evidence of metastatic disease  She presents today to discuss adjuvant treatment options  She has several comorbidities including bipolar disorder, depression, and anxiety  She has a history of stroke 3 times  However, she does not appear to have any residual neurological deficit  She has no complaint of pain   She is to have mild limitation of range of motion of right shoulder after the surgery  she has no weight lossor any respiratory symptoms  Her performance status is normal  Her last menstrual cycle was 2 months ago  She has some hot flashes and some sweating             Interval History:  A 60year-old postmenopausal woman with stage IIB right breast cancer  She had approximately 3 8 cm invasive ductal carcinoma, grade 3  This was ER/KS positive, HER-2 negative disease  She had 1out of 51 positive axillary lymph node with tumor deposit 1 cm  she underwent mastectomy with axillary lymph node dissection resulting in EDIS, followed by adjuvant chemotherapy  Her Oncotype DX score was 61, which is high risk  She was treated with adjuvant chemotherapy with dose dense AC followed by paclitaxel, which was completed in June 2014  She had regular menstrual cycles until February 2014  She was on adjuvant hormonal therapy with tamoxifen for 5 years until August 2019 since she was premenopausal   Since August 2019, she been on anastrozole   She came in today for routine follow-up  She is tolerating anastrozole well except slowly progressive musculoskeletal symptoms  Especially she has stiffness and pain in her bilateral hands  She has minimal hot flashes  She denied any respiratory symptoms  Her weight is stable  Her mammography in August 2022 was benign  Her performance status is normal         Objective:      Primary Diagnosis:     Right breast cancer  Stage IIB(pT2, pN1a, M0)  Grade 3  ER/KS positive, HER-2 negative disease  Oncotype DX recurrence score 61  Diagnosed in December 2013       Cancer Staging:  Cancer Staging  No matching staging information was found for the patient         Previous Hematologic/ Oncologic Treatment:      1  Right mastectomy and axillary lymph node dissection  2  adjuvant chemotherapy with dose dense AC X4 followed by weekly paclitaxel x12   Completed in June 2014   3  Adjuvant hormonal therapy with tamoxifen from August 2014 through August 2019       Current Hematologic/ Oncologic Treatment:       Adjuvant hormonal therapy with anastrozole since August 2019       Disease Status:      EDIS status post mastectomy with axillary lymph node dissection       Test Results:     Pathology:     3 8 cm invasive ductal carcinoma with lymphovascular invasion  Grade 3  One out of 51, axillary lymph node positive for metastatic disease with 1 cm of tumor deposit  ER/SD positive, HER-2 negative disease  Stage IIB (pT2, pN1a, M0)  Oncotype DX recurrence score, 61       Radiology:     Mammography in August 2022 was benign  Bi rad 2      Laboratory:           Physical Exam:        General Appearance:    Alert, oriented          Eyes:    PERRL   Ears:    Normal external ear canals, both ears   Nose:   Nares normal, septum midline   Throat:   Mucosa moist  Pharynx without injection  Neck:   Supple         Lungs:     Clear to auscultation bilaterally   Chest Wall:    No tenderness or deformity    Heart:    Regular rate and rhythm         Abdomen:     Soft, non-tender, bowel sounds +, no organomegaly               Extremities:   Extremities no cyanosis or edema         Skin:   no rash or icterus  Lymph nodes:   Cervical, supraclavicular, and axillary nodes normal   Neurologic:   CNII-XII intact, normal strength, sensation and reflexes     Throughout             Breast exam:   status post right mastectomy without reconstruction  No palpable abnormalities in the right chest wall  Left breast exam is negative                ROS: Review of Systems   All other systems reviewed and are negative  Imaging: No results found        Labs:   Lab Results   Component Value Date    WBC 7 67 09/10/2020    HGB 10 5 (L) 09/10/2020    HCT 32 3 (L) 09/10/2020    MCV 89 09/10/2020     09/10/2020     Lab Results   Component Value Date     07/30/2014    K 3 3 (L) 09/10/2020     09/10/2020    CO2 27 09/10/2020 ANIONGAP 8 07/30/2014    BUN 22 09/10/2020    CREATININE 1 21 09/10/2020    GLUCOSE 102 07/30/2014    GLUF 78 10/01/2018    GLUF 78 10/01/2018    CALCIUM 9 3 09/10/2020    AST 36 09/10/2020    ALT 39 09/10/2020    ALKPHOS 122 (H) 09/10/2020    PROT 6 7 07/30/2014    BILITOT 0 18 (L) 07/30/2014    EGFR 50 09/10/2020         Lab Results   Component Value Date    IRON 62 10/01/2018    TIBC 405 10/01/2018    FERRITIN 111 10/01/2018       Lab Results   Component Value Date    HTLTZCFA99 984 10/01/2018       Lab Results   Component Value Date    FOLATE >20 0 (H) 10/01/2018         Current Medications: Reviewed  Allergies: Reviewed  PMH/FH/SH:  Reviewed      Vital Sign:    Body surface area is 1 81 meters squared      Wt Readings from Last 3 Encounters:   11/10/22 79 8 kg (176 lb)   09/20/21 80 2 kg (176 lb 12 8 oz)   08/25/21 80 7 kg (178 lb)        Temp Readings from Last 3 Encounters:   11/10/22 (!) 96 9 °F (36 1 °C) (Tympanic)   09/20/21 (!) 96 1 °F (35 6 °C) (Tympanic Core)   08/25/21 97 6 °F (36 4 °C) (Tympanic)        BP Readings from Last 3 Encounters:   11/10/22 126/80   09/20/21 138/72   08/25/21 (!) 172/81         Pulse Readings from Last 3 Encounters:   11/10/22 77   09/20/21 75   08/25/21 82     @LASTSAO2(3)@

## 2023-01-26 ENCOUNTER — TELEPHONE (OUTPATIENT)
Dept: GASTROENTEROLOGY | Facility: CLINIC | Age: 58
End: 2023-01-26

## 2023-01-26 NOTE — TELEPHONE ENCOUNTER
Scheduled date of colonoscopy (as of today):03/07/2023  Physician performing colonoscopy:Dr Orly Quintanilla  Location of colonoscopy: [address]  Desired bowel prep reviewed with patient:Miralax/Dolcolax  Instructions reviewed with patient by:john Gonzalez instructions 1/26/23  Clearances:  N/A no

## 2023-02-16 ENCOUNTER — APPOINTMENT (EMERGENCY)
Dept: RADIOLOGY | Facility: HOSPITAL | Age: 58
End: 2023-02-16

## 2023-02-16 ENCOUNTER — HOSPITAL ENCOUNTER (EMERGENCY)
Facility: HOSPITAL | Age: 58
End: 2023-02-16
Attending: EMERGENCY MEDICINE

## 2023-02-16 ENCOUNTER — HOSPITAL ENCOUNTER (INPATIENT)
Facility: HOSPITAL | Age: 58
LOS: 2 days | Discharge: HOME | End: 2023-02-18
Attending: EMERGENCY MEDICINE | Admitting: EMERGENCY MEDICINE

## 2023-02-16 VITALS
TEMPERATURE: 97.6 F | SYSTOLIC BLOOD PRESSURE: 155 MMHG | OXYGEN SATURATION: 97 % | HEART RATE: 60 BPM | DIASTOLIC BLOOD PRESSURE: 75 MMHG | RESPIRATION RATE: 16 BRPM

## 2023-02-16 DIAGNOSIS — F41.9 ANXIETY AND DEPRESSION: ICD-10-CM

## 2023-02-16 DIAGNOSIS — F11.20 OPIOID USE DISORDER, MODERATE, DEPENDENCE (HCC): ICD-10-CM

## 2023-02-16 DIAGNOSIS — G93.40 ACUTE ENCEPHALOPATHY: ICD-10-CM

## 2023-02-16 DIAGNOSIS — R77.8 ELEVATED TROPONIN: ICD-10-CM

## 2023-02-16 DIAGNOSIS — D72.829 LEUKOCYTOSIS: ICD-10-CM

## 2023-02-16 DIAGNOSIS — F11.20 OPIOID USE DISORDER, SEVERE, DEPENDENCE (HCC): ICD-10-CM

## 2023-02-16 DIAGNOSIS — I10 HYPERTENSION, UNSPECIFIED TYPE: ICD-10-CM

## 2023-02-16 DIAGNOSIS — F29 PSYCHOSIS (HCC): Primary | ICD-10-CM

## 2023-02-16 DIAGNOSIS — J45.20 MILD INTERMITTENT ASTHMA WITHOUT COMPLICATION: ICD-10-CM

## 2023-02-16 DIAGNOSIS — F11.90 OPIOID USE DISORDER: ICD-10-CM

## 2023-02-16 DIAGNOSIS — F32.A ANXIETY AND DEPRESSION: ICD-10-CM

## 2023-02-16 DIAGNOSIS — T40.2X1A OPIOID OVERDOSE (HCC): Primary | ICD-10-CM

## 2023-02-16 PROBLEM — E87.6 HYPOKALEMIA: Status: ACTIVE | Noted: 2023-02-16

## 2023-02-16 PROBLEM — R74.01 ELEVATED AST (SGOT): Status: ACTIVE | Noted: 2023-02-16

## 2023-02-16 PROBLEM — Z85.3 HISTORY OF CANCER OF RIGHT BREAST: Status: ACTIVE | Noted: 2018-11-30

## 2023-02-16 LAB
2HR DELTA HS TROPONIN: -3 NG/L
ALBUMIN SERPL BCP-MCNC: 3.7 G/DL (ref 3.5–5)
ALP SERPL-CCNC: 105 U/L (ref 46–116)
ALT SERPL W P-5'-P-CCNC: 58 U/L (ref 12–78)
ANION GAP SERPL CALCULATED.3IONS-SCNC: 4 MMOL/L (ref 4–13)
APAP SERPL-MCNC: <2 UG/ML (ref 10–20)
AST SERPL W P-5'-P-CCNC: 56 U/L (ref 5–45)
ATRIAL RATE: 67 BPM
BASE EX.OXY STD BLDV CALC-SCNC: 83.9 % (ref 60–80)
BASE EXCESS BLDV CALC-SCNC: 1.9 MMOL/L
BASOPHILS # BLD AUTO: 0.05 THOUSANDS/ÂΜL (ref 0–0.1)
BASOPHILS NFR BLD AUTO: 0 % (ref 0–1)
BILIRUB SERPL-MCNC: 0.15 MG/DL (ref 0.2–1)
BUN SERPL-MCNC: 22 MG/DL (ref 5–25)
CALCIUM SERPL-MCNC: 9.2 MG/DL (ref 8.3–10.1)
CARDIAC TROPONIN I PNL SERPL HS: 29 NG/L
CARDIAC TROPONIN I PNL SERPL HS: 32 NG/L
CHLORIDE SERPL-SCNC: 104 MMOL/L (ref 96–108)
CO2 SERPL-SCNC: 29 MMOL/L (ref 21–32)
CREAT SERPL-MCNC: 1.05 MG/DL (ref 0.6–1.3)
EOSINOPHIL # BLD AUTO: 0.02 THOUSAND/ÂΜL (ref 0–0.61)
EOSINOPHIL NFR BLD AUTO: 0 % (ref 0–6)
ERYTHROCYTE [DISTWIDTH] IN BLOOD BY AUTOMATED COUNT: 14.3 % (ref 11.6–15.1)
ETHANOL SERPL-MCNC: <3 MG/DL (ref 0–3)
GFR SERPL CREATININE-BSD FRML MDRD: 58 ML/MIN/1.73SQ M
GLUCOSE SERPL-MCNC: 198 MG/DL (ref 65–140)
HCO3 BLDV-SCNC: 28.8 MMOL/L (ref 24–30)
HCT VFR BLD AUTO: 36.7 % (ref 34.8–46.1)
HGB BLD-MCNC: 12.3 G/DL (ref 11.5–15.4)
IMM GRANULOCYTES # BLD AUTO: 0.11 THOUSAND/UL (ref 0–0.2)
IMM GRANULOCYTES NFR BLD AUTO: 1 % (ref 0–2)
LYMPHOCYTES # BLD AUTO: 1.65 THOUSANDS/ÂΜL (ref 0.6–4.47)
LYMPHOCYTES NFR BLD AUTO: 9 % (ref 14–44)
MCH RBC QN AUTO: 30.8 PG (ref 26.8–34.3)
MCHC RBC AUTO-ENTMCNC: 33.5 G/DL (ref 31.4–37.4)
MCV RBC AUTO: 92 FL (ref 82–98)
MONOCYTES # BLD AUTO: 0.89 THOUSAND/ÂΜL (ref 0.17–1.22)
MONOCYTES NFR BLD AUTO: 5 % (ref 4–12)
NEUTROPHILS # BLD AUTO: 16.08 THOUSANDS/ÂΜL (ref 1.85–7.62)
NEUTS SEG NFR BLD AUTO: 85 % (ref 43–75)
NRBC BLD AUTO-RTO: 0 /100 WBCS
O2 CT BLDV-SCNC: 14.9 ML/DL
PCO2 BLDV: 55.7 MM HG (ref 42–50)
PH BLDV: 7.33 [PH] (ref 7.3–7.4)
PLATELET # BLD AUTO: 324 THOUSANDS/UL (ref 149–390)
PMV BLD AUTO: 10.1 FL (ref 8.9–12.7)
PO2 BLDV: 54.3 MM HG (ref 35–45)
POTASSIUM SERPL-SCNC: 3.4 MMOL/L (ref 3.5–5.3)
PROT SERPL-MCNC: 7.4 G/DL (ref 6.4–8.4)
QRS AXIS: 27 DEGREES
QRSD INTERVAL: 92 MS
QT INTERVAL: 474 MS
QTC INTERVAL: 477 MS
RBC # BLD AUTO: 3.99 MILLION/UL (ref 3.81–5.12)
SALICYLATES SERPL-MCNC: <3 MG/DL (ref 3–20)
SODIUM SERPL-SCNC: 137 MMOL/L (ref 135–147)
T WAVE AXIS: 9 DEGREES
VENTRICULAR RATE: 61 BPM
WBC # BLD AUTO: 18.8 THOUSAND/UL (ref 4.31–10.16)

## 2023-02-16 PROCEDURE — HZ2ZZZZ DETOXIFICATION SERVICES FOR SUBSTANCE ABUSE TREATMENT: ICD-10-PCS | Performed by: EMERGENCY MEDICINE

## 2023-02-16 RX ORDER — HYDROCHLOROTHIAZIDE 25 MG/1
25 TABLET ORAL DAILY
Status: DISCONTINUED | OUTPATIENT
Start: 2023-02-17 | End: 2023-02-18 | Stop reason: HOSPADM

## 2023-02-16 RX ORDER — CLONIDINE HYDROCHLORIDE 0.1 MG/1
0.1 TABLET ORAL EVERY 6 HOURS PRN
Status: DISCONTINUED | OUTPATIENT
Start: 2023-02-16 | End: 2023-02-18 | Stop reason: HOSPADM

## 2023-02-16 RX ORDER — ASPIRIN 81 MG/1
81 TABLET ORAL DAILY
Status: DISCONTINUED | OUTPATIENT
Start: 2023-02-17 | End: 2023-02-18 | Stop reason: HOSPADM

## 2023-02-16 RX ORDER — ALBUTEROL SULFATE 90 UG/1
2 AEROSOL, METERED RESPIRATORY (INHALATION) EVERY 6 HOURS PRN
Status: DISCONTINUED | OUTPATIENT
Start: 2023-02-16 | End: 2023-02-18 | Stop reason: HOSPADM

## 2023-02-16 RX ORDER — ENOXAPARIN SODIUM 100 MG/ML
40 INJECTION SUBCUTANEOUS DAILY
Status: DISCONTINUED | OUTPATIENT
Start: 2023-02-17 | End: 2023-02-18 | Stop reason: HOSPADM

## 2023-02-16 RX ORDER — PANTOPRAZOLE SODIUM 40 MG/1
40 TABLET, DELAYED RELEASE ORAL
Status: DISCONTINUED | OUTPATIENT
Start: 2023-02-17 | End: 2023-02-18 | Stop reason: HOSPADM

## 2023-02-16 RX ORDER — ONDANSETRON 2 MG/ML
4 INJECTION INTRAMUSCULAR; INTRAVENOUS EVERY 6 HOURS PRN
Status: DISCONTINUED | OUTPATIENT
Start: 2023-02-16 | End: 2023-02-18 | Stop reason: HOSPADM

## 2023-02-16 RX ORDER — NALOXONE HYDROCHLORIDE 1 MG/ML
1 INJECTION PARENTERAL ONCE
Status: COMPLETED | OUTPATIENT
Start: 2023-02-16 | End: 2023-02-16

## 2023-02-16 RX ORDER — NICOTINE 21 MG/24HR
14 PATCH, TRANSDERMAL 24 HOURS TRANSDERMAL DAILY
Status: DISCONTINUED | OUTPATIENT
Start: 2023-02-17 | End: 2023-02-18 | Stop reason: HOSPADM

## 2023-02-16 RX ORDER — SODIUM CHLORIDE 9 MG/ML
100 INJECTION, SOLUTION INTRAVENOUS CONTINUOUS
Status: DISCONTINUED | OUTPATIENT
Start: 2023-02-16 | End: 2023-02-17

## 2023-02-16 RX ORDER — GABAPENTIN 300 MG/1
300 CAPSULE ORAL EVERY 8 HOURS PRN
Status: DISCONTINUED | OUTPATIENT
Start: 2023-02-16 | End: 2023-02-18 | Stop reason: HOSPADM

## 2023-02-16 RX ORDER — NALOXONE HYDROCHLORIDE 0.4 MG/ML
INJECTION, SOLUTION INTRAMUSCULAR; INTRAVENOUS; SUBCUTANEOUS
Status: COMPLETED
Start: 2023-02-16 | End: 2023-02-16

## 2023-02-16 RX ORDER — ACETAMINOPHEN 325 MG/1
650 TABLET ORAL EVERY 6 HOURS PRN
Status: DISCONTINUED | OUTPATIENT
Start: 2023-02-16 | End: 2023-02-18 | Stop reason: HOSPADM

## 2023-02-16 RX ORDER — ALBUTEROL SULFATE 2.5 MG/3ML
2.5 SOLUTION RESPIRATORY (INHALATION) EVERY 6 HOURS PRN
Status: DISCONTINUED | OUTPATIENT
Start: 2023-02-16 | End: 2023-02-18 | Stop reason: HOSPADM

## 2023-02-16 RX ORDER — BUDESONIDE AND FORMOTEROL FUMARATE DIHYDRATE 160; 4.5 UG/1; UG/1
2 AEROSOL RESPIRATORY (INHALATION) 2 TIMES DAILY
Status: DISCONTINUED | OUTPATIENT
Start: 2023-02-16 | End: 2023-02-18 | Stop reason: HOSPADM

## 2023-02-16 RX ORDER — NALOXONE HYDROCHLORIDE 0.4 MG/ML
0.4 INJECTION, SOLUTION INTRAMUSCULAR; INTRAVENOUS; SUBCUTANEOUS ONCE
Status: COMPLETED | OUTPATIENT
Start: 2023-02-16 | End: 2023-02-16

## 2023-02-16 RX ORDER — AMLODIPINE BESYLATE 5 MG/1
5 TABLET ORAL DAILY
Status: DISCONTINUED | OUTPATIENT
Start: 2023-02-17 | End: 2023-02-18 | Stop reason: HOSPADM

## 2023-02-16 RX ORDER — POTASSIUM CHLORIDE 20 MEQ/1
40 TABLET, EXTENDED RELEASE ORAL ONCE
Status: COMPLETED | OUTPATIENT
Start: 2023-02-16 | End: 2023-02-16

## 2023-02-16 RX ORDER — ANASTROZOLE 1 MG/1
1 TABLET ORAL DAILY
Status: DISCONTINUED | OUTPATIENT
Start: 2023-02-16 | End: 2023-02-18 | Stop reason: HOSPADM

## 2023-02-16 RX ORDER — ONDANSETRON 2 MG/ML
4 INJECTION INTRAMUSCULAR; INTRAVENOUS ONCE
Status: DISCONTINUED | OUTPATIENT
Start: 2023-02-16 | End: 2023-02-16 | Stop reason: HOSPADM

## 2023-02-16 RX ORDER — BUSPIRONE HYDROCHLORIDE 10 MG/1
10 TABLET ORAL 2 TIMES DAILY
Status: DISCONTINUED | OUTPATIENT
Start: 2023-02-16 | End: 2023-02-18 | Stop reason: HOSPADM

## 2023-02-16 RX ADMIN — NALOXONE HYDROCHLORIDE 0.3 MG/HR: 1 INJECTION PARENTERAL at 16:15

## 2023-02-16 RX ADMIN — ANASTROZOLE 1 MG: 1 TABLET, COATED ORAL at 19:29

## 2023-02-16 RX ADMIN — ACETAMINOPHEN 650 MG: 325 TABLET ORAL at 18:40

## 2023-02-16 RX ADMIN — BUSPIRONE HYDROCHLORIDE 10 MG: 10 TABLET ORAL at 18:40

## 2023-02-16 RX ADMIN — NALOXONE HYDROCHLORIDE 0.3 MG/HR: 1 INJECTION INTRAMUSCULAR; INTRAVENOUS; SUBCUTANEOUS at 17:57

## 2023-02-16 RX ADMIN — NALOXONE HYDROCHLORIDE 0.4 MG: 0.4 INJECTION, SOLUTION INTRAMUSCULAR; INTRAVENOUS; SUBCUTANEOUS at 15:07

## 2023-02-16 RX ADMIN — BUDESONIDE AND FORMOTEROL FUMARATE DIHYDRATE 2 PUFF: 160; 4.5 AEROSOL RESPIRATORY (INHALATION) at 19:28

## 2023-02-16 RX ADMIN — POTASSIUM CHLORIDE 40 MEQ: 1500 TABLET, EXTENDED RELEASE ORAL at 20:47

## 2023-02-16 RX ADMIN — SODIUM CHLORIDE 100 ML/HR: 0.9 INJECTION, SOLUTION INTRAVENOUS at 18:13

## 2023-02-16 NOTE — ED PROVIDER NOTES
History  Chief Complaint   Patient presents with   • Heroin Overdose - Accidental     Per EMS, patient found unresponsive  Narcan given      HPI  68-year-old woman presenting after opioid overdose  Patient was found outside unresponsive, EMS administered naloxone and patient woke up  When I went to evaluate the patient she was awake and alert, however was constantly asking "why am I here", "where am I", and "I did not do anything wrong"  Anytime I tried to ask her further questions or explained to her why her clinical picture is clearly consistent with overdose, she would repeat the statements and then it would count from 1-9 between them  She is also frequently counting out loud from 1-9 even when we are not talking to her  I asked her why she was continuously counting but she would not answer that question  She refuses or is unable to provide me with any other history  UPDATED HISTORY: After the patient had been in the emergency department for about 30 minutes, her family presented  They state that the patient had been in her usual state of health as far as they are aware, however they had been trying to "give her some space" as of late so had not seen her a lot over the past couple of days  They note that the patient has history of opioid use disorder and had been on methadone which was being weaned down and ultimately discontinued 8 days ago  Patient continues to deny opioid overdose and not really want to talk about the matter, however family were the ones that found the patient unresponsive today and they confirmed that she responded immediately to naloxone    They are not surprised by the opioid overdose as this has been an issue in the past   I had a long discussion with the patient and her family regarding treatment of opioid use disorder, the patient states she has been on Suboxone in the past and did not like it, does not really want to go back on it but might be willing to consider it if it was something like getting the tabs rather than the strips, etc   She also was amenable to the idea of Vivitrol when we are certain that she is fully through withdrawal       Prior to Admission Medications   Prescriptions Last Dose Informant Patient Reported? Taking? albuterol (2 5 mg/3 mL) 0 083 % nebulizer solution   No No   Sig: Take 1 vial (2 5 mg total) by nebulization every 6 (six) hours as needed for wheezing or shortness of breath for up to 25 doses   albuterol (ACCUNEB) 1 25 MG/3ML nebulizer solution   No No   Sig: Take 3 mL by nebulization every 6 (six) hours as needed for wheezing   albuterol (PROVENTIL HFA,VENTOLIN HFA) 90 mcg/act inhaler   No No   Sig: Inhale 2 puffs every 6 (six) hours as needed for wheezing   amLODIPine (NORVASC) 5 mg tablet   Yes No   anastrozole (ARIMIDEX) 1 mg tablet   No No   Sig: Take 1 tablet (1 mg total) by mouth daily   aspirin (ECOTRIN LOW STRENGTH) 81 mg EC tablet   Yes No   Sig: Take 81 mg by mouth daily   buPROPion (WELLBUTRIN) 75 mg tablet   Yes No   Sig: Take 75 mg by mouth once   budesonide-formoterol (SYMBICORT) 160-4 5 mcg/act inhaler   Yes No   Sig: Inhale 2 puffs 2 (two) times a day Rinse mouth after use     busPIRone (BUSPAR) 10 mg tablet   Yes No   Sig: Take 10 mg by mouth 2 (two) times a day   cholecalciferol (VITAMIN D3) 1,000 units tablet   Yes No   Sig: Take 1,000 Units by mouth daily   hydrochlorothiazide (HYDRODIURIL) 25 mg tablet   Yes No   Sig: Take 25 mg by mouth daily   hydrocortisone (ANUSOL-HC) 2 5 % rectal cream   No No   Sig: Apply rectally 2 times daily   hydrocortisone (ANUSOL-HC) 2 5 % rectal cream   No No   Sig: Apply rectally 2 times daily   hydrocortisone (ANUSOL-HC) 2 5 % rectal cream   No No   Sig: Apply topically 2 (two) times a day   ibuprofen (MOTRIN) 600 mg tablet   No No   Sig: Take 1 tablet (600 mg total) by mouth every 8 (eight) hours as needed for mild pain   ipratropium (ATROVENT) 0 02 % nebulizer solution   No No   Sig: Take 1 vial (0 5 mg total) by nebulization 4 (four) times a day   naproxen (EC NAPROSYN) 500 MG EC tablet   No No   Sig: Take 1 tablet (500 mg total) by mouth 2 (two) times a day with meals   Patient not taking: Reported on 6/11/2021   omeprazole (PriLOSEC) 20 mg delayed release capsule   Yes No   Sig: Take 20 mg by mouth daily   phenazopyridine (PYRIDIUM) 200 mg tablet   No No   Sig: Take 1 tablet (200 mg total) by mouth 3 (three) times a day   polyethylene glycol (MIRALAX) 17 g packet   No No   Sig: Take 17 g by mouth daily for 28 days   Patient not taking: Reported on 6/11/2021      Facility-Administered Medications: None       Past Medical History:   Diagnosis Date   • Asthma    • [facility]    • Cancer ([facility] )     breast cancer   • History of chemotherapy    • Hypertension    • Psychiatric disorder     depression and anxiety       Past Surgical History:   Procedure Laterality Date   • MASTECTOMY  2014    Right side       Family History   Problem Relation Age of Onset   • No Known Problems Mother    • No Known Problems Father    • No Known Problems Daughter    • No Known Problems Maternal Grandmother    • No Known Problems Maternal Grandfather    • No Known Problems Paternal Grandmother    • No Known Problems Paternal Grandfather    • Colon cancer Neg Hx      I have reviewed and agree with the history as documented  E-Cigarette/Vaping   • E-Cigarette Use Never User      E-Cigarette/Vaping Substances   • Nicotine No    • THC No    • CBD No    • Flavoring No    • Other No      Social History     Tobacco Use   • Smoking status: Some Days     Packs/day: 0 00     Types: Cigarettes     Last attempt to quit: 2/1/2020     Years since quitting: 3 0   • Smokeless tobacco: Never   Vaping Use   • Vaping Use: Never used   Substance Use Topics   • Alcohol use: No   • Drug use: No       Review of Systems   Unable to perform ROS: Mental status change       Physical Exam  Physical Exam  Vitals reviewed     HENT:      Head: Normocephalic and atraumatic  Mouth/Throat:      Mouth: Mucous membranes are moist    Eyes:      Conjunctiva/sclera: Conjunctivae normal       Pupils: Pupils are equal, round, and reactive to light  Cardiovascular:      Rate and Rhythm: Normal rate and regular rhythm  Heart sounds: No murmur heard  No friction rub  No gallop  Pulmonary:      Effort: Pulmonary effort is normal  No respiratory distress  Breath sounds: Normal breath sounds  Abdominal:      General: There is no distension  Palpations: Abdomen is soft  Tenderness: There is no abdominal tenderness  Musculoskeletal:         General: Normal range of motion  Cervical back: Neck supple  Right lower leg: No edema  Left lower leg: No edema  Skin:     General: Skin is warm and dry  Neurological:      Mental Status: She is alert  Comments: Patient is confused, does not answer most questions appropriately and only gets a few repetitive answers, constantly counting from 1-9 out loud  Does move all extremities without evident focal motor deficit  Normal muscle tone     Psychiatric:      Comments: Appears mildly anxious         Vital Signs  ED Triage Vitals [02/16/23 1156]   Temperature Pulse Respirations Blood Pressure SpO2   97 6 °F (36 4 °C) (!) 110 16 150/69 93 %      Temp Source Heart Rate Source Patient Position - Orthostatic VS BP Location FiO2 (%)   Oral Monitor Lying Left arm --      Pain Score       --           Vitals:    02/16/23 1156   BP: 150/69   Pulse: (!) 110   Patient Position - Orthostatic VS: Lying         Visual Acuity      ED Medications  Medications   ondansetron (ZOFRAN) injection 4 mg (0 mg Intravenous Hold 2/16/23 1328)   naloxone (NARCAN) 2 mg in sodium chloride 0 9 % 500 mL infusion (has no administration in time range)   naloxone (FOR EMS ONLY) (NARCAN) 2 MG/2ML injection 2 mg (0 mg Does not apply Given to EMS 2/16/23 1156)   naloxone (NARCAN) injection 0 4 mg (0 4 mg Intravenous Given 2/16/23 1507)       Diagnostic Studies  Results Reviewed     Procedure Component Value Units Date/Time    HS Troponin I 4hr [[id number]]     Lab Status: No result Specimen: Blood     Comprehensive metabolic panel [[id number]]  (Abnormal) Collected: 02/16/23 1309    Lab Status: Final result Specimen: Blood from Arm, Left Updated: 02/16/23 1357     Sodium 137 mmol/L      Potassium 3 4 mmol/L      Chloride 104 mmol/L      CO2 29 mmol/L      ANION GAP 4 mmol/L      BUN 22 mg/dL      Creatinine 1 05 mg/dL      Glucose 198 mg/dL      Calcium 9 2 mg/dL      AST 56 U/L      ALT 58 U/L      Alkaline Phosphatase 105 U/L      Total Protein 7 4 g/dL      Albumin 3 7 g/dL      Total Bilirubin 0 15 mg/dL      eGFR 58 ml/min/1 73sq m     Narrative:      [place] guidelines for Chronic Kidney Disease (CKD):   •  Stage 1 with normal or high GFR (GFR > 90 mL/min/1 73 square meters)  •  Stage 2 Mild CKD (GFR = 60-89 mL/min/1 73 square meters)  •  Stage 3A Moderate CKD (GFR = 45-59 mL/min/1 73 square meters)  •  Stage 3B Moderate CKD (GFR = 30-44 mL/min/1 73 square meters)  •  Stage 4 Severe CKD (GFR = 15-29 mL/min/1 73 square meters)  •  Stage 5 End Stage CKD (GFR <15 mL/min/1 73 square meters)  Note: GFR calculation is accurate only with a steady state creatinine    Salicylate level [[id number]]  (Abnormal) Collected: 02/16/23 1309    Lab Status: Final result Specimen: Blood from Arm, Left Updated: 79/48/46 7182     Salicylate Lvl <3 mg/dL     Acetaminophen level-If concentration is detectable, please discuss with medical  on call   [[id number]]  (Abnormal) Collected: 02/16/23 1309    Lab Status: Final result Specimen: Blood from Arm, Left Updated: 02/16/23 1357     Acetaminophen Level <2 ug/mL     HS Troponin 0hr (reflex protocol) [[id number]]  (Normal) Collected: 02/16/23 1309    Lab Status: Final result Specimen: Blood from Arm, Left Updated: 02/16/23 1344     hs TnI 0hr 32 ng/L HS Troponin I 2hr [[id number]]     Lab Status: No result Specimen: Blood     Ethanol [[id number]]  (Normal) Collected: 02/16/23 1309    Lab Status: Final result Specimen: Blood from Arm, Left Updated: 02/16/23 1332     Ethanol Lvl <3 mg/dL     CBC and differential [[id number]]  (Abnormal) Collected: 02/16/23 1309    Lab Status: Final result Specimen: Blood from Arm, Left Updated: 02/16/23 1319     WBC 18 80 Thousand/uL      RBC 3 99 Million/uL      Hemoglobin 12 3 g/dL      Hematocrit 36 7 %      MCV 92 fL      MCH 30 8 pg      MCHC 33 5 g/dL      RDW 14 3 %      MPV 10 1 fL      Platelets 809 Thousands/uL      nRBC 0 /100 WBCs      Neutrophils Relative 85 %      Immat GRANS % 1 %      Lymphocytes Relative 9 %      Monocytes Relative 5 %      Eosinophils Relative 0 %      Basophils Relative 0 %      Neutrophils Absolute 16 08 Thousands/µL      Immature Grans Absolute 0 11 Thousand/uL      Lymphocytes Absolute 1 65 Thousands/µL      Monocytes Absolute 0 89 Thousand/µL      Eosinophils Absolute 0 02 Thousand/µL      Basophils Absolute 0 05 Thousands/µL                  CT head without contrast   Final Result by Elsa Vega MD (02/16 1339)         1  Small area of cortical gliosis and encephalomalacia in the left frontoparietal vertex is stable likely sequela of old infarction  2   Scattered hypodensities in the white matter are slightly increased from the prior study possibly progressive microangiopathy  3   Mild cerebellar volume loss is stable  Workstation performed: [id number]         XR chest 1 view portable    (Results Pending)              Procedures  ECG 12 Lead Documentation Only    Date/Time: 2/16/2023 12:55 PM  Performed by: Efrem Angel MD  Authorized by: Efrem Angel MD     Indications / Diagnosis:  Opioid overdose  ECG reviewed by me, the ED Provider: yes    Patient location:  ED  Interpretation:     Interpretation comment:  Normal sinus rhythm  No ectopy    Normal axis   Normal intervals  No acute ST abnormality  Rate:     ECG rate:  61  CriticalCare Time  Performed by: Maria Luisa Luna MD  Authorized by: Maria Luisa Luna MD     Critical care provider statement:     Critical care time (minutes):  33    Critical care time was exclusive of:  Separately billable procedures and treating other patients and teaching time    Critical care was necessary to treat or prevent imminent or life-threatening deterioration of the following conditions:  Respiratory failure and toxidrome    Critical care was time spent personally by me on the following activities:  Obtaining history from patient or surrogate, development of treatment plan with patient or surrogate, discussions with consultants, evaluation of patient's response to treatment, examination of patient, interpretation of cardiac output measurements, ordering and performing treatments and interventions, ordering and review of laboratory studies, ordering and review of radiographic studies and re-evaluation of patient's condition    I assumed direction of critical care for this patient from another provider in my specialty: no               ED Course  ED Course as of 02/16/23 1539   Thu Feb 16, 2023   1505 Patient with recurrent respiratory and CNS depression, will wake to sternal rub with transient improvement in breathing  Giving naloxone, 0 4 mg IV now and will start infusion at 0 3 mg/hr and titrate as needed                               SBIRT 22yo+    Flowsheet Row Most Recent Value   SBIRT (23 yo +)    In order to provide better care to our patients, we are screening all of our patients for alcohol and drug use  Would it be okay to ask you these screening questions? Unable to answer at this time Filed at: 02/16/2023 1216                    Medical Decision Making  42-year-old woman with history of opioid use disorder presenting after opioid overdose    Initially presented with acute encephalopathy but mental status has been improving here  Per history obtained from daughter the patient had been on methadone up until 8 days ago and had been weaned off it at that point  She was found at home by her daughter unresponsive and responded to naloxone given by EMS  While in the emergency department the patient had recurrence of respiratory and CNS depression and was given additional naloxone and is being started on a naloxone infusion  She will continue to be monitored very closely on telemetry and continuous pulse ox  She has been reevaluated multiple times while in the emergency department  I had an extensive discussion with the patient and her family  They are agreeable to transfer to the medical detox unit at [facility]  Patient had initially not expressed interest in being on Suboxone, however on further discussions she is starting to give more consideration to it  She also indicates she would be very amenable to the idea of Vivitrol once we are certain she is through the withdrawal process  Patient will be transported to [facility] by ALS with continuous telemetry and pulse oximetry monitoring  Of note patient also has leukocytosis and mild elevation in troponin  I think the leukocytosis is most likely due to the overdose, she is afebrile and does not have infectious symptoms, but she will continue to be monitored  I think the elevated troponin is most likely due to the overdose as well, we will continue to trend  She did not have ischemic change on EKG  Acute encephalopathy: acute illness or injury  Elevated troponin: acute illness or injury  Leukocytosis: acute illness or injury  Opioid overdose ([facility] ): acute illness or injury  Opioid use disorder, severe, dependence ([facility] ): acute illness or injury  Amount and/or Complexity of Data Reviewed  Independent Historian: caregiver     Details: Daughter  Labs: ordered  Decision-making details documented in ED Course  Radiology: ordered   Decision-making details documented in ED Course  ECG/medicine tests: ordered and independent interpretation performed  Risk  Prescription drug management  Decision regarding hospitalization  Critical Care  Total time providing critical care: 30-74 minutes      Disposition  Final diagnoses:   Opioid overdose ([facility] )   Opioid use disorder, severe, dependence ([facility] )   Acute encephalopathy   Leukocytosis   Elevated troponin     Time reflects when diagnosis was documented in both MDM as applicable and the Disposition within this note     Time User Action Codes Description Comment    2/16/2023  2:07 PM John Batter A Add [T40 2X1A] [facility]     2/16/2023  2:08 PM John Batter A Add [F11 20] Opioid use disorder, severe, dependence ([facility] )     2/16/2023  2:08 PM Susan Batter A Add [G93 40] Acute encephalopathy     2/16/2023  2:08 PM Susan Batter A Add [D72 829] Leukocytosis     2/16/2023  2:08 PM Laura Dolphin Add [R77 8] Elevated troponin       ED Disposition     ED Disposition   Transfer to Another Facility-In Network    Condition   --    Date/Time   Thu Feb 16, 2023  2:18 PM    Comment   Radha Hidden should be transferred out to [facility] Documentation    Iglesia White Most Recent Value   Patient Condition The patient has been stabilized such that within reasonable medical probability, no material deterioration of the patient condition or the condition of the unborn child(savage) is likely to result from the transfer   Reason for Transfer Level of Care needed not available at this facility   Benefits of Transfer Specialized equipment and/or services available at the receiving facility (Include comment)________________________  [facility] T[facility]ology]   Risks of Transfer Potential for delay in receiving treatment, Potential deterioration of medical condition, Loss of IV, Increased discomfort during transfer, Possible worsening of condition or death during transfer   Accepting Physician Keiry Segura MD Accepting Facility Name, [address], [place]    (Name & Tel [place] [phone]   Sending MD Geno Omer MD   Provider Certification General risk, such as traffic hazards, adverse weather conditions, rough terrain or turbulence, possible failure of equipment (including vehicle or aircraft), or consequences of actions of persons outside the control of the transport personnel, Unanticipated needs of medical equipment and personnel during transport, Risk of worsening condition      RN Documentation    [address] Name, [address], [place]    (Name & Tel number[place] [phone]      Follow-up Information    None         Patient's Medications   Discharge Prescriptions    No medications on file       No discharge procedures on file      PDMP Review       Value Time User    PDMP Reviewed  Yes 2/16/2023  2:24 PM Yamilet Mendoza MD          ED Provider  Electronically Signed by           Emeli Pompa MD  02/16/23 26 [id number]

## 2023-02-16 NOTE — H&P
49 [facility]  H&P- [facility] [place] 1965, 59 y o  female MRN: [id number]  Unit/Bed#: 5T DETOX 513-01 Encounter: [id number]  Primary Care Provider: Ansel Kocher, CRNP   Date and time admitted to hospital: 2/16/2023  5:11 PM      Reason for Admission/Principal Problem: Opioid withdrawal, opioid use disorder  Admitting Provider: Yesica Smith PA-C  Attending Provider: Pamela Soto   2/16/2023  5:11 PM      * Opioid overdose ([facility] [address] )  Assessment & Plan  · Presented to [facility] ED via EMS morning of 2/16/2023 after being found unresponsive at home; EMS administered narcan   · Patient received additional narcan in the ED and was started on narcan infusion PTA  · Upon admission- Patient A&Ox4, VSS  · Reports she had been clean off opioids x 7 yrs, recently stopped methadone on 2/5  · Reports one-time use of fentanyl this AM  · Reports OD as accidental- currently denies SI/HI/thoughts of self-harm  · VBG on admission revealed mild CO2 retention (otherwise pH stable)  · Continue narcan infusion for now  · Will repeat VBG later this evening- plan to stop narcan infusion once evidence of resolving CO2 retention  · Currently no evidence of acute opioid withdrawal  · Continue to monitor with supportive care   · Step-down 2, cardio-pulmonary monitoring     Leukocytosis  Assessment & Plan  · CBC in ED revealed WBCs 18 8, ANC 16 08  · No evidence of acute systemic infection- currently afebrile, VSS  · Likely 2/2 systemic stress response to acute overdose  · Continue to monitor labs and vitals    Opioid use disorder, moderate, dependence ([facility] [address] )  Assessment & Plan  Reports initially started snorting heroin around age 46 to cope with cancer-related pain  · Denies h/o IVDU  Used x 2 yrs until started on methadone   Was stable on methadone x 7 yrs, followed with [facility] in [place]  · Reports taper down x 6 months  · Last reported dose 2/5/2023  · Will call clinic in AM to confirm   Reports she was having intermittent cravings since stopping methadone completely- snorted 0 5 bag fentanyl this AM resulting in overdose (see above)  · Reports use this AM was isolated incident  · Notes she otherwise has not used since starting methadone 7 yrs ago   · Notes she tried suboxone previously (off the streets) but did not like it   · In-depth discussion regarding MAT with Suboxone  · Patient reports she is not interested in any form of MAT at this time  · Case management consulted for assistance with disposition planning    Hypokalemia  Assessment & Plan  · K+ 3 4 on admission  · Administer supplementation  · Continue to monitor electrolytes and supplement as indicated     Elevated AST (SGOT)  Assessment & Plan  · AST mildly elevated on initial CMP  · No acute abdominal pain on exam  · Denies h/o IVDU, no prior h/o hepatitis   · Check acute hepatitis panel  · Continue to monitor     Anxiety and depression  Assessment & Plan  · H/o anxiety/depression  · Currently follows with PCP ([facility])  · Current home regimen bupropion 75 mg daily and buspirone 10 mg BID  · Currently denies SI/thoughts of self-harm  · Reports opioid OD was accidental  · Continue home regimen  · Reports she has appt with outpatient psychiatry next month  · Recommend outpatient f/u PCP, psychiatry     Nicotine dependence  Assessment & Plan  Current every day smoker: 5-6 cigarettes daily   Encourage cessation  Offer nicotine replacement    Hypertension  Assessment & Plan  · H/o HTN  · Home regimen: HCTZ 25 mg daily, amlodipine 5 mg daily  · BP on admission 123/72  · Continue home regimen  · Continue to monitor vitals, BP  · Recommend outpatient PCP f/u    Asthma  Assessment & Plan  · H/o asthma  · Outpatient inhaler regimen: symbicort BID, albuterol inhaler and nebulizer PRN  · Currently without evidence of acute exacerbation: VSS- afebrile, SpO2 95% RA  · Continue home inhaler regimen  · Continue to monitor vitals  · Outpatient f/u PCP    History of cancer of right breast  Assessment & Plan  · H/o stage IIB right breast cancer s/p right breast mastectomy  · Completed chemo 2014  · Currently follows with outpatient heme/onc- most recent appt 11/10/2022  · Currently on anastrozole 1 mg daily  · Continue anastrozole   · Recommend ongoing routine outpatient follow-up heme-onc        VTE Prophylaxis: Enoxaparin (Lovenox)  / sequential compression device   Code Status: level 1 full code       Anticipated Length of Stay:  Patient will be admitted on an Observation basis with an anticipated length of stay of  1  midnights  Justification for Hospital Stay: ongoing monitoring of overdose     For any questions or concerns, please Tiger Text the advanced practitioner in the role of [facility]-DETOX-AP On Call      This patient qualifies for Level IV medically managed intensive inpatient services under the criteria set by the American Society of Addiction Medicine, including dimensions 1-3  The patient is in withdrawal (or is intoxicated with high risk of withdrawal), with severe and unstable medical and/or psychiatric (dual diagnosis) problems, requiring requires 24-hour medical and nursing care and the full resources of a [address] patient to medical detox unit and continue supportive care and stabilization of acute opioid withdrawal per medical toxicology/detox medication assisted treatment pathway  Complicated Opioid Withdrawal (ie associated with long acting agents, such as methadone or illicit fentanyl analogues):  • >72 hours: Routine COWS/induction as per uncomplicated opoid withdrawal (below)  • <72 hours:  • Adjunctive medications (see below), including clonazepam 1-2mg Q6 hrs PRN anxiety (or diazepam 5 mg if cannot take PO)  • >48 hours, test dose buprenorphine 0 5-1mg     • If responds well, continue with 2mg Q2 hrs (total 8mg) holding for worsening withdrawal, then start maintenance dosing   • If responds poorly, follow next step below   • <48 hours and/or COWS < 6 or failed test dose, add Butrans transdermal patch 20-40mcg/hr, monitor for signs/symptoms of withdrawal   • If within first 24-48 hrs, can administer buprenorphine 0 5-1mg Q6 hrs x 4, followed by 2mg Q2 hrs x 4 the next day  • If surpassing 48 hrs, can administer buprenorphine 2mg Q2hrs if tolerated without transdermal patch or lower sublingual dose  • When complete, remove transdermal patch and start maintenance dosing   • For moderate-severe withdrawal (COWS >/= 8, may still consider routine induction with buprenorphine 8 mg if appropriate  • For worsened or precipitated withdrawal, consider adjunctive benzodiazepines and other comfort meds  Dexmedetomidine may be considered for severe precipitated withdrawal          Uncomplicated Opioid Withdrawal:  Monitor opioid severity via Clinical Opioid Withdrawal Scale (COWS) Q4 hours and administer buprenorphine/naloxone 8mg/2mg when COWS >8, or when greater than 24 hours have elapsed from most recent opioid use (excluding long-acting opioids, such as methadone)  Continue to monitor opioid severity Q30-60 minutes after first dose and administer additional buprenorphine 2-4mg every 30-60 minutes until COWS < 8 for two consecutive hours  Adjunctive medications administered as needed:  Clonidine 0 1 mg PO Q6 hours PRN anxiety or palpitations    Gabapentin 300mg PO Q8 hours PRN anxiety    Ibuprofen 600 mg PO Q6 hours PRN pain    Acetaminophen 1000mg PO Q8 hours PRN pain    Ondansetron 4 mg PO Q6 hours PRN N/V    Nicotine patch 7, 14, 21 mg  PRN nicotine withdrawal   Trazodone 50 mg PO QHS PRN sleep    Loperamide 4 mg PO PRN diarrhea up to 16 mg/day       The risks, benefits and mechanism of buprenorphine/naloxone were discussed and patient agreed to treatment   Case management consultation will take place to assist with coordination of subsequent treatment after discharge  Administer daily multivitamin  Evaluate and treat for coexisting substance use, such as nicotine  Discuss risk factors for infectious disease, such as history of intravenous drug abuse, and offer hepatitis and HIV screening if indicated  Hx and PE limited by: n/a    HPI: Yazmin Sanchez is a 59y o  year old female PMH OUD previously on methadone maintenance until 8 days ago, h/o breast cancer s/p right mastectomy, HTN, anxiety/depression who presents with opioid overdose  Originally presented to [facility] ED earlier today via EMS after being found unresponsive at home  Patient received narcan in field prior to ED presentation with positive response  Patient received additional dose of narcan in ED and was started on narcan infusion prior to transfer  Subsequently transferred to [facility] medical detox unit for further management  On admission, patient appeared overall stable- alert and oriented and not exhibiting acute opioid withdrawal symptoms  Patient reported that she initially started snorting heroin age 46 due to cancer-related pain, use for 2 yrs, then transitioned to methadone maintenance therapy  Reports she was following with [facility] in [place], weaned down over 6 months with last dose 2/5/2023  Reports she was having intermittent cravings since stopping methadone treatment and had isolated one-time use of snorting fentanyl this AM  Reports overdose was an accident, was not trying to harm self  Currently states she does not intend to ever use again  States she tried suboxone previously and did not like it  Currently declines all MAT, states she "just wants to stop all drugs "     PMH and medications reviewed  Confirmed current prescriptions and doses, states she is typically compliant but missed today 2/2 acute events       Opioids currently used: fentanyl  Route of use: insufflation  Date/Time of Last Opioid Use: 2/16/2023 AM   Current Signs/Symptoms of Opioid Withdrawal: none     COWS score:   Clinical Opiate Withdrawal Scale  Pulse: 71  Resting Pulse Rate: Measured After Patient is Sitting or Lying for One Minute: Pulse rate 80 or below  GI Upset: Over Last Half Hour: No GI symptoms  Sweating: Over Past Half Hour Not Accounted for by Room Temperature of Patient Activity: No report of chills or flushing  Tremor: Observation of Outstretched Hands: No tremor  Restlessness: Observation During Assessment: Able to sit still  Yawning: Observation During Assessment: No yawning  Pupil Size: Pupils pinned or normal size for room light  Anxiety and Irritability: None  Bone or Joint Aches: If Patient was Having Pain Previously, Only the Additional Component Attributed to Opiate Withdrawal is Scored: Not present  Gooseflesh Skin: Skin is smooth  Runny Nose or Tearing: Not Accounted for by Cold Symptoms or Allergies: Not present  Clinical Opiate Withdrawal Scale Total Score: 0    Methadone & Buprenorphine History  History of prior treatment for opioid dependence? yes  Currently on Methadone Maintenance? No: was previously on methadone maintenance x 7 yrs with [facility] in [place]  Recently weaned down over 6 mos and stopped 8 days ago  History of prior treatment with Suboxone? no  Currently taking Suboxone? no  History of using Suboxone without having a prescription? Yes- took once off the street, "did not like it"  History of IVDA? no  Co-existing substance use? No- denies current use of marijuana, cocaine, meth, alcohol, benzodiazepine       Review of PDMP: yes- no recent records  1  [id number] 02/16/2019  02/15/2019 Acetaminophen 300 Mg / Codeine Phosphate 30 Mg Oral Tablet (Tablet)  16 0 4 30 0 MG/300 0 MG 4 50 MARCO HDZ  [facility]  Commercial Insurance 0 / 0 [place]    1  [id number] 03/31/2017 03/03/2017 clonazePAM (Tablet)  30 0 30 1 MG NA GERTRUDIS YEBOAH  [facility]  Commercial Insurance 1 / 1 [place]    1  [id number] 03/03/2017 03/03/2017 clonazePAM (Tablet) Social History     Substance and Sexual Activity   Alcohol Use No     Social History     Substance and Sexual Activity   Drug Use No     Social History     Tobacco Use   Smoking Status Some Days   • Packs/day: 0 00   • Types: Cigarettes   • Last attempt to quit: 2/1/2020   • Years since quitting: 3 0   Smokeless Tobacco Never       Review of Systems   Constitutional: Negative for appetite change, chills and fever  HENT: Negative for congestion, ear pain, sneezing and sore throat  Eyes: Negative for pain and visual disturbance  Respiratory: Negative for cough and shortness of breath  Cardiovascular: Negative for chest pain and palpitations  Gastrointestinal: Positive for vomiting (1 episode non-bloody emesis in ED PTA)  Negative for abdominal pain, constipation, diarrhea and nausea  Genitourinary: Negative for difficulty urinating, dysuria, hematuria and urgency  Musculoskeletal: Negative for arthralgias and back pain  Skin: Negative for color change and rash  Neurological: Positive for headaches  Negative for dizziness, seizures, syncope and weakness  Psychiatric/Behavioral: Negative for dysphoric mood, hallucinations and suicidal ideas  The patient is not nervous/anxious  All other systems reviewed and are negative        Historical Information   Past Medical History:   Diagnosis Date   • Asthma    • [facility]    • Cancer ([facility] )     breast cancer   • History of chemotherapy    • Hypertension    • Psychiatric disorder     depression and anxiety     Past Surgical History:   Procedure Laterality Date   • MASTECTOMY  2014    Right side     Family History   Problem Relation Age of Onset   • No Known Problems Mother    • No Known Problems Father    • No Known Problems Daughter    • No Known Problems Maternal Grandmother    • No Known Problems Maternal Grandfather    • No Known Problems Paternal Grandmother    • No Known Problems Paternal Grandfather    • Colon cancer Neg Hx Social History   Marital Status: Single   Occupation: disability   Patient Pre-hospital Living Situation: apartment   Patient Pre-hospital Level of Mobility: independent   Patient Pre-hospital Diet Restrictions: none     No Known Allergies    Prior to Admission medications    Medication Sig Start Date End Date Taking? Authorizing Provider   albuterol (2 5 mg/3 mL) 0 083 % nebulizer solution Take 1 vial (2 5 mg total) by nebulization every 6 (six) hours as needed for wheezing or shortness of breath for up to 25 doses 2/3/20   Kenton Sequeira MD   albuterol (ACCUNEB) 1 25 MG/3ML nebulizer solution Take 3 mL by nebulization every 6 (six) hours as needed for wheezing 1/22/18   Mak More, DO   albuterol (PROVENTIL HFA,VENTOLIN HFA) 90 mcg/act inhaler Inhale 2 puffs every 6 (six) hours as needed for wheezing 1/22/18   Mak More, DO   amLODIPine (NORVASC) 5 mg tablet  3/16/21   Historical Provider, MD   anastrozole (ARIMIDEX) 1 mg tablet Take 1 tablet (1 mg total) by mouth daily 11/10/22   Alec Xie MD   aspirin (ECOTRIN LOW STRENGTH) 81 mg EC tablet Take 81 mg by mouth daily    Historical Provider, MD   budesonide-formoterol (SYMBICORT) 160-4 5 mcg/act inhaler Inhale 2 puffs 2 (two) times a day Rinse mouth after use      Historical Provider, MD   buPROPion [facility]) 75 mg tablet Take 75 mg by mouth once    Historical Provider, MD   busPIRone (BUSPAR) 10 mg tablet Take 10 mg by mouth 2 (two) times a day    Historical Provider, MD   cholecalciferol (VITAMIN D3) 1,000 units tablet Take 1,000 Units by mouth daily    Historical Provider, MD   hydrochlorothiazide (HYDRODIURIL) 25 mg tablet Take 25 mg by mouth daily    Historical Provider, MD   hydrocortisone (ANUSOL-HC) 2 5 % rectal cream Apply rectally 2 times daily 9/6/19   Lissy Islas MD   hydrocortisone (ANUSOL-HC) 2 5 % rectal cream Apply rectally 2 times daily 2/13/20   Raffy Cerda MD   hydrocortisone (ANUSOL-HC) 2 5 % rectal cream Apply topically 2 (two) times a day 9/10/20   Carroll Lindquist MD   ibuprofen (MOTRIN) 600 mg tablet Take 1 tablet (600 mg total) by mouth every 8 (eight) hours as needed for mild pain 11/10/22   Renea Bowling MD   ipratropium (ATROVENT) 0 02 % nebulizer solution Take 1 vial (0 5 mg total) by nebulization 4 (four) times a day 2/3/20   Elena Rachel MD   naproxen (EC NAPROSYN) 500 MG EC tablet Take 1 tablet (500 mg total) by mouth 2 (two) times a day with meals  Patient not taking: Reported on 6/11/2021 2/4/19 2/4/20  Cristian Dumont PA-C   omeprazole (PriLOSEC) 20 mg delayed release capsule Take 20 mg by mouth daily    Historical Provider, MD   phenazopyridine (PYRIDIUM) 200 mg tablet Take 1 tablet (200 mg total) by mouth 3 (three) times a day 8/25/21   Moncho Lieberman MD   polyethylene glycol (MIRALAX) 17 g packet Take 17 g by mouth daily for 28 days  Patient not taking: Reported on 6/11/2021 2/13/20 3/12/20  Dang Sheehan MD       Current Facility-Administered Medications   Medication Dose Route Frequency   • acetaminophen (TYLENOL) tablet 650 mg  650 mg Oral Q6H PRN   • albuterol (PROVENTIL HFA,VENTOLIN HFA) inhaler 2 puff  2 puff Inhalation Q6H PRN   • albuterol inhalation solution 2 5 mg  2 5 mg Nebulization Q6H PRN   • [START ON 2/17/2023] amLODIPine (NORVASC) tablet 5 mg  5 mg Oral Daily   • anastrozole (ARIMIDEX) tablet 1 mg  1 mg Oral Daily   • [START ON 2/17/2023] aspirin (ECOTRIN LOW STRENGTH) EC tablet 81 mg  81 mg Oral Daily   • budesonide-formoterol (SYMBICORT) 160-4 5 mcg/act inhaler 2 puff  2 puff Inhalation BID   • busPIRone (BUSPAR) tablet 10 mg  10 mg Oral BID   • cloNIDine (CATAPRES) tablet 0 1 mg  0 1 mg Oral Q6H PRN   • [START ON 2/17/2023] enoxaparin (LOVENOX) subcutaneous injection 40 mg  40 mg Subcutaneous Daily   • gabapentin (NEURONTIN) capsule 300 mg  300 mg Oral Q8H PRN   • [START ON 2/17/2023] hydrochlorothiazide (HYDRODIURIL) tablet 25 mg  25 mg Oral Daily • [START ON 2/17/2023] multivitamin-minerals (CENTRUM) tablet 1 tablet  1 tablet Oral Daily   • naloxone (NARCAN) 10 mg in sodium chloride 0 9 % 100 mL infusion  0 3 mg/hr Intravenous Continuous   • [START ON 2/17/2023] nicotine (NICODERM CQ) 14 mg/24hr TD 24 hr patch 14 mg  14 mg Transdermal Daily   • ondansetron (ZOFRAN) injection 4 mg  4 mg Intravenous Q6H PRN   • [START ON 2/17/2023] pantoprazole (PROTONIX) EC tablet 40 mg  40 mg Oral Early Morning   • sodium chloride 0 9 % infusion  100 mL/hr Intravenous Continuous       Continuous Infusions:naloxone (NARCAN) 500 mL infusion, 0 3 mg/hr, Last Rate: 0 3 mg/hr (02/16/23 1757)  sodium chloride, 100 mL/hr, Last Rate: 100 mL/hr (02/16/23 1813)             Objective     No intake or output data in the 24 hours ending 02/16/23 2051    Invasive Devices:   Peripheral IV 02/16/23 Left;Proximal;Ventral (anterior) Forearm (Active)   Site Assessment WDL 02/16/23 1309       Peripheral IV 02/16/23 Dorsal (posterior); Right Hand (Active)       Vitals    02/16/23 1720 02/16/23 1730   BP: 123/72    TempSrc: Temporal    Pulse: 66 66   Resp: 18    Patient Position - Orthostatic VS: Lying    Temp: 97 6 °F (36 4 °C)        Physical Exam  Vitals reviewed  Constitutional:       General: She is not in acute distress  Appearance: Normal appearance  She is normal weight  She is not ill-appearing or diaphoretic  HENT:      Head: Normocephalic and atraumatic  Nose: Nose normal  No rhinorrhea  Mouth/Throat:      Mouth: Mucous membranes are moist       Pharynx: Oropharynx is clear  Eyes:      General: No scleral icterus  Extraocular Movements: Extraocular movements intact  Conjunctiva/sclera: Conjunctivae normal       Pupils: Pupils are equal, round, and reactive to light  Cardiovascular:      Rate and Rhythm: Normal rate and regular rhythm  Pulses:           Dorsalis pedis pulses are 2+ on the right side and 2+ on the left side          Posterior tibial pulses are 2+ on the right side and 2+ on the left side  Heart sounds: Normal heart sounds  No murmur heard  No friction rub  No gallop  Pulmonary:      Effort: Pulmonary effort is normal  No respiratory distress  Breath sounds: No wheezing, rhonchi or rales  Abdominal:      General: Abdomen is flat  Bowel sounds are normal  There is no distension  Palpations: Abdomen is soft  Tenderness: There is no abdominal tenderness  There is no guarding  Musculoskeletal:         General: No swelling or tenderness  Normal range of motion  Cervical back: Normal range of motion  Right lower leg: No edema  Left lower leg: No edema  Skin:     General: Skin is warm and dry  Comments: No piloerection    Neurological:      General: No focal deficit present  Mental Status: She is alert and oriented to person, place, and time  Mental status is at baseline  Cranial Nerves: No facial asymmetry  Sensory: No sensory deficit  Motor: No tremor  Psychiatric:         Attention and Perception: Attention normal  She does not perceive auditory or visual hallucinations  Mood and Affect: Mood normal          Speech: Speech normal          Behavior: Behavior is cooperative  Thought Content: Thought content does not include homicidal or suicidal ideation  Thought content does not include homicidal or suicidal plan  DATA    EKG, Pathology, and Other Studies: I have personally reviewed pertinent reports  · EKG (2/16/2023 1628): normal sinus rhythm, QTc 470 ms  Inverted T wave V1 (unchanged)  No acute ST elevation/depression  Lab Results: I have personally reviewed pertinent reports          CBC ETOH     Lab Results   Component Value Date    WBC 18 80 (H) 02/16/2023    WBC 7 68 02/09/2015    RBC 3 99 02/16/2023    RBC 4 35 02/09/2015    HGB 12 3 02/16/2023    HGB 13 1 02/09/2015    HCT 36 7 02/16/2023    HCT 39 3 02/09/2015    MCV 92 02/16/2023    MCV 90 02/09/2015    MCH 30 8 02/16/2023    MCH 30 1 02/09/2015    MCHC 33 5 02/16/2023    MCHC 33 3 02/09/2015    RDW 14 3 02/16/2023    RDW 14 0 02/09/2015     02/16/2023     02/09/2015    MPV 10 1 02/16/2023    MPV 10 8 02/09/2015      No results found for: LACTICACID   CMP UA         Component Value Date/Time     07/30/2014 0948    K 3 4 (L) 02/16/2023 1309    K 3 3 (L) 07/30/2014 0948     02/16/2023 1309     07/30/2014 0948    CO2 29 02/16/2023 1309    CO2 30 07/30/2014 0948    BUN 22 02/16/2023 1309    BUN 18 07/30/2014 0948    CREATININE 1 05 02/16/2023 1309    CREATININE 0 86 07/30/2014 0948         Component Value Date/Time    CALCIUM 9 2 02/16/2023 1309    CALCIUM 9 4 07/30/2014 0948    ALKPHOS 105 02/16/2023 1309    ALKPHOS 103 07/30/2014 0948    AST 56 (H) 02/16/2023 1309    AST 16 07/30/2014 0948    ALT 58 02/16/2023 1309    ALT 17 07/30/2014 0948    BILITOT 0 18 (L) 07/30/2014 0948      Lab Results   Component Value Date    CLARITYU slightly cloudy 08/25/2021    CLARITYU Slightly Cloudy 08/25/2021    CLARITYU Cloudy 11/06/2015    COLORU yellow 08/25/2021    COLORU Yellow 08/25/2021    COLORU Yellow 11/06/2015    SPECGRAV 1 025 08/25/2021    SPECGRAV 1 015 11/06/2015    PHUR 5 5 08/25/2021    PHUR 6 0 11/06/2015    GLUCOSEU Negative 08/25/2021    GLUCOSEU Negative 11/06/2015    KETONESU Negative 08/25/2021    KETONESU Negative 11/06/2015    BLOODU Large (A) 08/25/2021    BLOODU Large (A) 11/06/2015    PROTEIN  (2+) (A) 08/25/2021    NITRITE Negative 08/25/2021    NITRITE Negative 11/06/2015    BILIRUBINUR Negative 08/25/2021    BILIRUBINUR Negative 11/06/2015    UROBILINOGEN 0 2 08/25/2021    UROBILINOGEN 0 2 11/06/2015    LEUKOCYTESUR Moderate (A) 08/25/2021    LEUKOCYTESUR Large (A) 11/06/2015    WBCUA Innumerable (A) 08/25/2021    WBCUA Innumerable (A) 11/06/2015    RBCUA Innumerable (A) 08/25/2021    RBCUA 4-10 (A) 11/06/2015    HYALINE None Seen 08/25/2021    BACTERIA Occasional 08/25/2021    BACTERIA Occasional 11/06/2015    EPIS None Seen 08/25/2021    EPIS None Seen 11/06/2015        Liver Function Test: ASA     Lab Results   Component Value Date    TBILI 0 15 (L) 02/16/2023    ALKPHOS 105 02/16/2023    ALKPHOS 103 07/30/2014    AST 56 (H) 02/16/2023    AST 16 07/30/2014    ALT 58 02/16/2023    ALT 17 07/30/2014    TP 7 4 02/16/2023    ALB 3 7 02/16/2023    ALB 3 3 (L) 07/30/2014      Lab Results   Component Value Date    SALICYLATE <3 (L) 45/49/6586      Troponin APAP     No results found for: TROPONINI   Lab Results   Component Value Date    ACTMNPHEN <2 (L) 02/16/2023      VBG HCG     Lab Results   Component Value Date/Time    PHVEN 7 332 02/16/2023 06:27 PM    [id number] 55 7 (H) 02/16/2023 06:27 PM    PO2VEN 54 3 (H) 02/16/2023 06:27 PM    XRT4DLW 28 8 02/16/2023 06:27 PM    BEVEN 1 9 02/16/2023 06:27 PM    [id number] 14 9 02/16/2023 06:27 PM    [id number] 83 9 (H) 02/16/2023 06:27 PM      No results found for: HCGQUANT   ABG Urine Drug Screen     No results found for: PHART, PDL3XBO, PO2ART, JBK2LWK, BEART, H9JKOHWQA, O2HGB, SOURC, MORENA, VTAC, ACRATE, INSPIREDAIR, PEEP   Lab Results   Component Value Date    BARBTUR Negative 06/12/2014    BDZUR Negative 06/12/2014    COCAINEUR Positive (A) 06/12/2014    METHADONEUR Negative 06/12/2014    OPIATEUR Positive (A) 06/12/2014    PCPUR Negative 06/12/2014    THCUR Negative 06/12/2014      Lactate INR     No results found for: LACTICACID   Lab Results   Component Value Date    INR 0 95 09/10/2020    INR 1 04 07/30/2014      PTT Protime     Lab Results   Component Value Date/Time    PTT 46 (H) 09/10/2020 11:06 AM    PTT 28 07/30/2014 09:48 AM      Lab Results   Component Value Date/Time    PROTIME 12 7 09/10/2020 11:06 AM    PROTIME 13 4 07/30/2014 09:48 AM      Hepatitis HIV     Lab Results   Component Value Date    HEPBSAG Non-reactive 10/01/2018    HEPCAB Non-reactive 10/01/2018      No results found for: GKVBQAM1OVW2, EPV9B15GK Imaging Studies: I have personally reviewed pertinent reports  Counseling / Coordination of Care  Total floor / unit time spent today 63 minutes  Greater than 50% of total time was spent with the patient and / or family counseling and / or coordination of care  Minutes of critical care time 29  -Critical care time was exclusive of separately billable procedures and teaching time    -Critical care was necessary to treat or prevent imminent or life-threatening deterioration of the following condition: CNS failure/compromise, toxidrome (opioid withdrawal), withdrawal and overdose  -Critical care time was spent personally by me on the following activities as well as the above as per the course and rest of chart: obtaining history from patient/surrogate, development of a treatment plan, discussions with referring provider(s), evaluation of patient's response to the treatment, examination of the patient, performing  treatments and interventions, re-evaluation of the patient's condition, review of old charts, ordering/interpreting laboratory studies, ordering/interpreting of radiographic studies  ** Please Note: This note has been constructed using a voice recognition system   **

## 2023-02-16 NOTE — EMTALA/ACUTE CARE TRANSFER
[address] [facility] [id number]  Dept: [phone]      EMTALA TRANSFER CONSENT    NAME Rachael Ball 1965                              MRN [id number]    I have been informed of my rights regarding examination, treatment, and transfer   by Dr Gabo Puentes MD    Benefits: Specialized equipment and/or services available at the receiving facility (Include comment)________________________ (Medical Toxicology)    Risks: Potential for delay in receiving treatment, Potential deterioration of medical condition, Loss of IV, Increased discomfort during transfer, Possible worsening of condition or death during transfer      Consent for Transfer:  I acknowledge that my medical condition has been evaluated and explained to me by the emergency department physician or other qualified medical person and/or my attending physician, who has recommended that I be transferred to the service of  Accepting Physician: Kenyetta Suazo MD at [address] [place] [address] Name, [address] : [address] [facility]  The above potential benefits of such transfer, the potential risks associated with such transfer, and the probable risks of not being transferred have been explained to me, and I fully understand them  The doctor has explained that, in my case, the benefits of transfer outweigh the risks  I agree to be transferred  I authorize the performance of emergency medical procedures and treatments upon me in both transit and upon arrival at the receiving facility  Additionally, I authorize the release of any and all medical records to the receiving facility and request they be transported with me, if possible  I understand that the safest mode of transportation during a medical emergency is an ambulance and that the Hospital advocates the use of this mode of transport   Risks of traveling to the receiving facility by car, including absence of medical control, life sustaining equipment, such as oxygen, and medical personnel has been explained to me and I fully understand them  (TINA CORRECT BOX BELOW)  [  ]  I consent to the stated transfer and to be transported by ambulance/helicopter  [  ]  I consent to the stated transfer, but refuse transportation by ambulance and accept full responsibility for my transportation by car  I understand the risks of non-ambulance transfers and I exonerate the Hospital and its staff from any deterioration in my condition that results from this refusal     X___________________________________________    DATE  23  TIME________  Signature of patient or legally responsible individual signing on patient behalf           RELATIONSHIP TO PATIENT_________________________          Provider Certification    [facility] 1965                              MRN [id number]    A medical screening exam was performed on the above named patient  Based on the examination:    Condition Necessitating Transfer The primary encounter diagnosis was Opioid overdose ([facility] [address] )  Diagnoses of Opioid use disorder, severe, dependence ([facility] [address] ), Acute encephalopathy, Leukocytosis, and Elevated troponin were also pertinent to this visit      Patient Condition: The patient has been stabilized such that within reasonable medical probability, no material deterioration of the patient condition or the condition of the unborn child(savage) is likely to result from the transfer    Reason for Transfer: Level of Care needed not available at this facility    Transfer Requirements: [address], [place]   · Space available and qualified personnel available for treatment as acknowledged by [phone]  · Agreed to accept transfer and to provide appropriate medical treatment as acknowledged by       Juan José Snyder MD  · Appropriate medical records of the examination and treatment of the patient are provided at the time of transfer   [address] [facility], [address] _______  · Transfer will be performed by qualified personnel from    and appropriate transfer equipment as required, including the use of necessary and appropriate life support measures  Provider Certification: I have examined the patient and explained the following risks and benefits of being transferred/refusing transfer to the patient/family:  General risk, such as traffic hazards, adverse weather conditions, rough terrain or turbulence, possible failure of equipment (including vehicle or aircraft), or consequences of actions of persons outside the control of the transport personnel, Unanticipated needs of medical equipment and personnel during transport, Risk of worsening condition      Based on these reasonable risks and benefits to the patient and/or the unborn child(savage), and based upon the information available at the time of the patient’s examination, I certify that the medical benefits reasonably to be expected from the provision of appropriate medical treatments at another medical facility outweigh the increasing risks, if any, to the individual’s medical condition, and in the case of labor to the unborn child, from effecting the transfer      X____________________________________________ DATE 02/16/23        TIME_______      ORIGINAL - SEND TO MEDICAL RECORDS   COPY - SEND WITH PATIENT DURING TRANSFER

## 2023-02-16 NOTE — ASSESSMENT & PLAN NOTE
· CBC in ED revealed WBCs 18 8, ANC 16 08  · No evidence of acute systemic infection- currently afebrile, VSS  · Likely 2/2 systemic stress response to acute overdose  · Continue to monitor labs and vitals

## 2023-02-16 NOTE — PLAN OF CARE
Problem: SUBSTANCE USE/ABUSE  Goal: By discharge, will develop insight into their chemical dependency and sustain motivation to continue in recovery  Description: INTERVENTIONS:  - Attends all daily group sessions and scheduled AA groups  - Actively practices coping skills through participation in the therapeutic community and adherence to program rules  - Reviews and completes assignments from individual treatment plan  - Assist patient development of understanding of their personal cycle of addiction and relapse triggers  Outcome: Progressing  Goal: By discharge, patient will have ongoing treatment plan addressing chemical dependency  Description: INTERVENTIONS:  - Assist patient with resources and/or appointments for ongoing recovery based living  Outcome: Progressing     Problem: PAIN - ADULT  Goal: Verbalizes/displays adequate comfort level or baseline comfort level  Description: Interventions:  - Encourage patient to monitor pain and request assistance  - Assess pain using appropriate pain scale  - Administer analgesics based on type and severity of pain and evaluate response  - Implement non-pharmacological measures as appropriate and evaluate response  - Consider cultural and social influences on pain and pain management  - Notify physician/advanced practitioner if interventions unsuccessful or patient reports new pain  Outcome: Progressing     Problem: INFECTION - ADULT  Goal: Absence or prevention of progression during hospitalization  Description: INTERVENTIONS:  - Assess and monitor for signs and symptoms of infection  - Monitor lab/diagnostic results  - Monitor all insertion sites, i e  indwelling lines, tubes, and drains  - Monitor endotracheal if appropriate and nasal secretions for changes in amount and color  - [place] appropriate cooling/warming therapies per order  - Administer medications as ordered  - Instruct and encourage patient and family to use good hand hygiene technique  - Identify and instruct in appropriate isolation precautions for identified infection/condition  Outcome: Progressing  Goal: Absence of fever/infection during neutropenic period  Description: INTERVENTIONS:  - Monitor WBC    Outcome: Progressing     Problem: SAFETY ADULT  Goal: Patient will remain free of falls  Description: INTERVENTIONS:  - Educate patient/family on patient safety including physical limitations  - Instruct patient to call for assistance with activity   - Consult OT/PT to assist with strengthening/mobility   - Keep Call bell within reach  - Keep bed low and locked with side rails adjusted as appropriate  - Keep care items and personal belongings within reach  - Initiate and maintain comfort rounds  - Make Fall Risk Sign visible to staff  - Apply yellow socks and bracelet for high fall risk patients  - Consider moving patient to room near nurses station  Outcome: Progressing  Goal: Maintain or return to baseline ADL function  Description: INTERVENTIONS:  -  Assess patient's ability to carry out ADLs; assess patient's baseline for ADL function and identify physical deficits which impact ability to perform ADLs (bathing, care of mouth/teeth, toileting, grooming, dressing, etc )  - Assess/evaluate cause of self-care deficits   - Assess range of motion  - Assess patient's mobility; develop plan if impaired  - Assess patient's need for assistive devices and provide as appropriate  - Encourage maximum independence but intervene and supervise when necessary  - Involve family in performance of ADLs  - Assess for home care needs following discharge   - Consider OT consult to assist with ADL evaluation and planning for discharge  - Provide patient education as appropriate  Outcome: Progressing  Goal: Maintains/Returns to pre admission functional level  Description: INTERVENTIONS:  - Perform BMAT or MOVE assessment daily    - Set and communicate daily mobility goal to care team and patient/family/caregiver     - Collaborate with rehabilitation services on mobility goals if consulted  - Out of bed for toileting  - Record patient progress and toleration of activity level   Outcome: Progressing     Problem: DISCHARGE PLANNING  Goal: Discharge to home or other facility with appropriate resources  Description: INTERVENTIONS:  - Identify barriers to discharge w/patient and caregiver  - Arrange for needed discharge resources and transportation as appropriate  - Identify discharge learning needs (meds, wound care, etc )  - Arrange for interpretive services to assist at discharge as needed  - Refer to Case Management Department for coordinating discharge planning if the patient needs post-hospital services based on physician/advanced practitioner order or complex needs related to functional status, cognitive ability, or social support system  Outcome: Progressing     Problem: Knowledge Deficit  Goal: Patient/family/caregiver demonstrates understanding of disease process, treatment plan, medications, and discharge instructions  Description: Complete learning assessment and assess knowledge base    Interventions:  - Provide teaching at level of understanding  - Provide teaching via preferred learning methods  Outcome: Progressing

## 2023-02-17 PROBLEM — R79.89 ELEVATED SERUM CREATININE: Status: ACTIVE | Noted: 2023-02-17

## 2023-02-17 PROBLEM — D64.9 ANEMIA: Status: ACTIVE | Noted: 2023-02-17

## 2023-02-17 PROBLEM — D72.829 LEUKOCYTOSIS: Status: RESOLVED | Noted: 2023-02-16 | Resolved: 2023-02-17

## 2023-02-17 PROBLEM — T40.2X1A OPIOID OVERDOSE (HCC): Status: RESOLVED | Noted: 2023-02-16 | Resolved: 2023-02-17

## 2023-02-17 PROBLEM — E87.6 HYPOKALEMIA: Status: RESOLVED | Noted: 2023-02-16 | Resolved: 2023-02-17

## 2023-02-17 PROBLEM — R79.89 ELEVATED SERUM CREATININE: Status: RESOLVED | Noted: 2023-02-17 | Resolved: 2023-02-17

## 2023-02-17 LAB
ALBUMIN SERPL BCP-MCNC: 3.5 G/DL (ref 3.5–5)
ALP SERPL-CCNC: 85 U/L (ref 43–122)
ALT SERPL W P-5'-P-CCNC: 35 U/L
ANION GAP SERPL CALCULATED.3IONS-SCNC: 5 MMOL/L (ref 5–14)
AST SERPL W P-5'-P-CCNC: 38 U/L (ref 14–36)
ATRIAL RATE: 60 BPM
BASE EX.OXY STD BLDV CALC-SCNC: 96.8 % (ref 60–80)
BASE EXCESS BLDV CALC-SCNC: 1.2 MMOL/L
BILIRUB SERPL-MCNC: 0.33 MG/DL (ref 0.2–1)
BUN SERPL-MCNC: 19 MG/DL (ref 5–25)
CALCIUM SERPL-MCNC: 8.7 MG/DL (ref 8.4–10.2)
CHLORIDE SERPL-SCNC: 103 MMOL/L (ref 96–108)
CO2 SERPL-SCNC: 29 MMOL/L (ref 21–32)
CREAT SERPL-MCNC: 0.73 MG/DL (ref 0.6–1.2)
ERYTHROCYTE [DISTWIDTH] IN BLOOD BY AUTOMATED COUNT: 14.4 % (ref 11.6–15.1)
GFR SERPL CREATININE-BSD FRML MDRD: 90 ML/MIN/1.73SQ M
GLUCOSE SERPL-MCNC: 100 MG/DL (ref 70–99)
HAV IGM SER QL: NORMAL
HBV CORE IGM SER QL: NORMAL
HBV SURFACE AG SER QL: NORMAL
HCO3 BLDV-SCNC: 25.4 MMOL/L (ref 24–30)
HCT VFR BLD AUTO: 32.8 % (ref 34.8–46.1)
HCV AB SER QL: NORMAL
HGB BLD-MCNC: 10.9 G/DL (ref 11.5–15.4)
MAGNESIUM SERPL-MCNC: 1.9 MG/DL (ref 1.6–2.3)
MCH RBC QN AUTO: 30 PG (ref 26.8–34.3)
MCHC RBC AUTO-ENTMCNC: 33.2 G/DL (ref 31.4–37.4)
MCV RBC AUTO: 90 FL (ref 82–98)
O2 CT BLDV-SCNC: 15.1 ML/DL
P AXIS: 53 DEGREES
PCO2 BLDV: 38.7 MM HG (ref 42–50)
PH BLDV: 7.43 [PH] (ref 7.3–7.4)
PLATELET # BLD AUTO: 261 THOUSANDS/UL (ref 149–390)
PMV BLD AUTO: 10.3 FL (ref 8.9–12.7)
PO2 BLDV: 174.7 MM HG (ref 35–45)
POTASSIUM SERPL-SCNC: 3.7 MMOL/L (ref 3.5–5.3)
PR INTERVAL: 152 MS
PROT SERPL-MCNC: 6.7 G/DL (ref 6.4–8.4)
QRS AXIS: 48 DEGREES
QRSD INTERVAL: 94 MS
QT INTERVAL: 470 MS
QTC INTERVAL: 470 MS
RBC # BLD AUTO: 3.63 MILLION/UL (ref 3.81–5.12)
SODIUM SERPL-SCNC: 137 MMOL/L (ref 135–147)
T WAVE AXIS: 37 DEGREES
VENTRICULAR RATE: 60 BPM
WBC # BLD AUTO: 9.55 THOUSAND/UL (ref 4.31–10.16)

## 2023-02-17 RX ORDER — BUPRENORPHINE 2 MG/1
0.5 TABLET SUBLINGUAL ONCE
Status: COMPLETED | OUTPATIENT
Start: 2023-02-17 | End: 2023-02-17

## 2023-02-17 RX ORDER — BUPRENORPHINE AND NALOXONE 2; .5 MG/1; MG/1
1 FILM, SOLUBLE BUCCAL; SUBLINGUAL
Status: COMPLETED | OUTPATIENT
Start: 2023-02-17 | End: 2023-02-17

## 2023-02-17 RX ORDER — BUPRENORPHINE AND NALOXONE 2; .5 MG/1; MG/1
4 FILM, SOLUBLE BUCCAL; SUBLINGUAL 2 TIMES DAILY
Status: DISCONTINUED | OUTPATIENT
Start: 2023-02-17 | End: 2023-02-18 | Stop reason: HOSPADM

## 2023-02-17 RX ADMIN — BUPRENORPHINE AND NALOXONE 1 MG: 2; .5 FILM BUCCAL; SUBLINGUAL at 18:04

## 2023-02-17 RX ADMIN — BUDESONIDE AND FORMOTEROL FUMARATE DIHYDRATE 2 PUFF: 160; 4.5 AEROSOL RESPIRATORY (INHALATION) at 20:04

## 2023-02-17 RX ADMIN — MULTIPLE VITAMINS W/ MINERALS TAB 1 TABLET: TAB ORAL at 08:13

## 2023-02-17 RX ADMIN — BUPRENORPHINE AND NALOXONE 4 MG: 2; .5 FILM BUCCAL; SUBLINGUAL at 20:04

## 2023-02-17 RX ADMIN — BUPRENORPHINE AND NALOXONE 1 MG: 2; .5 FILM BUCCAL; SUBLINGUAL at 12:39

## 2023-02-17 RX ADMIN — ASPIRIN 81 MG: 81 TABLET, COATED ORAL at 08:12

## 2023-02-17 RX ADMIN — BUPRENORPHINE AND NALOXONE 1 MG: 2; .5 FILM BUCCAL; SUBLINGUAL at 14:21

## 2023-02-17 RX ADMIN — BUDESONIDE AND FORMOTEROL FUMARATE DIHYDRATE 2 PUFF: 160; 4.5 AEROSOL RESPIRATORY (INHALATION) at 08:12

## 2023-02-17 RX ADMIN — Medication 0.5 MG: at 10:36

## 2023-02-17 RX ADMIN — AMLODIPINE BESYLATE 5 MG: 5 TABLET ORAL at 08:13

## 2023-02-17 RX ADMIN — BUSPIRONE HYDROCHLORIDE 10 MG: 10 TABLET ORAL at 18:04

## 2023-02-17 RX ADMIN — HYDROCHLOROTHIAZIDE 25 MG: 25 TABLET ORAL at 08:13

## 2023-02-17 RX ADMIN — BUPRENORPHINE AND NALOXONE 1 MG: 2; .5 FILM BUCCAL; SUBLINGUAL at 16:30

## 2023-02-17 RX ADMIN — PANTOPRAZOLE SODIUM 40 MG: 40 TABLET, DELAYED RELEASE ORAL at 06:05

## 2023-02-17 RX ADMIN — BUSPIRONE HYDROCHLORIDE 10 MG: 10 TABLET ORAL at 08:13

## 2023-02-17 RX ADMIN — ANASTROZOLE 1 MG: 1 TABLET, COATED ORAL at 08:13

## 2023-02-17 NOTE — ASSESSMENT & PLAN NOTE
· AST mildly elevated on initial CMP  · No acute abdominal pain on exam  · Denies h/o IVDU, no prior h/o hepatitis   · Check acute hepatitis panel  · Continue to monitor

## 2023-02-17 NOTE — ASSESSMENT & PLAN NOTE
· Presented to [facility] ED via EMS morning of 2/16/2023 after being found unresponsive at home; EMS administered narcan   · Patient received additional narcan in the ED and was started on narcan infusion PTA  · Upon admission- Patient A&Ox4, VSS  · Reports she had been clean off opioids x 7 yrs, recently stopped methadone on 2/5  · Reports one-time use of fentanyl yesterday AM  · Reports OD as accidental- currently denies SI/HI/thoughts of self-harm  · Narcan infusion was continued on admission  · VBG on admission revealed mild CO2 retention (otherwise pH stable)  · Repeat VBG showed resolution of CO2 retention; thus narcan infusion was discontinued   · Currently no evidence of acute opioid withdrawal  · Currently no evidence of further toxicological affects from opioid overdose- resolved   · Continue to monitor with supportive care

## 2023-02-17 NOTE — ASSESSMENT & PLAN NOTE
Reports initially started snorting heroin around age 46 to cope with cancer-related pain  · Denies h/o IVDU  Used x 2 yrs until started on methadone   Was stable on methadone x 7 yrs, followed with Ar[facility] Dominguez in [facility]  · Reports taper down x 6 months  · Last reported dose 2/5/2023  · Will call clinic in AM to confirm   Reports she was having intermittent cravings since stopping methadone completely- snorted 0 5 bag fentanyl this AM resulting in overdose (see above)  · Reports use this AM was isolated incident  · Notes she otherwise has not used since starting methadone 7 yrs ago   · Notes she tried suboxone previously (off the streets) but did not like it   · In-depth discussion regarding MAT with Suboxone  · Patient reports she is not interested in any form of MAT at this time  · Case management consulted for assistance with disposition planning

## 2023-02-17 NOTE — ASSESSMENT & PLAN NOTE
· CBC in ED revealed WBCs 18 8, ANC 16 08  · WBC improved to 9 55 this AM   · No evidence of acute systemic infection- currently afebrile, VSS  · Likely 2/2 systemic stress response to acute overdose  · Resolved   · Routine monitoring No

## 2023-02-17 NOTE — ASSESSMENT & PLAN NOTE
· H/o stage IIB right breast cancer s/p right breast mastectomy  · Completed chemo 2014  · Currently follows with outpatient heme/onc- most recent appt 11/10/2022  · Currently on anastrozole 1 mg daily  · Continue anastrozole   · Recommend ongoing routine outpatient follow-up heme-onc

## 2023-02-17 NOTE — ASSESSMENT & PLAN NOTE
· H/o anxiety/depression  · Currently follows with PCP ([facility])  · Current home regimen bupropion 75 mg daily and buspirone 10 mg BID  · Currently denies SI/thoughts of self-harm  · Reports opioid OD was accidental  · Continue home regimen  · Reports she has appt with outpatient psychiatry next month  · Recommend outpatient f/u PCP, psychiatry

## 2023-02-17 NOTE — PLAN OF CARE
Problem: SUBSTANCE USE/ABUSE  Goal: By discharge, will develop insight into their chemical dependency and sustain motivation to continue in recovery  Description: INTERVENTIONS:  - Attends all daily group sessions and scheduled AA groups  - Actively practices coping skills through participation in the therapeutic community and adherence to program rules  - Reviews and completes assignments from individual treatment plan  - Assist patient development of understanding of their personal cycle of addiction and relapse triggers  Outcome: Progressing  Goal: By discharge, patient will have ongoing treatment plan addressing chemical dependency  Description: INTERVENTIONS:  - Assist patient with resources and/or appointments for ongoing recovery based living  Outcome: Progressing     Problem: PAIN - ADULT  Goal: Verbalizes/displays adequate comfort level or baseline comfort level  Description: Interventions:  - Encourage patient to monitor pain and request assistance  - Assess pain using appropriate pain scale  - Administer analgesics based on type and severity of pain and evaluate response  - Implement non-pharmacological measures as appropriate and evaluate response  - Consider cultural and social influences on pain and pain management  - Notify physician/advanced practitioner if interventions unsuccessful or patient reports new pain  Outcome: Progressing     Problem: INFECTION - ADULT  Goal: Absence or prevention of progression during hospitalization  Description: INTERVENTIONS:  - Assess and monitor for signs and symptoms of infection  - Monitor lab/diagnostic results  - Monitor all insertion sites, i e  indwelling lines, tubes, and drains  - Monitor endotracheal if appropriate and nasal secretions for changes in amount and color  - [place] appropriate cooling/warming therapies per order  - Administer medications as ordered  - Instruct and encourage patient and family to use good hand hygiene technique  - Identify and instruct in appropriate isolation precautions for identified infection/condition  Outcome: Progressing  Goal: Absence of fever/infection during neutropenic period  Description: INTERVENTIONS:  - Monitor WBC    Outcome: Progressing     Problem: SAFETY ADULT  Goal: Patient will remain free of falls  Description: INTERVENTIONS:  - Educate patient/family on patient safety including physical limitations  - Instruct patient to call for assistance with activity   - Consult OT/PT to assist with strengthening/mobility   - Keep Call bell within reach  - Keep bed low and locked with side rails adjusted as appropriate  - Keep care items and personal belongings within reach  - Initiate and maintain comfort rounds  - Make Fall Risk Sign visible to staff  - Apply yellow socks and bracelet for high fall risk patients  - Consider moving patient to room near nurses station  Outcome: Progressing  Goal: Maintain or return to baseline ADL function  Description: INTERVENTIONS:  -  Assess patient's ability to carry out ADLs; assess patient's baseline for ADL function and identify physical deficits which impact ability to perform ADLs (bathing, care of mouth/teeth, toileting, grooming, dressing, etc )  - Assess/evaluate cause of self-care deficits   - Assess range of motion  - Assess patient's mobility; develop plan if impaired  - Assess patient's need for assistive devices and provide as appropriate  - Encourage maximum independence but intervene and supervise when necessary  - Involve family in performance of ADLs  - Assess for home care needs following discharge   - Consider OT consult to assist with ADL evaluation and planning for discharge  - Provide patient education as appropriate  Outcome: Progressing  Goal: Maintains/Returns to pre admission functional level  Description: INTERVENTIONS:  - Perform BMAT or MOVE assessment daily    - Set and communicate daily mobility goal to care team and patient/family/caregiver     - Collaborate with rehabilitation services on mobility goals if consulted  - Out of bed for toileting  - Record patient progress and toleration of activity level   Outcome: Progressing     Problem: SAFETY ADULT  Goal: Patient will remain free of falls  Description: INTERVENTIONS:  - Educate patient/family on patient safety including physical limitations  - Instruct patient to call for assistance with activity   - Consult OT/PT to assist with strengthening/mobility   - Keep Call bell within reach  - Keep bed low and locked with side rails adjusted as appropriate  - Keep care items and personal belongings within reach  - Initiate and maintain comfort rounds  - Make Fall Risk Sign visible to staff  - Apply yellow socks and bracelet for high fall risk patients  - Consider moving patient to room near nurses station  Outcome: Progressing  Goal: Maintain or return to baseline ADL function  Description: INTERVENTIONS:  -  Assess patient's ability to carry out ADLs; assess patient's baseline for ADL function and identify physical deficits which impact ability to perform ADLs (bathing, care of mouth/teeth, toileting, grooming, dressing, etc )  - Assess/evaluate cause of self-care deficits   - Assess range of motion  - Assess patient's mobility; develop plan if impaired  - Assess patient's need for assistive devices and provide as appropriate  - Encourage maximum independence but intervene and supervise when necessary  - Involve family in performance of ADLs  - Assess for home care needs following discharge   - Consider OT consult to assist with ADL evaluation and planning for discharge  - Provide patient education as appropriate  Outcome: Progressing  Goal: Maintains/Returns to pre admission functional level  Description: INTERVENTIONS:  - Perform BMAT or MOVE assessment daily    - Set and communicate daily mobility goal to care team and patient/family/caregiver     - Collaborate with rehabilitation services on mobility goals if consulted  - Out of bed for toileting  - Record patient progress and toleration of activity level   Outcome: Progressing     Problem: DISCHARGE PLANNING  Goal: Discharge to home or other facility with appropriate resources  Description: INTERVENTIONS:  - Identify barriers to discharge w/patient and caregiver  - Arrange for needed discharge resources and transportation as appropriate  - Identify discharge learning needs (meds, wound care, etc )  - Arrange for interpretive services to assist at discharge as needed  - Refer to Case Management Department for coordinating discharge planning if the patient needs post-hospital services based on physician/advanced practitioner order or complex needs related to functional status, cognitive ability, or social support system  Outcome: Progressing     Problem: Knowledge Deficit  Goal: Patient/family/caregiver demonstrates understanding of disease process, treatment plan, medications, and discharge instructions  Description: Complete learning assessment and assess knowledge base    Interventions:  - Provide teaching at level of understanding  - Provide teaching via preferred learning methods  Outcome: Progressing

## 2023-02-17 NOTE — ASSESSMENT & PLAN NOTE
· H/o anxiety/depression  · Currently follows with PCP ([facility])  · Current home regimen bupropion 75 mg daily and buspirone 10 mg BID  · Currently denies SI/thoughts of self-harm  · Reports opioid OD was accidental  · Continue home regimen  · Reports she has appt with outpatient psychiatry next month  · Recommend outpatient f/u PCP, psychiatry

## 2023-02-17 NOTE — DISCHARGE SUMMARY
[facility]  Discharge- Judah Fess 1965, 59 y o  female MRN: [id number]  Unit/Bed#: 5T DETOX 872-46 Encounter: [id number]  Primary Care Provider: YULI Quiros   Date and time admitted to hospital: 2/16/2023  5:11 PM  MEDICAL DETOX UNIT, LEVEL 4  Department of Medical Toxicology  Reason for Admission/Principal Problem: opioid overdose-accidental, OUD  Admitting provider: YULI Arellano  No att  providers found   2/16/2023  5:11 PM       Discharging Physician / Practitioner: YULI Arellano  PCP: Candace Quiros  Admission Date:   Admission Orders (From admission, onward)     Ordered        02/17/23 1009  Inpatient Admission  Once            02/16/23 1820  Place in Observation  Once            02/16/23 1740  Inpatient Admission  Once                      Discharge Date: 02/18/23    Medical Problems     Resolved Problems  Date Reviewed: 2/17/2023          Resolved    * (Principal) Opioid overdose ([facility] ) 2/17/2023     Resolved by  Kerri Elias PA-C    Leukocytosis 2/17/2023     Resolved by  Kerri Elias PA-C    Hypokalemia 2/17/2023     Resolved by  Kerri Elias PA-C    Elevated AST (SGOT) 2/18/2023     Resolved by  YULI Arellano    Elevated serum creatinine 2/17/2023     Resolved by  Kerri Elias PA-C          Anemia  Assessment & Plan  · hgb mildly low this AM  · Baseline appears to be 11-12  · Continue routine monitoring     Anxiety and depression  Assessment & Plan  · H/o anxiety/depression  · Currently follows with PCP ([facility])  · Current home regimen bupropion 75 mg daily and buspirone 10 mg BID  · Currently denies SI/thoughts of self-harm  · Reports opioid OD was accidental  · Continue home regimen  · Recommend outpatient f/u PCP, psychiatry     Opioid use disorder, moderate, dependence ([facility] )  Assessment & Plan  Reports initially started snorting heroin around age 46 to cope with cancer-related pain  Denies h/o IVDU  Was stable on methadone x 7 yrs, followed with Arlin Dominguez in [facility]  Reports taper down x 6 months    Reports she was having intermittent cravings since stopping methadone completely- snorted 0 5 bag fentanyl yesterday AM resulting in overdose (see above)  · Further education regarding MAT this AM: patient now agreeable to Suboxone MAT   · Continue maintenance and follow with [facility]      Nicotine dependence  Assessment & Plan  Current every day smoker: 5-6 cigarettes daily   Encourage cessation  Offer nicotine replacement    Hypertension  Assessment & Plan  · H/o HTN  · Home regimen: HCTZ 25 mg daily, amlodipine 5 mg daily  · Most recent /93; preceding 24 hr range 123//91  · Continue home regimen  · Continue to monitor vitals, BP  · Recommend outpatient PCP f/u    Asthma  Assessment & Plan  · H/o asthma  · Outpatient inhaler regimen: symbicort BID, albuterol inhaler and nebulizer PRN  · Currently without evidence of acute exacerbation: VSS- afebrile, SpO2 95% RA  · Continue home inhaler regimen  · Outpatient f/u PCP    History of cancer of right breast  Assessment & Plan  · H/o stage IIB right breast cancer s/p right breast mastectomy  · Completed chemo 2014  · Currently follows with outpatient heme/onc- most recent appt 11/10/2022  · Currently on anastrozole 1 mg daily  · Continue anastrozole   · Recommend ongoing routine outpatient follow-up heme-onc    Elevated AST (SGOT)-resolved as of 2/18/2023  Assessment & Plan  · AST mildly elevated on initial CMP- improved this AM   · No acute abdominal pain on exam  · Denies h/o IVDU, no prior h/o hepatitis   · Check acute hepatitis panel- normal  · Routine outpatient monitoring       Consultations During Hospital Stay:  · CM    Procedures Performed:   · None     Significant Findings / Test Results:   · Leukocytosis  · Hypokalemia  · Elevated AST  · VBG initially with evidence of CO2 retention - has since resolved on repeat labs    Incidental Findings:   · None    Test Results Pending at Discharge (will require follow up): · Acute hepatitis panel     Outpatient Tests / Follow Up Requested:  · Recommend follow-up with PCP within 1 week after discharge    Complications: None    Reason for Admission: opioid overdose-accidental, OUD    Hospital Course:     Gabo Pathak is a 59 y o  female patient who originally presented to the hospital on 2/16/2023 after being found unresponsive in her home and being given naloxone; in the ED she required continuous naloxone infusion secondary to recurrent respiratory depression and apnea  She was subsequently admitted to the Medical Detox Unit for medically assisted opioid withdrawal  She initially reported one time use of fentanyl after a history of being on methadone for 7 years and being taperd off  Later during admission patient reported more continuous use due to cravings  On 2/17/23, patient underwent and tolerated microinduction with transition to maintenance 4/1mg BID  Upon discharge she reported feeling well and further explained her use history over the past several months  Stating she was able to maintain sobriety on full agonist treatment  Suboxone was increased to 8/2mg BID dosing with planned follow up at [facility]  Case management was consulted for assistance with disposition planning, and patient was discharged to with outpatient follow up  Please see above list of diagnoses and related plan for additional information  Condition at Discharge: good     Discharge Day Visit / Exam:     Subjective:  Patient resting comfortably  Denies any acute complaints     Vitals: Blood Pressure: 160/94 (02/18/23 0700)  Pulse: 69 (02/18/23 0700)  Temperature: 97 7 °F (36 5 °C) (02/18/23 0700)  Temp Source: Temporal (02/18/23 0700)  Respirations: 16 (02/18/23 0700)  Height: 5' 2" (157 5 cm) (02/16/23 1736)  Weight - Scale: 68 5 kg (151 lb) (02/16/23 1736)  SpO2: 96 % (02/18/23 0700)  Exam:   Physical Exam  Vitals and nursing note reviewed  Constitutional:       General: She is not in acute distress  Appearance: She is well-developed and normal weight  HENT:      Head: Normocephalic and atraumatic  Eyes:      Extraocular Movements: Extraocular movements intact  Conjunctiva/sclera: Conjunctivae normal    Cardiovascular:      Rate and Rhythm: Normal rate and regular rhythm  Pulses: Normal pulses  Pulmonary:      Effort: Pulmonary effort is normal  No respiratory distress  Abdominal:      General: Abdomen is flat  Palpations: Abdomen is soft  Tenderness: There is no abdominal tenderness  Musculoskeletal:         General: Normal range of motion  Cervical back: Normal range of motion  Skin:     General: Skin is warm and dry  Neurological:      General: No focal deficit present  Mental Status: She is alert and oriented to person, place, and time  Motor: No tremor  Psychiatric:         Attention and Perception: Attention normal          Mood and Affect: Mood normal          Speech: Speech normal          Behavior: Behavior is cooperative  Thought Content: Thought content does not include suicidal ideation  Thought content does not include suicidal plan  Discussion with Family: I personally did not discuss this case with the patient's family  I reviewed the discharge plan with the patient and answered all questions to the best of my ability  Discharge instructions/Information to patient and family:   See after visit summary for information provided to patient and family  Provisions for Follow-Up Care:  See after visit summary for information related to follow-up care and any pertinent home health orders        Disposition:     Home    For Discharges to [facility] SNF:   · Not Applicable to this Patient - Not Applicable to this Patient    Planned Readmission: N/A     Discharge Statement:  I spent 35 minutes discharging the patient  This time was spent on the day of discharge  I had direct contact with the patient on the day of discharge  Greater than 50% of the total time was spent examining patient, answering all patient questions, arranging and discussing plan of care with patient as well as directly providing post-discharge instructions  Additional time then spent on discharge activities  Discharge Medications:  See after visit summary for reconciled discharge medications provided to patient and family        ** Please Note: This note has been constructed using a voice recognition system **

## 2023-02-17 NOTE — ASSESSMENT & PLAN NOTE
· Creatinine 1 05 on admission  · Unclear baseline 2/2 limited data on chart review; recent values 0 9-1 1  · Creatinine improved to 0 73 this AM with IVFs  · Routine monitoring  · Encourage adequate hydration

## 2023-02-17 NOTE — PROGRESS NOTES
[facility]  Progress Note - Kareem Persaud 1965, 59 y o  female MRN: [id number]  Unit/Bed#: 5T DETOX 513-01 Encounter: [id number]  Primary Care Provider: YULI Saldana   Date and time admitted to hospital: 2/16/2023  5:11 PM    MEDICAL DETOX UNIT, LEVEL 4  Department of Medical Toxicology  Reason for Admission/Principal Problem: opioid overdose  Rounding Provider: Damian Greenwood PA-C, German Overton MD     * Opioid overdose (HCC)-resolved as of 2/17/2023  Assessment & Plan  · Presented to [facility] ED via EMS morning of 2/16/2023 after being found unresponsive at home; EMS administered narcan   · Patient received additional narcan in the ED and was started on narcan infusion PTA  · Upon admission- Patient A&Ox4, VSS  · Reports she had been clean off opioids x 7 yrs, recently stopped methadone on 2/5  · Reports one-time use of fentanyl yesterday AM  · Reports OD as accidental- currently denies SI/HI/thoughts of self-harm  · Narcan infusion was continued on admission  · VBG on admission revealed mild CO2 retention (otherwise pH stable)  · Repeat VBG showed resolution of CO2 retention; thus narcan infusion was discontinued   · Currently no evidence of acute opioid withdrawal  · Currently no evidence of further toxicological affects from opioid overdose- resolved   · Continue to monitor with supportive care     Opioid use disorder, moderate, dependence (HCC)  Assessment & Plan  Reports initially started snorting heroin around age 46 to cope with cancer-related pain  · Denies h/o IVDU  Used x 2 yrs until started on methadone   Was stable on methadone x 7 yrs, followed with [facility] in [place]  · Reports taper down x 6 months  · Last reported dose 2/5/2023  · CM called [facility] this AM- confirmed last dose Methadone 1 mg administered 2/5/2023  Reports she was having intermittent cravings since stopping methadone completely- snorted 0 5 bag fentanyl yesterday AM resulting in overdose (see above)  · Reports use yesterday was isolated incident  · Notes she otherwise has not used since starting methadone 7 yrs ago   · Notes she tried suboxone previously (off the streets) but did not like it   · Further education regarding MAT this AM: patient now agreeable to Suboxone MAT   · Not currently exhibiting acute opioid withdrawal- 1 isolated use yesterday AM, received narcan  · Will proceed with MAT induction- order 0 5 mg Subutex test dose, followed by 1 mg Suboxone q 2h x 4 doses if test dose tolerated   · Given limited use- will plan for maintenance dosing to be Suboxone 4 mg BID   · Case management consulted for assistance with disposition planning- to f/u with [facility]     Leukocytosis-resolved as of 2/17/2023  Assessment & Plan  · CBC in ED revealed WBCs 18 8, ANC 16 08  · WBC improved to 9 55 this AM   · No evidence of acute systemic infection- currently afebrile, VSS  · Likely 2/2 systemic stress response to acute overdose  · Resolved   · Routine monitoring     Hypokalemia-resolved as of 2/17/2023  Assessment & Plan  · Improved following supplementation   · Routine electrolyte monitoring     Elevated AST (SGOT)  Assessment & Plan  · AST mildly elevated on initial CMP- improved this AM   · No acute abdominal pain on exam  · Denies h/o IVDU, no prior h/o hepatitis   · Check acute hepatitis panel- pending   · Routine outpatient monitoring     Anemia  Assessment & Plan  · hgb mildly low this AM  · Baseline appears to be 11-12  · Likely dilutional as decrease seen in all lines   · Continue to monitor     Anxiety and depression  Assessment & Plan  · H/o anxiety/depression  · Currently follows with PCP ([facility])  · Current home regimen bupropion 75 mg daily and buspirone 10 mg BID  · Currently denies SI/thoughts of self-harm  · Reports opioid OD was accidental  · Continue home regimen  · Reports she has appt with outpatient psychiatry next month  · Recommend outpatient f/u PCP, psychiatry     Nicotine dependence  Assessment & Plan  Current every day smoker: 5-6 cigarettes daily   Encourage cessation  Offer nicotine replacement    Hypertension  Assessment & Plan  · H/o HTN  · Home regimen: HCTZ 25 mg daily, amlodipine 5 mg daily  · Most recent /93; preceding 24 hr range 123//91  · Continue home regimen  · Continue to monitor vitals, BP  · Recommend outpatient PCP f/u    Asthma  Assessment & Plan  · H/o asthma  · Outpatient inhaler regimen: symbicort BID, albuterol inhaler and nebulizer PRN  · Currently without evidence of acute exacerbation: VSS- afebrile, SpO2 95% RA  · Continue home inhaler regimen  · Continue to monitor vitals  · Outpatient f/u PCP    History of cancer of right breast  Assessment & Plan  · H/o stage IIB right breast cancer s/p right breast mastectomy  · Completed chemo 2014  · Currently follows with outpatient heme/onc- most recent appt 11/10/2022  · Currently on anastrozole 1 mg daily  · Continue anastrozole   · Recommend ongoing routine outpatient follow-up heme-onc    Elevated serum creatinine-resolved as of 2/17/2023  Assessment & Plan  · Creatinine 1 05 on admission  · Unclear baseline 2/2 limited data on chart review; recent values 0 9-1 1  · Creatinine improved to 0 73 this AM with IVFs  · Routine monitoring  · Encourage adequate hydration       VTE Pharmacologic Prophylaxis:   Pharmacologic: Enoxaparin (Lovenox)  Mechanical VTE Prophylaxis in Place: yes    Code Status: Level 1 - Full Code    Patient Centered Rounds: I have performed bedside rounds with nursing staff today  Discussions with Specialists or Other Care Team Provider: Dr Kristin Delgado, 1980 [facility]   Education and Discussions with Family / Patient: I personally did not discuss patient with family at this time  Discussed current plan with patient, answered all questions to best of my ability  Time Spent for Care: 20 minutes    More than 50% of total time spent on counseling and coordination of care as described above  Current Length of Stay: 1 day(s)    Current Patient Status: Inpatient     Certification Statement: The patient will continue to require additional inpatient hospital stay due to suboxone induction today  Discharge Plan: likely discharge in 24 hrs       Total time spent today 20 minutes  Greater than 50% of total time was spent with the patient and / or family counseling and / or coordination of care  A description of the counseling / coordination of care: OUD, suboxone MAT    Subjective:   Patient seen and examined bedside this morning, reports that she is feeling good this morning, denies acute complaints  Currently denies headaches, lightheadedness/dizziness, coughing/sneezing/congestion/rhinorrhea, chest pain, SOB/dyspnea, abdominal pain, N/V/C, dysuria/hematuria, hallucinations  After further education, patient is now interested in Suboxone MAT       Objective:     Clinical Opiate Withdrawal Scale  Pulse: 83  Resting Pulse Rate: Measured After Patient is Sitting or Lying for One Minute: Pulse rate 80 or below  GI Upset: Over Last Half Hour: No GI symptoms  Sweating: Over Past Half Hour Not Accounted for by Room Temperature of Patient Activity: No report of chills or flushing  Tremor: Observation of Outstretched Hands: No tremor  Restlessness: Observation During Assessment: Able to sit still  Yawning: Observation During Assessment: No yawning  Pupil Size: Pupils pinned or normal size for room light  Anxiety and Irritability: None  Bone or Joint Aches: If Patient was Having Pain Previously, Only the Additional Component Attributed to Opiate Withdrawal is Scored: Not present  Gooseflesh Skin: Skin is smooth  Runny Nose or Tearing: Not Accounted for by Cold Symptoms or Allergies: Not present  Clinical Opiate Withdrawal Scale Total Score: 0    No data recorded      Last 24 Hours Medication List:   Current Facility-Administered Medications   Medication Dose Route Frequency Provider Last Rate   • acetaminophen  650 mg Oral Q6H PRN Alayna Elias PA-C     • albuterol  2 puff Inhalation Q6H PRN Alayna Elias PA-C     • albuterol  2 5 mg Nebulization Q6H PRN TONG Duvall-CARIDAD     • amLODIPine  5 mg Oral Daily Alayna Elias PA-C     • anastrozole  1 mg Oral Daily Alayna Elias PA-C     • aspirin  81 mg Oral Daily Alayna Elias PA-C     • budesonide-formoterol  2 puff Inhalation BID Alayna Elias PA-C     • busPIRone  10 mg Oral BID Alayna Elias PA-C     • cloNIDine  0 1 mg Oral Q6H PRN Alayna Elias PA-C     • enoxaparin  40 mg Subcutaneous Daily Alayna Elias PA-C     • gabapentin  300 mg Oral Q8H PRN Alayna Elias PA-C     • hydrochlorothiazide  25 mg Oral Daily Alayna Elias PA-C     • multivitamin-minerals  1 tablet Oral Daily Alayna Elias PA-C     • nicotine  14 mg Transdermal Daily Alayna Elias PA-C     • ondansetron  4 mg Intravenous Q6H PRN Alayna Elias PA-C     • pantoprazole  40 mg Oral Early Morning Alayna Elias PA-C           Vitals:   Temp (24hrs), Av 4 °F (36 3 °C), Min:97 2 °F (36 2 °C), Max:97 6 °F (36 4 °C)    Temp:  [97 2 °F (36 2 °C)-97 6 °F (36 4 °C)] 97 4 °F (36 3 °C)  HR:  [] 83  Resp:  [16-18] 16  BP: (123-159)/(69-93) 153/93  SpO2:  [93 %-99 %] 95 %  Body mass index is 27 62 kg/m²  Input and Output Summary (last 24 hours):No intake or output data in the 24 hours ending 23 1100    Physical Exam:   Physical Exam  Vitals and nursing note reviewed  Constitutional:       General: She is not in acute distress  Appearance: Normal appearance  She is well-developed and normal weight  She is not ill-appearing or diaphoretic  HENT:      Head: Normocephalic and atraumatic  Eyes:      General: No scleral icterus  Extraocular Movements: Extraocular movements intact        Conjunctiva/sclera: Conjunctivae normal  Pupils: Pupils are equal, round, and reactive to light  Cardiovascular:      Rate and Rhythm: Normal rate and regular rhythm  Pulses: Normal pulses  Heart sounds: Normal heart sounds  No murmur heard  No friction rub  No gallop  Pulmonary:      Effort: Pulmonary effort is normal  No respiratory distress  Breath sounds: Normal breath sounds  No wheezing, rhonchi or rales  Abdominal:      General: Abdomen is flat  Bowel sounds are normal  There is no distension  Palpations: Abdomen is soft  Tenderness: There is no abdominal tenderness  There is no guarding  Musculoskeletal:         General: Normal range of motion  Cervical back: Normal range of motion  Skin:     General: Skin is warm and dry  Capillary Refill: Capillary refill takes less than 2 seconds  Comments: No piloerection    Neurological:      General: No focal deficit present  Mental Status: She is alert and oriented to person, place, and time  Mental status is at baseline  Motor: No tremor  Psychiatric:         Attention and Perception: Attention normal          Mood and Affect: Mood normal          Speech: Speech normal          Behavior: Behavior is cooperative  Thought Content: Thought content does not include suicidal ideation  Thought content does not include suicidal plan           Additional Data:     Labs: keep all most recent labs as listed on admission templates   Results from last 7 days   Lab Units 02/17/23  0509 02/16/23  1309   WBC Thousand/uL 9 55 18 80*   HEMOGLOBIN g/dL 10 9* 12 3   HEMATOCRIT % 32 8* 36 7   PLATELETS Thousands/uL 261 324   NEUTROS PCT %  --  85*   LYMPHS PCT %  --  9*   MONOS PCT %  --  5   EOS PCT %  --  0      Results from last 7 days   Lab Units 02/17/23  0509   SODIUM mmol/L 137   POTASSIUM mmol/L 3 7   CHLORIDE mmol/L 103   CO2 mmol/L 29   BUN mg/dL 19   CREATININE mg/dL 0 73   ANION GAP mmol/L 5   CALCIUM mg/dL 8 7   ALBUMIN g/dL 3 5   TOTAL BILIRUBIN mg/dL 0 33   ALK PHOS U/L 85   ALT U/L 35*   AST U/L 38*   GLUCOSE RANDOM mg/dL 100*          * I Have Reviewed All Lab Data Listed Above  * Additional Pertinent Lab Tests Reviewed: All Labs Within Last 24 Hours Reviewed      Imaging Studies: I have personally reviewed pertinent reports  Recent Cultures (last 7 days): Today, Patient Was Seen By: Ruslan Mckeon PA-C    ** Please Note: Dictation voice to text software may have been used in the creation of this document   **

## 2023-02-17 NOTE — ASSESSMENT & PLAN NOTE
· H/o HTN  · Home regimen: HCTZ 25 mg daily, amlodipine 5 mg daily  · Most recent /93; preceding 24 hr range 123//91  · Continue home regimen  · Continue to monitor vitals, BP  · Recommend outpatient PCP f/u

## 2023-02-17 NOTE — PROGRESS NOTES
02/17/23 0847   Referral Data   Referral Source Family   Referral Name [facility] ED   Referral Reason Drug/Alcohol [facility]   Readmission Root Cause   30 Day Readmission No   Patient Information   Mental Status Alert   Primary Caregiver Self   Support System Immediate family   Denominational/Cultural Requests n/a   Legal Information   Legal Issues pt denies   Activities of Daily Living Prior to Admission   Functional Status Independent   Assistive Device No device needed   Living Arrangement Lives with someone; Apartment   Ambulation Independent   Access to Firearms   Access to Firearms No  (pt denies)   Income Information   Income Source SSI/SSD  ($900/month)   Means of Transportation   Means of Transport to Appts: Family transport     Pt is a 59year old female that was admitted to detox after an opioid overdose  Pt had presented at [facility] ED after an overdose and receiving narcan from family  Pt's name, date of birth, home address, and telephone number were verified  Pt was informed of case management role and the purpose of the completion of intake with case management  Pt presented as cooperative during intake  Pt reports she had used fentanyl one time, 2/16/2023, intranasally, half a bag, within the last 7 years  Pt reports she had been on methadone maintenance for the last 7 years with last dose on 2/5/2023 of 1 mg  Pt states she had requested to be tapered off methadone, stating she no longer wanted to attend methadone maintenance  Pt reports she had an active heroin addiction starting at age 46 and lasting 3 years  Pt reports daily nicotine use of  5 PPD cigarette smoking  Cm offered referral to smoking/nicotine cessation program  Pt states no interest in stopping cigarette smoking  Pt reports her only treatment was methadone maintenance and had been able to maintain abstinence for 7 years   Pt denied any current withdrawal symptoms and reports that this overdose was her only overdose  Pt denied any family hx of substance abuse    AUDIT: no score  PAWSS: 0  UDS: not completed  Ethanol: <3    Pt reports a diagnosis of bipolar disorder and reports previous outpatient and inpatient treatment  Pt reports she had been attending an outpatient provider but this provider shut down  Pt states she believes she has an upcoming appointment with her medical provider for psychiatric care  Pt reports a previous inpatient mental health treatment over 20 years ago  Pt denied any current SI or HI, denied AH/VH, denied hx of abuse or trauma, denied any recent losses, denied any access to firearms, denied family hx of mental health needs  Pt has current medical condition of hypertension and a hx of breast cancer  Pt reports a PCP of [facility], [address] [place], [place] [address] location, ROB signed  Cm contacted this location at [phone] with no response  A message was left requesting a return call  Pt has current medical insurance of [facility], ROB signed, and preferred pharmacy of [facility]  Pt signed IMM notification  Pt denied any current legal issues  Pt reports she receives SSI/SSD benefits in the amount of $900/month  Pt reports an education to the 10th grade  Pt reports she relies on family for transportation needs  Pt denied any  service and denied any religous or cultural request     Pt reports she resides with a roommate in an apartment  Pt states she will be returning to her daughter's home after discharge for support  Pt signed an ROB for Jen Marina, daughter, and she was contacted at [phone]  Karyle Malta was provided with an update and a discussion was had regarding pt follow up  Karyle Malta stated she would provide pt with transportation home  Pt and Cm completed relapse prevention plan  Pt and Cm signed plan and pt received a copy of this plan    Pt stated she felt her withdrawal from the methadone caused this relapse but struggled to identify any coping skills  Initially, pt was not receptive to any follow up, and struggled to recognize her need for support  Cm processed with pt what had occurred and pt indicated she was agreeable to MAT and CM at [facility] program  Pt signed an ROB for [facility] program and was scheduled  Pt's clinical presentation indicates pt struggles with understanding her relapse potential and has not developed any coping skills other than MAT treatment  Pt's barriers appear to be her long term use of MAT, co-occurring disorder needs, and lack of community relapse prevention support  Pt's goals for detox are to successfully transition to suboxone MAT and to develop a discharge plan that includes skill development for relapse prevention  Pt presents in the contemplation stage of change

## 2023-02-17 NOTE — ASSESSMENT & PLAN NOTE
· K+ 3 4 on admission  · Administer supplementation  · Continue to monitor electrolytes and supplement as indicated

## 2023-02-17 NOTE — UTILIZATION REVIEW
Initial Clinical Review    Observation 2/16 1820 changed to inpatient 2/17 1009  Pt requiring continued stay for monitoring of toleration of suboxone initiation  Admission: Date/Time/Statement:   Admission Orders (From admission, onward)     Ordered        02/17/23 1009  Inpatient Admission  Once            02/16/23 1820  Place in Observation  Once            02/16/23 1740  Inpatient Admission  Once                      Orders Placed This Encounter   Procedures   • Inpatient Admission     Standing Status:   Standing     Number of Occurrences:   1     Order Specific Question:   Level of Care     Answer:   Level 2 Stepdown / HOT [14]     Order Specific Question:   Estimated length of stay     Answer:   More than 2 Midnights     Order Specific Question:   Certification     Answer:   I certify that inpatient services are medically necessary for this patient for a duration of greater than two midnights  See H&P and MD Progress Notes for additional information about the patient's course of treatment  ED Arrival Information     Patient not seen in ED                     No chief complaint on file  Initial Presentation: 59 y o  female with PMH OUD previously on methadone maintenance until 8 days ago, h/o breast cancer s/p right mastectomy, HTN, anxiety/depression who presents with opioid overdose  Originally presented to [facility] ED earlier today via EMS after being found unresponsive at home  Patient received narcan in field prior to ED presentation with positive response  Patient received additional dose of narcan in ED and was started on narcan infusion prior to transfer  Subsequently transferred to [address] [place] [address] detox unit for further management  On admission, patient appeared overall stable- alert and oriented and not exhibiting acute opioid withdrawal symptoms   Patient reported that she initially started snorting heroin age 46 due to cancer-related pain, use for 2 yrs, then transitioned to methadone maintenance therapy  Reports she was following with [facility] in [place], weaned down over 6 months with last dose 2/5/2023  Reports she was having intermittent cravings since stopping methadone treatment and had isolated one-time use of snorting fentanyl this AM  Reports overdose was an accident, was not trying to harm self  Currently states she does not intend to ever use again  States she tried suboxone previously and did not like it  Currently declines all MAT, states she "just wants to stop all drugs "  Plan: Observation for opioid overdose, leukocytosis, hypokalemia, elevated AST, anxiety/depression, HTN, right breast cancer: continue narcan infusion, replete potassium and monitor, check acute hepatitis panel, continue home bupropion and buspirone, continue HCTZ and amlodipine, continue anastrazole  2/17 Observation changed to inpatient  Medical Toxicology: Further education regarding MAT this AM: patient now agreeable to Suboxone MAT  Not currently exhibiting acute opioid withdrawal- 1 isolated use yesterday AM, received narcan  Will proceed with MAT induction- order 0 5 mg Subutex test dose, followed by 1 mg Suboxone q 2h x 4 doses if test dose tolerated  Given limited use- will plan for maintenance dosing to be Suboxone 4 mg BID  Acute hepatitis panel pending  Continue home bupropion and buspirone, continue HCTZ and amlodipine, continue anastrazole       Date: 2/18 Day 2:    Medical Toxicology: Pt to be discharged home today with outpatient substance use treatment follow up at [facility]        ED Triage Vitals   Temperature Pulse Respirations Blood Pressure SpO2   02/16/23 1720 02/16/23 1720 02/16/23 1720 02/16/23 1720 02/16/23 1720   97 6 °F (36 4 °C) 66 18 123/72 95 %      Temp Source Heart Rate Source Patient Position - Orthostatic VS BP Location FiO2 (%)   02/16/23 1720 02/16/23 1950 02/16/23 1720 02/16/23 1720 --   Temporal Monitor Lying Left arm       Pain Score       02/16/23 1728       9          Wt Readings from Last 1 Encounters:   02/16/23 68 5 kg (151 lb)     Additional Vital Signs:     Date/Time Temp Pulse Resp BP MAP (mmHg) SpO2 O2 Device   02/18/23 0700 97 7 °F (36 5 °C) 69 16 160/94 116 96 % None (Room air)   02/17/23 2005 97 2 °F (36 2 °C) Abnormal  75 16 152/80 104 96 % None (Room air)   02/17/23 1601 98 °F (36 7 °C) 60 16 122/44 Abnormal  -- -- --   02/17/23 1100 97 6 °F (36 4 °C) 60 16 140/78 -- 96 % None (Room air)   02/17/23 0745 97 4 °F (36 3 °C) Abnormal  83 16 153/93 -- 95 % None (Room air)   02/17/23 0500 97 2 °F (36 2 °C) Abnormal  79 18 159/91 -- 95 % None (Room air)   02/17/23 0044 97 2 °F (36 2 °C) Abnormal  71 18 123/71 -- 98 % None (Room air)   02/16/23 1950 97 2 °F (36 2 °C) Abnormal  71 18 130/76 -- 95 % None (Room air)   02/16/23 1730 -- 66 -- -- -- -- --     Pertinent Labs/Diagnostic Test Results:     2/16 Repeat EKG:  Normal sinus rhythm  Normal ECG  When compared with ECG of 16-FEB-2023 12:50,  No significant change was found    2/16 EKG:  Junctional rhythm  ST elevation, consider early repolarization, pericarditis, or injury  Abnormal ECG  When compared with ECG of 16-FEB-2023 11:30, (unconfirmed)  Significant changes have occurred      Results from last 7 days   Lab Units 02/17/23  0509 02/16/23  1309   WBC Thousand/uL 9 55 18 80*   HEMOGLOBIN g/dL 10 9* 12 3   HEMATOCRIT % 32 8* 36 7   PLATELETS Thousands/uL 261 324   NEUTROS ABS Thousands/µL  --  16 08*         Results from last 7 days   Lab Units 02/17/23  0509 02/16/23  1309   SODIUM mmol/L 137 137   POTASSIUM mmol/L 3 7 3 4*   CHLORIDE mmol/L 103 104   CO2 mmol/L 29 29   ANION GAP mmol/L 5 4   BUN mg/dL 19 22   CREATININE mg/dL 0 73 1 05   EGFR ml/min/1 73sq m 90 58   CALCIUM mg/dL 8 7 9 2   MAGNESIUM mg/dL 1 9  --      Results from last 7 days   Lab Units 02/17/23  0509 02/16/23  1309   AST U/L 38* 56*   ALT U/L 35* 58   ALK PHOS U/L 85 105   TOTAL PROTEIN g/dL 6 7 7 4   ALBUMIN g/dL 3 5 3 7   TOTAL BILIRUBIN mg/dL 0 33 0 15*         Results from last 7 days   Lab Units 02/17/23  0509 02/16/23  1309   GLUCOSE RANDOM mg/dL 100* 198*           Results from last 7 days   Lab Units 02/16/23  2356 02/16/23  1827   PH GUILHERME  7 435* 7 332   PCO2 GUILHERME mm Hg 38 7* 55 7*   PO2 GUILHERME mm Hg 174 7* 54 3*   HCO3 GUILHERME mmol/L 25 4 28 8   BASE EXC GUILHERME mmol/L 1 2 1 9   O2 CONTENT GUILHERME ml/dL 15 1 14 9   O2 HGB, VENOUS % 96 8* 83 9*             Results from last 7 days   Lab Units 02/16/23  1545 02/16/23  1309   HS TNI 0HR ng/L  --  32   HS TNI 2HR ng/L 29  --    HSTNI D2 ng/L -3  --            Results from last 7 days   Lab Units 02/16/23  1309   ETHANOL LVL mg/dL <3   ACETAMINOPHEN LVL ug/mL <2*   SALICYLATE LVL mg/dL <3*         ED Treatment:   Medication Administration - No Administrations Displayed (No Start Event Found)     None        Past Medical History:   Diagnosis Date   • Asthma    • Breast cancer ([place] [address] )    • Cancer ([place] [address] )     breast cancer   • History of chemotherapy    • Hypertension    • Psychiatric disorder     depression and anxiety     Present on Admission:  • Asthma  • Hypertension  • Opioid use disorder, moderate, dependence (HCC)  • Nicotine dependence  • (Resolved) Hypokalemia  • Anxiety and depression  • Elevated AST (SGOT)  • (Resolved) Elevated serum creatinine      Admitting Diagnosis: Opioid overdose ([place] [address] ) [T40 2X1A]  Age/Sex: 59 y o  female  Admission Orders:  Scheduled Medications:  amLODIPine, 5 mg, Oral, Daily  anastrozole, 1 mg, Oral, Daily  aspirin, 81 mg, Oral, Daily  budesonide-formoterol, 2 puff, Inhalation, BID  busPIRone, 10 mg, Oral, BID  enoxaparin, 40 mg, Subcutaneous, Daily  hydrochlorothiazide, 25 mg, Oral, Daily  multivitamin-minerals, 1 tablet, Oral, Daily  nicotine, 14 mg, Transdermal, Daily  pantoprazole, 40 mg, Oral, Early Morning      Continuous IV Infusions:     PRN Meds:  acetaminophen, 650 mg, Oral, Q6H PRN  albuterol, 2 puff, Inhalation, Q6H PRN  albuterol, 2 5 mg, Nebulization, Q6H PRN  cloNIDine, 0 1 mg, Oral, Q6H PRN  gabapentin, 300 mg, Oral, Q8H PRN  ondansetron, 4 mg, Intravenous, Q6H PRN        IP CONSULT TO CASE MANAGEMENT    Network Utilization Review Department  ATTENTION: Please call with any questions or concerns to [phone] and carefully listen to the prompts so that you are directed to the right person  All voicemails are confidential   Abdullahi Stinson all requests for admission clinical reviews, approved or denied determinations and any other requests to dedicated fax number below belonging to the campus where the patient is receiving treatment   List of dedicated fax numbers for the Facilities:  [address] DENIALS (Administrative/Medical Necessity) [phone]   [address] (Maternity/NICU/Pediatrics) [phone]   910 Faby Haynes [phone]   Naomi Valencia  [phone]   1305 [place] [address] Edgar Cosme 28 [phone]   155 [facility] 134 815 [facility] [phone]

## 2023-02-17 NOTE — ASSESSMENT & PLAN NOTE
· H/o HTN  · Home regimen: HCTZ 25 mg daily, amlodipine 5 mg daily  · BP on admission 123/72  · Continue home regimen  · Continue to monitor vitals, BP  · Recommend outpatient PCP f/u

## 2023-02-17 NOTE — ASSESSMENT & PLAN NOTE
· AST mildly elevated on initial CMP- improved this AM   · No acute abdominal pain on exam  · Denies h/o IVDU, no prior h/o hepatitis   · Check acute hepatitis panel- pending   · Routine outpatient monitoring

## 2023-02-17 NOTE — ASSESSMENT & PLAN NOTE
Reports initially started snorting heroin around age 46 to cope with cancer-related pain  · Denies h/o IVDU  Used x 2 yrs until started on methadone   Was stable on methadone x 7 yrs, followed with [facility] in [place]  · Reports taper down x 6 months  · Last reported dose 2/5/2023  · CM called [facility] this AM- confirmed last dose Methadone 1 mg administered 2/5/2023  Reports she was having intermittent cravings since stopping methadone completely- snorted 0 5 bag fentanyl yesterday AM resulting in overdose (see above)  · Reports use yesterday was isolated incident  · Notes she otherwise has not used since starting methadone 7 yrs ago   · Notes she tried suboxone previously (off the streets) but did not like it   · Further education regarding MAT this AM: patient now agreeable to Suboxone MAT   · Not currently exhibiting acute opioid withdrawal- 1 isolated use yesterday AM, received narcan  · Will proceed with MAT induction- order 0 5 mg Subutex test dose, followed by 1 mg Suboxone q 2h x 4 doses if test dose tolerated   · Given limited use- will plan for maintenance dosing to be Suboxone 4 mg BID   · Case management consulted for assistance with disposition planning- to f/u with [facility]

## 2023-02-17 NOTE — ASSESSMENT & PLAN NOTE
· H/o asthma  · Outpatient inhaler regimen: symbicort BID, albuterol inhaler and nebulizer PRN  · Currently without evidence of acute exacerbation: VSS- afebrile, SpO2 95% RA  · Continue home inhaler regimen  · Continue to monitor vitals  · Outpatient f/u PCP

## 2023-02-17 NOTE — ASSESSMENT & PLAN NOTE
· hgb mildly low this AM  · Baseline appears to be 11-12  · Likely dilutional as decrease seen in all lines   · Continue to monitor

## 2023-02-17 NOTE — ASSESSMENT & PLAN NOTE
· Presented to [facility] ED via EMS morning of 2/16/2023 after being found unresponsive at home; EMS administered narcan   · Patient received additional narcan in the ED and was started on narcan infusion PTA  · Upon admission- Patient A&Ox4, VSS  · Reports she had been clean off opioids x 7 yrs, recently stopped methadone on 2/5  · Reports one-time use of fentanyl this AM  · Reports OD as accidental- currently denies SI/HI/thoughts of self-harm  · VBG on admission revealed mild CO2 retention (otherwise pH stable)  · Continue narcan infusion for now  · Will repeat VBG later this evening- plan to stop narcan infusion once evidence of resolving CO2 retention  · Currently no evidence of acute opioid withdrawal  · Continue to monitor with supportive care   · Step-down 2, cardio-pulmonary monitoring

## 2023-02-18 VITALS
DIASTOLIC BLOOD PRESSURE: 94 MMHG | TEMPERATURE: 97.7 F | HEIGHT: 62 IN | OXYGEN SATURATION: 96 % | SYSTOLIC BLOOD PRESSURE: 160 MMHG | RESPIRATION RATE: 16 BRPM | HEART RATE: 69 BPM | BODY MASS INDEX: 27.79 KG/M2 | WEIGHT: 151 LBS

## 2023-02-18 PROBLEM — R74.01 ELEVATED AST (SGOT): Status: RESOLVED | Noted: 2023-02-16 | Resolved: 2023-02-18

## 2023-02-18 LAB
ANION GAP SERPL CALCULATED.3IONS-SCNC: 8 MMOL/L (ref 5–14)
BUN SERPL-MCNC: 15 MG/DL (ref 5–25)
CALCIUM SERPL-MCNC: 8.9 MG/DL (ref 8.4–10.2)
CHLORIDE SERPL-SCNC: 100 MMOL/L (ref 96–108)
CO2 SERPL-SCNC: 26 MMOL/L (ref 21–32)
CREAT SERPL-MCNC: 0.68 MG/DL (ref 0.6–1.2)
ERYTHROCYTE [DISTWIDTH] IN BLOOD BY AUTOMATED COUNT: 14.1 % (ref 11.6–15.1)
EST. AVERAGE GLUCOSE BLD GHB EST-MCNC: 131 MG/DL
GFR SERPL CREATININE-BSD FRML MDRD: 96 ML/MIN/1.73SQ M
GLUCOSE SERPL-MCNC: 102 MG/DL (ref 70–99)
HBA1C MFR BLD: 6.2 %
HCT VFR BLD AUTO: 33.9 % (ref 34.8–46.1)
HGB BLD-MCNC: 11.2 G/DL (ref 11.5–15.4)
MCH RBC QN AUTO: 30.2 PG (ref 26.8–34.3)
MCHC RBC AUTO-ENTMCNC: 33 G/DL (ref 31.4–37.4)
MCV RBC AUTO: 91 FL (ref 82–98)
PLATELET # BLD AUTO: 253 THOUSANDS/UL (ref 149–390)
PMV BLD AUTO: 10.5 FL (ref 8.9–12.7)
POTASSIUM SERPL-SCNC: 3.5 MMOL/L (ref 3.5–5.3)
RBC # BLD AUTO: 3.71 MILLION/UL (ref 3.81–5.12)
SODIUM SERPL-SCNC: 134 MMOL/L (ref 135–147)
WBC # BLD AUTO: 9.82 THOUSAND/UL (ref 4.31–10.16)

## 2023-02-18 RX ORDER — BUPRENORPHINE AND NALOXONE 8; 2 MG/1; MG/1
8 FILM, SOLUBLE BUCCAL; SUBLINGUAL 2 TIMES DAILY
Qty: 60 FILM | Refills: 0 | Status: SHIPPED | OUTPATIENT
Start: 2023-02-18 | End: 2023-03-20

## 2023-02-18 RX ORDER — HYDROCHLOROTHIAZIDE 25 MG/1
25 TABLET ORAL DAILY
Qty: 30 TABLET | Refills: 0 | Status: SHIPPED | OUTPATIENT
Start: 2023-02-18 | End: 2023-03-20

## 2023-02-18 RX ORDER — BUDESONIDE AND FORMOTEROL FUMARATE DIHYDRATE 160; 4.5 UG/1; UG/1
2 AEROSOL RESPIRATORY (INHALATION) 2 TIMES DAILY
Qty: 10.2 G | Refills: 0 | Status: SHIPPED | OUTPATIENT
Start: 2023-02-18

## 2023-02-18 RX ORDER — AMLODIPINE BESYLATE 5 MG/1
5 TABLET ORAL DAILY
Qty: 30 TABLET | Refills: 0 | Status: SHIPPED | OUTPATIENT
Start: 2023-02-19 | End: 2023-03-21

## 2023-02-18 RX ADMIN — PANTOPRAZOLE SODIUM 40 MG: 40 TABLET, DELAYED RELEASE ORAL at 05:54

## 2023-02-18 RX ADMIN — BUPRENORPHINE AND NALOXONE 4 MG: 2; .5 FILM BUCCAL; SUBLINGUAL at 08:26

## 2023-02-18 RX ADMIN — MULTIPLE VITAMINS W/ MINERALS TAB 1 TABLET: TAB ORAL at 08:25

## 2023-02-18 RX ADMIN — ASPIRIN 81 MG: 81 TABLET, COATED ORAL at 08:25

## 2023-02-18 RX ADMIN — BUDESONIDE AND FORMOTEROL FUMARATE DIHYDRATE 2 PUFF: 160; 4.5 AEROSOL RESPIRATORY (INHALATION) at 08:29

## 2023-02-18 RX ADMIN — HYDROCHLOROTHIAZIDE 25 MG: 25 TABLET ORAL at 08:25

## 2023-02-18 RX ADMIN — BUSPIRONE HYDROCHLORIDE 10 MG: 10 TABLET ORAL at 08:25

## 2023-02-18 RX ADMIN — ACETAMINOPHEN 650 MG: 325 TABLET ORAL at 05:54

## 2023-02-18 RX ADMIN — ANASTROZOLE 1 MG: 1 TABLET, COATED ORAL at 08:25

## 2023-02-18 RX ADMIN — AMLODIPINE BESYLATE 5 MG: 5 TABLET ORAL at 08:25

## 2023-02-18 NOTE — ASSESSMENT & PLAN NOTE
Reports initially started snorting heroin around age 46 to cope with cancer-related pain  Denies h/o IVDU  Was stable on methadone x 7 yrs, followed with [facility] in [place]  Reports taper down x 6 months    Reports she was having intermittent cravings since stopping methadone completely- snorted 0 5 bag fentanyl yesterday AM resulting in overdose (see above)  · Further education regarding MAT this AM: patient now agreeable to Suboxone MAT   · Continue maintenance and follow with [facility]

## 2023-02-18 NOTE — ASSESSMENT & PLAN NOTE
· H/o anxiety/depression  · Currently follows with PCP ([facility])  · Current home regimen bupropion 75 mg daily and buspirone 10 mg BID  · Currently denies SI/thoughts of self-harm  · Reports opioid OD was accidental  · Continue home regimen  · Recommend outpatient f/u PCP, psychiatry

## 2023-02-18 NOTE — CASE MANAGEMENT
Case Management Discharge Planning Note    Patient name Yazmin Sanchez  Location 5T DETOX 513/5T DETOX 46* MRN [id number]  : 1965 Date 2023       Current Admission Date: 2023  Current Admission [address]   Patient Active Problem List    Diagnosis Date Noted   • Anemia 2023   • Opioid use disorder, moderate, dependence ([address] ) 2023   • Anxiety and depression 2023   • Elevated AST (SGOT) 2023   • Hypertension    • Obesity (BMI 30 0-34  9)    • Complete rectal prolapse with displacement of anal sphincter 2020   • Screening for malignant neoplasm of colon 2020   • History of cancer of right breast 2018   • Asthma 2018   • Hemorrhoids 2017   • Cancer associated pain 2015   • Constipation 2015   • Nicotine dependence 2013      LOS (days): 2  Geometric Mean LOS (GMLOS) (days):   Days to GMLOS:     OBJECTIVE:  Risk of Unplanned Readmission Score: 12 39         Current admission status: Inpatient   Preferred Pharmacy:   23 [facility] [place], [address] [facility] [address]  [address] [place] [address]  Phone: [phone] Fax: [phone]    Primary Care Provider: YULI Perez    Primary Insurance: [place] [employer] MEDICARE [address] REP  Secondary Insurance: [facility]    DISCHARGE DETAILS: CM met with pt to review plan for discharge; pt to be discharged home with outpatient substance use treatment follow up at [address] [place] [address]  Pt verified preferred pharmacy as  [address]; pt's daughter to provide transportation home  CM contacted pt's daughter and provided an update      Discharge planning discussed with[de-identified] Patient  Freedom of Choice: Yes                   Contacts  Patient Contacts: Janine Hampton  Relationship to Patient[de-identified] Family  Contact Method: Phone  Phone Number: [phone]  Reason/Outcome: Emergency Contact, Discharge Planning              Other Referral/Resources/Interventions Provided:  Referrals Provided[de-identified] Crisis Hotline, IOP, Peer Specialist, Support Group, Therapist (inpatient substance use treatment)    Would you like to participate in our [address] service program?  : No - Declined                                        IMM Given (Date):: 02/18/23  IMM Given to[de-identified] Patient  Family notified[de-identified] CM contacted pt's daughter, Astrid Murray and informed her of discharge plan

## 2023-02-18 NOTE — DISCHARGE INSTR - OTHER ORDERS
Drug and Alcohol Resources in [facility]    If you have health insurance, including medical assistance, there should be a phone number on your insurance card that you can call to find out how to access services  The card may say, “For [address] or “For Drug and Alcohol Services” or “For Substance Abuse Services” call the number provided  [facility]  [place] [address], [facility], [address]   [phone]  A  is available Monday through Friday from 8:00 am to 5:00 pm to provide you with assistance on accessing services for substance abuse  If you do not have health insurance and are in financial need, this office may also help you get the funding for the services that are necessary  24 [facility] provides funding to support three Recovery Centers in [facility]  These centers offer a safe, sober environment to those in recovery  A variety of programming including 12-Step Meetings, Luis Eduardo Barahona, Life Skills Workshops, etc  is offered at each location  [address] [place] [address]  [phone]  www  cleanslatebangor  org [address] on Main  Evelyne Contreras, [address] [facility]  [phone]  sjklml-hf-tidz  Stefan Lewis [address], [address] [facility]  [phone]   [address] [place] [address]  [phone]  www  [facility], [address] [place] [address]  [phone]  [facility]  Confidential free help, from public health agencies, to find substance use treatment and information  [phone]    Link for Zoom Codes for Virtual 12 step Meetings  Vishal shelton uk  aspx  Alcoholics Anonymous  [facility]  [phone]    http://www fry com/  Narcotics Anonymous  708.785.5637  https://[employer]/    Mental health resources in [facility]    [facility], [address]  Phone: [phone]) [phone]    Crisis Intervention number: 8-571-745-636-956-5060    Prevent suicide PA: In Crisis?  Call    1-234-157-VHSI (0827)    National Suicide Prevention Lifeline: 1-503.700.8528    Marie Tarango 98:   5034 [facility], [address] [place] [address] [address]  [address] [place] [address], 216 [facility]  [address], [address]  82[address] [facility] , [address] [facility], [address]  [address]  [address], [address] [facility] [address]  [facility] [place], 9[facility]  [phone]

## 2023-02-18 NOTE — ASSESSMENT & PLAN NOTE
· AST mildly elevated on initial CMP- improved this AM   · No acute abdominal pain on exam  · Denies h/o IVDU, no prior h/o hepatitis   · Check acute hepatitis panel- normal  · Routine outpatient monitoring

## 2023-02-18 NOTE — UTILIZATION REVIEW
NOTIFICATION OF INPATIENT ADMISSION   AUTHORIZATION REQUEST   SERVICING FACILITY:   [address] [place] [address]  [address] , [facility], [address] [place] [address]  Tax ID: [id number]  NPI: [id number] ATTENDING PROVIDER:  Attending Name and NPI#: Melba Rendon Md [[id number]]  Address: [address] , [facility], [address] [place] [address]  Phone: [phone]     ADMISSION INFORMATION:  Place of Service: Inpatient [address] [facility]  [address]  Place of Service Code: 21  Inpatient Admission Date/Time: 2/16/23  5:11 PM  Discharge Date/Time: 2/18/2023  9:27 AM  Admitting Diagnosis Code/Description:  Opioid overdose ([facility] [address] ) [T40 2X1A]     UTILIZATION REVIEW CONTACT:  Ramu Rosenbaum, Utilization   Network Utilization Review Department  Phone: [phone]  Fax [phone]  Email: [email or web address]  Contact for approvals/pending authorizations, clinical reviews, and discharge  PHYSICIAN ADVISORY SERVICES:  Medical Necessity Denial & Rwrj-zw-Aokr Review  Phone: [phone]  Fax: [phone]  Email: [email or web address]

## 2023-02-18 NOTE — ASSESSMENT & PLAN NOTE
· H/o asthma  · Outpatient inhaler regimen: symbicort BID, albuterol inhaler and nebulizer PRN  · Currently without evidence of acute exacerbation: VSS- afebrile, SpO2 95% RA  · Continue home inhaler regimen  · Outpatient f/u PCP

## 2023-02-23 ENCOUNTER — DOCUMENTATION (OUTPATIENT)
Dept: PSYCHIATRY | Facility: CLINIC | Age: 58
End: 2023-02-23

## 2023-02-23 NOTE — PROGRESS NOTES
Note Type:  Note                  Date of Service: [unfilled] 02/23/2023  Service:  [facility]    Note:  Note  Kassie Gonzalez 59 y o  female [id number]  Attending  Substance Use History     Social History     Substance and Sexual Activity   Alcohol Use Not Currently        Social History     Substance and Sexual Activity   Drug Use No       Encounter Type:   Patient Face-to-Face    Start Time 200  End Time 245    Recovery needs addressed at this meeting:  Life Skills, Social, Employment, Peer Support  and Recovery Resources    Note  Pt came in with daughter Bobo Watts (ROB signed)   Patient has a history of mental health issues as well as ISAAC  Patient was previously on methadone for 9 years but was weaned off  Patient started having cravings and mood swings and was unable to handle them and didn't ask for help  Patiens daughter Giovanny García found her unresponsive and called 911 where she was admitted to detox for opioid overdose  Patient was unaware of what substance she used   Patient does not want to go to [place] [address] meetings but not apposed to therapy and some type of recovery support     MA (Sarah Goss did put on Dr Moore Early waiting list for appointment)     scheduled appointment with Soni JEFFERSON 03/02/2023 1000   03/14/2023 with me to [facility] to get a bridge script so no lapse in RX prior to seeing toxicology     Referrals made  [facility] [address] [place] [address]   [facility]   TOX  CM     Next appointment date and time  03/02/2023 10am

## 2023-03-06 ENCOUNTER — DOCUMENTATION (OUTPATIENT)
Dept: PSYCHIATRY | Facility: CLINIC | Age: 58
End: 2023-03-06
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
## 2023-03-13 ENCOUNTER — TELEPHONE (OUTPATIENT)
Dept: PSYCHIATRY | Facility: CLINIC | Age: 58
End: 2023-03-13

## 2023-03-14 ENCOUNTER — DOCUMENTATION (OUTPATIENT)
Dept: PSYCHIATRY | Facility: CLINIC | Age: 58
End: 2023-03-14

## 2023-03-14 NOTE — PROGRESS NOTES
Note Type:  Note                  Date of Service: 03/14/2023  Service:  [facility] MAT Office    Note:  Note  Theta Eric 59 y o  female [phone]  Attending  Substance Use History     Social History     Substance and Sexual Activity   Alcohol Use Not Currently        Social History     Substance and Sexual Activity   Drug Use No       Encounter Type:   Patient Face-to-Face    Start Time 830  End Time 915    Recovery needs addressed at this meeting:  Peer Support  and Recovery Resources    Note  Patient presented on time appropriately dressed and appears happy  Patient not interested in NA meetings at this time although did take recovery support phone numbers in case of emergency  Called [facility] for patient to get a prescription until she meets with Dr Lior Mckeon 03/23/23   Had to change patients appointment with Heidi because she will be out 03/23/2023 will follow up with her after toxicology appointment       Patient will call office for ride if needed [phone]     Referrals made  [facility]    Next appointment date and time  03/23/2023

## 2023-03-23 ENCOUNTER — DOCUMENTATION (OUTPATIENT)
Dept: PSYCHIATRY | Facility: CLINIC | Age: 58
End: 2023-03-23

## 2023-03-23 ENCOUNTER — OFFICE VISIT (OUTPATIENT)
Dept: OTHER | Age: 58
End: 2023-03-23

## 2023-03-23 VITALS
HEIGHT: 62 IN | SYSTOLIC BLOOD PRESSURE: 136 MMHG | DIASTOLIC BLOOD PRESSURE: 79 MMHG | HEART RATE: 82 BPM | WEIGHT: 152 LBS | BODY MASS INDEX: 27.97 KG/M2

## 2023-03-23 DIAGNOSIS — F11.20 OPIOID USE DISORDER, SEVERE, DEPENDENCE (HCC): Primary | ICD-10-CM

## 2023-03-23 RX ORDER — BUPRENORPHINE AND NALOXONE 8; 2 MG/1; MG/1
8 FILM, SOLUBLE BUCCAL; SUBLINGUAL 2 TIMES DAILY
Qty: 60 FILM | Refills: 0 | Status: SHIPPED | OUTPATIENT
Start: 2023-03-23 | End: 2023-04-22

## 2023-03-23 RX ORDER — ATORVASTATIN CALCIUM 20 MG/1
20 TABLET, FILM COATED ORAL DAILY
COMMUNITY
Start: 2023-01-13

## 2023-03-23 NOTE — PROGRESS NOTES
Note Type:  Note                  Date of Service: 03/23/2023  Service:  [address] MAT Office    Note:  Note  Polly Cui 59 y o  female [phone]  Attending  Substance Use History     Social History     Substance and Sexual Activity   Alcohol Use Not Currently        Social History     Substance and Sexual Activity   Drug Use No       Encounter Type:   Patient Face-to-Face    Start Time 1000  End Time 1030    Recovery needs addressed at this meeting:  Peer Support     Note  Patient came in with daughter was appropriatley dressed and in good mood     Patient reports taking medication as prescribed and no substance use    Patient and daughter need appointments schedule with tox and psychotherapy wed- Friday due to rides     Next appointment date and time  Patient will call if she needs to see me for additional support   Patients next appt in office is 04/20/2023 9am and 1015am

## 2023-03-23 NOTE — PATIENT INSTRUCTIONS
Opioid Use Disorder   WHAT YOU NEED TO KNOW:   Opioid use disorder (OUD) is a medical condition that develops from long-term use or misuse of an opioid  You are not able to stop taking the opioid even though it causes physical or social problems  OUD may be use of an opioid such as heroin or misuse of a prescription opioid such as fentanyl  This disorder is also called opioid abuse  DISCHARGE INSTRUCTIONS:   Call your local emergency number (911 in the [address] [facility]) or have someone call if:   You have chest pain or trouble breathing  You have a seizure  You cannot be woken  Seek care immediately if:   You have trouble staying awake and your breathing is slow or shallow  You have a fast, slow, or irregular heartbeat  You have pale or cold skin  You feel lightheaded or faint  Your speech is slurred, or you are confused  Call your doctor if:   You have nausea and are vomiting, or you cannot stop vomiting  You have balance problems  You have questions or concerns about your condition or care  Therapy  may be offered in a hospital, outpatient facility, or treatment center  Your healthcare provider can help you make decisions about treatment  Therapy may include work with a psychiatrist, psychologist, or therapist  Therapy can happen in group or individual sessions  Some therapy may include family members  Your healthcare provider or therapist may be able to help you find a support group in your area  A support group is a way to get help from others who have OUD  What you need to know about opioid safety:   Do not suddenly stop the opioid  A sudden stop may cause dangerous side effects  Work with your healthcare provider to decrease the amount slowly  Do not take prescription opioids that belong to someone else  The kind or amount may not be right for you  Do not mix opioids with other medicines, drugs, or alcohol  The combination can cause an overdose, or cause you to stop breathing  Alcohol, sleeping pills, and medicines such as antihistamines can make you sleepy  A combination with opioids can lead to a coma  Learn about the signs of an overdose  Examples include slow breathing, pale or cold skin, and small pupils  Tell others about these signs so they will get help for you if needed  Talk to your healthcare provider about naloxone  You may be able to keep naloxone at home in case of an overdose  Your family and friends can also be trained on how to give it to you if needed  Take prescribed opioids exactly as directed  Do not take more than the recommended amount  Do not take it more often than recommended or for a different reason  Be sure to remove an old patch before you place a new one  Make sure the patch is not exposed to sunlight  Sunlight speeds up the opioid release from the patch  Keep opioids out of the reach of children  Store opioids in a locked cabinet or in a location that children cannot get to  Follow instructions for what to do with leftover prescription opioids  Your healthcare provider will give you instructions for how to dispose of it safely  This helps make sure no one else gets to it  Follow up with your doctor or therapist as directed:  Write down your questions so you remember to ask them during your visits  For support and more information:   Substance Abuse and [address] , [address] [place]  Web Address: https://[employer]/    [facility] on Drug Abuse  [address] [place] [address]  Phone: 1- 568 - [phone]  Web Address: www madison [employer]ih gov  © Copyright Karli Black 2022 Information is for End User's use only and may not be sold, redistributed or otherwise used for commercial purposes  The above information is an  only  It is not intended as medical advice for individual conditions or treatments   Talk to your doctor, nurse or pharmacist before following any medical regimen to see if it is safe and effective for you

## 2023-03-23 NOTE — PROGRESS NOTES
[facility] Program     History and Physical  Vanessa Kendall 59 y o  female MRN: [id number]   @ Encounter: [id number]       1  Opioid use disorder, severe, dependence ([facility] [address] )  -     Had history of opioid use for 2 years, then had 7 years of sobriety on methadone  The methadone was tapered down in February and patient had nearly immediately relapse to illicit opioid use  - Admitted to Medical Detox Unit at [address] [place] [address] after opioid overdose last month and was started on Suboxone  - Patient endorses she is doing well on Suboxone, 8/2 mg BID with no cravings and is amenable to continuing Suboxone  - Buprenorphine agreement discussed with patient and signed  - No history of IVDU  Acute hepatitis panel done last month negative  Deferring HIV testing  - Prescription for Suboxone, 8/2 mg BID (60 films) sent to patient's preferred pharmacy  - Follow-up in 1 month      In addition to the above recommendations, the patient will also be referred to the [facility] Certified Addiction Counselors for additional evaluation and psychotherapy  We will also engage our Certified  for patient support  HPI: Colleen Yepez is a 59y o  year old female with history of opioid use disorder who presents to establish care  The patient had diagnosis of breast cancer 9 years ago and states she began snorting heroin to help with pain control  She was using opioids by insufflation for about 2 years and was then able to get on methadone  She was on methadone for 7 years without relapse, but methadone was tapered down last month and discontinued  A little over a week after discontinuation of methadone the patient relapsed to opioid use  She admitted to using half a bag of fentanyl by insufflation and was brought to the emergency department after receiving naloxone  She ultimately was started on a naloxone infusion and transferred to the medical detox unit at [address] [place] [address]    She was able to be titrated off the naloxone infusion and was then successfully started on Suboxone  Patient feels she is doing very well on the Suboxone with no further cravings  She is motivated to continue Suboxone  Denies any history of IV drug use  Patient states she is otherwise overall at her baseline health with no acute complaints  She has an appointment after the visit with me today to start engaging with therapy services here  Opioid History   Age of first use: 48 years   Opioids currently used: None currently  Route of use: insufflation  Date/Time of Last Opioid Use: 02/16/23   Current Signs/Symptoms of Opioid Withdrawal: No    OUD MAT History   Currently on methadone maintenance? no   Currently on naltrexone? no   Currently taking buprenorphine? yes   History of prior treatment with buprenorphine? no   History of using buprenorphine without having a prescription? no   History of IVDU? no   Co-existing substance use? no       Review of PDMP:     PDMP Review       Value Time User    PDMP Reviewed  Yes 2/16/2023  5:43 PM Alayna Elias PA-C             Social History     Tobacco Use   Smoking Status Some Days   • Packs/day: 0 00   • Types: Cigarettes   • Last attempt to quit: 2/1/2020   • Years since quitting: 3 1   Smokeless Tobacco Never        Review of Systems   Constitutional: Negative for chills and fever  HENT: Negative  Eyes: Negative  Respiratory: Negative for cough and shortness of breath  Cardiovascular: Negative for chest pain and leg swelling  Gastrointestinal: Negative for abdominal pain, nausea and vomiting  Endocrine: Negative  Genitourinary: Negative  Musculoskeletal: Negative  Skin: Negative  Neurological: Negative for headaches  Psychiatric/Behavioral:        Has some depression and anxiety at baseline but not worse    Negative for SI or HI     ROS     Historical Information      Past Medical History:   Diagnosis Date   • Asthma    • Breast cancer ([facility] )    • Cancer ([facility] [facility] ) breast cancer   • History of chemotherapy    • Hypertension    • Psychiatric disorder     depression and anxiety        Past Surgical History:   Procedure Laterality Date   • MASTECTOMY  2014    Right side        Family History   Problem Relation Age of Onset   • No Known Problems Mother    • No Known Problems Father    • No Known Problems Daughter    • No Known Problems Maternal Grandmother    • No Known Problems Maternal Grandfather    • No Known Problems Paternal Grandmother    • No Known Problems Paternal Grandfather    • Colon cancer Neg Hx         Social History      Marital Status: Single     Patient Pre-hospital Living Situation: Lives independently    Communicable diseases:    Tuberculosis (TB):   Have you traveled extensively (more than 4 weeks) outside the [place]  in the last five years to high tuberculosis incidence areas ([place], [place], [place], [place] and [facility])?: No  Have you resided in any of these facilities in the past year: jails, prisons, shelters, nursing homes, and other long-term care facilities such as rehabilitation centers?  If residents of any of these facilities were tested within the past three (3) mirna: No  Have you had any close contact with someone diagnosed with tuberculosis?: No  Have you been homeless within the past year?: No  Have you ever injected drugs?: No  Do you or anyone in your household, currently have the following symptoms such as a sustained cough for two or more weeks, coughing up blood, fever/chills, loss of appetite, unexplained weight loss, fatigue, night sweats? : No  Do you currently have or anticipate having any condition that would decrease your immune system?: No    Viral Hepatitis:  Have you been tested for Hepatitis B or C?: Yes  Diagnosed with Hepatitis B or C?: No    HIV:  Have you been diagnosed with HIV?: No  Do you have high risk factors for HIV including injecting drugs, multiple sexual partners, engaging in unprotected sexual activity, infected or recently treated for viral hepatitis, or infected or recently treated for a sexually transmitted disease(STD)?: No    No Known Allergies     Prior to Admission medications    Medication Sig Start Date End Date Taking? Authorizing Provider   albuterol (2 5 mg/3 mL) 0 083 % nebulizer solution Take 1 vial (2 5 mg total) by nebulization every 6 (six) hours as needed for wheezing or shortness of breath for up to 25 doses 2/3/20  Yes Jerrod Rocha MD   albuterol (ACCUNEB) 1 25 MG/3ML nebulizer solution Take 3 mL by nebulization every 6 (six) hours as needed for wheezing 1/22/18  Yes Romie More, DO   albuterol (PROVENTIL HFA,VENTOLIN HFA) 90 mcg/act inhaler Inhale 2 puffs every 6 (six) hours as needed for wheezing 1/22/18  Yes Romie More, DO   anastrozole (ARIMIDEX) 1 mg tablet Take 1 tablet (1 mg total) by mouth daily 11/10/22  Yes Baldev Amato MD   aspirin (ECOTRIN LOW STRENGTH) 81 mg EC tablet Take 81 mg by mouth daily   Yes Historical Provider, MD   atorvastatin (LIPITOR) 20 mg tablet 20 mg in the morning 1/13/23  Yes Historical Provider, MD   budesonide-formoterol (SYMBICORT) 160-4 5 mcg/act inhaler Inhale 2 puffs 2 (two) times a day Rinse mouth after use   2/18/23  Yes YULI Vides   buprenorphine-naloxone (Suboxone) 8-2 mg Place 1 Film (8 mg total) under the tongue 2 (two) times a day 3/23/23 4/22/23 Yes Patrice Telles MD   buPROPion [facility]) 75 mg tablet Take 75 mg by mouth once   Yes Historical Provider, MD   busPIRone (BUSPAR) 10 mg tablet Take 10 mg by mouth 2 (two) times a day   Yes Historical Provider, MD   cholecalciferol (VITAMIN D3) 1,000 units tablet Take 1,000 Units by mouth daily   Yes Historical Provider, MD   hydrocortisone (ANUSOL-HC) 2 5 % rectal cream Apply rectally 2 times daily 9/6/19  Yes Rosi Huff MD   hydrocortisone (ANUSOL-HC) 2 5 % rectal cream Apply rectally 2 times daily 2/13/20  Yes Tee Millan MD   hydrocortisone (ANUSOL-HC) 2 5 % rectal cream Apply topically 2 (two) times a day 9/10/20  Yes Wenceslao Hancock MD   ibuprofen (MOTRIN) 600 mg tablet Take 1 tablet (600 mg total) by mouth every 8 (eight) hours as needed for mild pain 11/10/22  Yes Jami Max MD   ipratropium (ATROVENT) 0 02 % nebulizer solution Take 1 vial (0 5 mg total) by nebulization 4 (four) times a day 2/3/20  Yes Rylee Ruth MD   omeprazole (PriLOSEC) 20 mg delayed release capsule Take 20 mg by mouth daily   Yes Mónica Ordonez MD   phenazopyridine (PYRIDIUM) 200 mg tablet Take 1 tablet (200 mg total) by mouth 3 (three) times a day 8/25/21  Yes Shannon Simeon MD   amLODIPine (NORVASC) 5 mg tablet Take 1 tablet (5 mg total) by mouth daily Do not start before February 19, 2023  2/19/23 3/21/23  Maura MeadowsYULI babin   hydrochlorothiazide (HYDRODIURIL) 25 mg tablet Take 1 tablet (25 mg total) by mouth daily 2/18/23 3/20/23  Peola IanMEIRNP   naproxen (EC NAPROSYN) 500 MG EC tablet Take 1 tablet (500 mg total) by mouth 2 (two) times a day with meals  Patient not taking: Reported on 6/11/2021 2/4/19 2/4/20  Cristian Dumont PA-C   polyethylene glycol (MIRALAX) 17 g packet Take 17 g by mouth daily for 28 days  Patient not taking: Reported on 6/11/2021 2/13/20 3/12/20  Cathi Ceballos MD       buprenorphine-naloxone     Objective      Vitals    Vitals:    03/23/23 0908   BP: 136/79   Pulse: 82       Physical Exam  Vitals reviewed  Constitutional:       General: She is not in acute distress  HENT:      Head: Normocephalic  Mouth/Throat:      Mouth: Mucous membranes are moist    Eyes:      Conjunctiva/sclera: Conjunctivae normal       Pupils: Pupils are equal, round, and reactive to light  Cardiovascular:      Rate and Rhythm: Normal rate and regular rhythm  Heart sounds: No murmur heard  No friction rub  No gallop  Pulmonary:      Effort: Pulmonary effort is normal  No respiratory distress  Breath sounds: Normal breath sounds  Abdominal:      General: There is no distension  Palpations: Abdomen is soft  Tenderness: There is no abdominal tenderness  Musculoskeletal:      Cervical back: Neck supple  Right lower leg: No edema  Left lower leg: No edema  Skin:     General: Skin is warm and dry  Neurological:      Mental Status: She is alert  Psychiatric:         Mood and Affect: Mood normal          Behavior: Behavior normal          Thought Content: Thought content normal             Data:     Lab Results:  Results from last 6 Months   Lab Units 02/18/23  0551   WBC Thousand/uL 9 82   HEMOGLOBIN g/dL 11 2*   HEMATOCRIT % 33 9*   PLATELETS Thousands/uL 253        Results from last 6 Months   Lab Units 02/18/23  0551 02/17/23  0509   POTASSIUM mmol/L 3 5 3 7   CHLORIDE mmol/L 100 103   CO2 mmol/L 26 29   BUN mg/dL 15 19   CREATININE mg/dL 0 68 0 73   CALCIUM mg/dL 8 9 8 7   ALBUMIN g/dL  --  3 5   ALK PHOS U/L  --  85   ALT U/L  --  35*   AST U/L  --  38*        Hepatitis panel:  Results from last 6 Months   Lab Units 02/17/23  0509   HEP B S AG  Non-reactive   HEP A IGM  Non-reactive   HEP C AB  Non-reactive   HEP B C IGM  Non-reactive           Counseling / Coordination of Care     Total time spent today 40 minutes  Greater than 50% of total time was spent with the patient and / or family counseling and / or coordination of care  ** Please Note: This note may have been constructed using a voice recognition system   **     Daryle Peak, MD

## 2023-05-11 ENCOUNTER — ANESTHESIA EVENT (OUTPATIENT)
Dept: GASTROENTEROLOGY | Facility: AMBULARY SURGERY CENTER | Age: 58
End: 2023-05-11

## 2023-05-11 ENCOUNTER — HOSPITAL ENCOUNTER (OUTPATIENT)
Dept: GASTROENTEROLOGY | Facility: AMBULARY SURGERY CENTER | Age: 58
Setting detail: OUTPATIENT SURGERY
Discharge: HOME | End: 2023-05-11
Attending: INTERNAL MEDICINE

## 2023-05-11 ENCOUNTER — ANESTHESIA (OUTPATIENT)
Dept: GASTROENTEROLOGY | Facility: AMBULARY SURGERY CENTER | Age: 58
End: 2023-05-11

## 2023-05-11 VITALS
SYSTOLIC BLOOD PRESSURE: 171 MMHG | RESPIRATION RATE: 18 BRPM | DIASTOLIC BLOOD PRESSURE: 90 MMHG | WEIGHT: 140 LBS | HEART RATE: 16 BPM | HEIGHT: 62 IN | BODY MASS INDEX: 25.76 KG/M2 | OXYGEN SATURATION: 99 % | TEMPERATURE: 97 F

## 2023-05-11 DIAGNOSIS — R19.5 ABNORMAL FECES: ICD-10-CM

## 2023-05-11 RX ORDER — SODIUM CHLORIDE, SODIUM LACTATE, POTASSIUM CHLORIDE, CALCIUM CHLORIDE 600; 310; 30; 20 MG/100ML; MG/100ML; MG/100ML; MG/100ML
INJECTION, SOLUTION INTRAVENOUS CONTINUOUS PRN
Status: DISCONTINUED | OUTPATIENT
Start: 2023-05-11 | End: 2023-05-11

## 2023-05-11 RX ORDER — PROPOFOL 10 MG/ML
INJECTION, EMULSION INTRAVENOUS AS NEEDED
Status: DISCONTINUED | OUTPATIENT
Start: 2023-05-11 | End: 2023-05-11

## 2023-05-11 RX ADMIN — SODIUM CHLORIDE, SODIUM LACTATE, POTASSIUM CHLORIDE, AND CALCIUM CHLORIDE: .6; .31; .03; .02 INJECTION, SOLUTION INTRAVENOUS at 11:22

## 2023-05-11 RX ADMIN — PROPOFOL 50 MG: 10 INJECTION, EMULSION INTRAVENOUS at 11:33

## 2023-05-11 RX ADMIN — PROPOFOL 120 MG: 10 INJECTION, EMULSION INTRAVENOUS at 11:29

## 2023-05-11 RX ADMIN — PROPOFOL 30 MG: 10 INJECTION, EMULSION INTRAVENOUS at 11:31

## 2023-05-11 NOTE — H&P
History and Physical - [facility]  Alley Yarbrough 59 y o  female MRN: [id number]                  HPI: Alley Yarbrough is a 59y o  year old female who presents for screening colonoscopy      REVIEW OF SYSTEMS: Per the HPI, and otherwise unremarkable      Historical Information   Past Medical History:   Diagnosis Date   • Asthma    • Breast cancer ([facility] [address] )    • Cancer ([facility] )     breast cancer   • History of chemotherapy    • Hypertension    • Psychiatric disorder     depression and anxiety     Past Surgical History:   Procedure Laterality Date   • MASTECTOMY  2014    Right side     Social History   Social History     Substance and Sexual Activity   Alcohol Use Not Currently     Social History     Substance and Sexual Activity   Drug Use No     Social History     Tobacco Use   Smoking Status Some Days   • Packs/day: 0 00   • Types: Cigarettes   • Last attempt to quit: 2/1/2020   • Years since quitting: 3 2   Smokeless Tobacco Never     Family History   Problem Relation Age of Onset   • No Known Problems Mother    • No Known Problems Father    • No Known Problems Daughter    • No Known Problems Maternal Grandmother    • No Known Problems Maternal Grandfather    • No Known Problems Paternal Grandmother    • No Known Problems Paternal Grandfather    • Colon cancer Neg Hx        Meds/Allergies       Current Outpatient Medications:   •  albuterol (2 5 mg/3 mL) 0 083 % nebulizer solution  •  aspirin (ECOTRIN LOW STRENGTH) 81 mg EC tablet  •  atorvastatin (LIPITOR) 20 mg tablet  •  budesonide-formoterol (SYMBICORT) 160-4 5 mcg/act inhaler  •  buprenorphine-naloxone (Suboxone) 8-2 mg  •  buPROPion (WELLBUTRIN) 75 mg tablet  •  cholecalciferol (VITAMIN D3) 1,000 units tablet  •  ibuprofen (MOTRIN) 600 mg tablet  •  omeprazole (PriLOSEC) 20 mg delayed release capsule  •  albuterol (ACCUNEB) 1 25 MG/3ML nebulizer solution  •  albuterol (PROVENTIL HFA,VENTOLIN HFA) 90 mcg/act inhaler  •  amLODIPine (NORVASC) 5 mg "tablet  •  anastrozole (ARIMIDEX) 1 mg tablet  •  busPIRone (BUSPAR) 10 mg tablet  •  hydrochlorothiazide (HYDRODIURIL) 25 mg tablet  •  hydrocortisone (ANUSOL-HC) 2 5 % rectal cream  •  ipratropium (ATROVENT) 0 02 % nebulizer solution  •  naproxen (EC NAPROSYN) 500 MG EC tablet  •  phenazopyridine (PYRIDIUM) 200 mg tablet  •  polyethylene glycol (MIRALAX) 17 g packet  No current facility-administered medications for this encounter  Facility-Administered Medications Ordered in Other Encounters:   •  lactated ringers infusion, , Intravenous, Continuous PRN, New Bag at 05/11/23 1122  •  propofol (DIPRIVAN) 200 MG/20ML bolus injection, , Intravenous, PRN, 100 mg at 05/11/23 1125    No Known Allergies    Objective     /98   Pulse 80   Temp (!) 97 3 °F (36 3 °C) (Temporal)   Resp 18   Ht 5' 2\" (1 575 m)   Wt 63 5 kg (140 lb)   SpO2 100%   BMI 25 61 kg/m²       PHYSICAL EXAM    Gen: NAD  Head: NCAT  CV: RRR  CHEST: Clear  ABD: soft, NT/ND  EXT: no edema      ASSESSMENT/PLAN:  This is a 59y o  year old female here for colonoscopy, and she is stable and optimized for her procedure          "

## 2023-05-11 NOTE — ANESTHESIA PREPROCEDURE EVALUATION
Procedure:  COLONOSCOPY    Relevant Problems   CARDIO   (+) Hypertension      HEMATOLOGY   (+) Anemia      MUSCULOSKELETAL   (+) Complete rectal prolapse with displacement of anal sphincter      NEURO/PSYCH   (+) Anxiety and depression   (+) History of cancer of right breast      PULMONARY   (+) Asthma        Physical Exam    Airway    Mallampati score: II         Dental       Cardiovascular  Cardiovascular exam normal    Pulmonary  Pulmonary exam normal     Other Findings        Anesthesia Plan  ASA Score- 3     Anesthesia Type- IV sedation with anesthesia with ASA Monitors  Additional Monitors:   Airway Plan:           Plan Factors-Exercise tolerance (METS): >4 METS  Chart reviewed  EKG reviewed  Imaging results reviewed  Existing labs reviewed  Patient summary reviewed  Patient is not a current smoker  Patient not instructed to abstain from smoking on day of procedure  Patient smoked on day of surgery  Obstructive sleep apnea risk education given perioperatively  Induction-     Postoperative Plan-     Informed Consent- Anesthetic plan and risks discussed with patient

## 2023-05-11 NOTE — ANESTHESIA POSTPROCEDURE EVALUATION
Post-Op Assessment Note    CV Status:  Stable    Pain management: adequate     Mental Status:  Alert and awake   Hydration Status:  Euvolemic   PONV Controlled:  Controlled   Airway Patency:  Patent      Post Op Vitals Reviewed: Yes      Staff: CRNA, Anesthesiologist         No notable events documented      BP   116/59   Temp 97   Pulse 78   Resp 15   SpO2 100

## 2023-05-12 ENCOUNTER — NURSE TRIAGE (OUTPATIENT)
Dept: OTHER | Facility: OTHER | Age: 58
End: 2023-05-12

## 2023-05-12 NOTE — TELEPHONE ENCOUNTER
"    Reason for Disposition  • MODERATE difficulty breathing (e g , speaks in phrases, SOB even at rest, pulse 100-120) of new-onset or worse than normal    Answer Assessment - Initial Assessment Questions  1  RESPIRATORY STATUS: \"Describe your breathing? \" (e g , wheezing, shortness of breath, unable to speak, severe coughing)       Pt states she is having difficulty breathing  2  ONSET: \"When did this breathing problem begin? \"       Since last night  3  PATTERN \"Does the difficult breathing come and go, or has it been constant since it started? \"       Intermittent but feeling like she is having difficulty now  4  SEVERITY: \"How bad is your breathing? \" (e g , mild, moderate, severe)     - MILD: No SOB at rest, mild SOB with walking, speaks normally in sentences, can lay down, no retractions, pulse < 100      - MODERATE: SOB at rest, SOB with minimal exertion and prefers to sit, cannot lie down flat, speaks in phrases, mild retractions, audible wheezing, pulse 100-120      - SEVERE: Very SOB at rest, speaks in single words, struggling to breathe, sitting hunched forward, retractions, pulse > 120       Mainly with exertion  5  RECURRENT SYMPTOM: \"Have you had difficulty breathing before? \" If Yes, ask: \"When was the last time? \" and \"What happened that time? \"       No    6  CARDIAC HISTORY: \"Do you have any history of heart disease? \" (e g , heart attack, angina, bypass surgery, angioplasty)       Yes    7  LUNG HISTORY: \"Do you have any history of lung disease? \"  (e g , pulmonary embolus, asthma, emphysema)      Asthma- albuterol uses rescue inhaler that is not helping  8  CAUSE: \"What do you think is causing the breathing problem? \"       Unknown    9  OTHER SYMPTOMS: \"Do you have any other symptoms? (e g , dizziness, runny nose, cough, chest pain, fever)       Off balance  Denies chest pain  Thinks she is wheezing off and on      Protocols used: BREATHING DIFFICULTY-ADULT-OH      Pt called GI " specialist concerned that her difficulty breathing was related to side effect from colonoscopy or anesthesia  Recommended pt go to ED  Pt at daughters side  Pt refuses stating she prefers to wait  Rationale discussed with pts daughter who verbalized understanding and states she is going to try to speak to her mom to go to ED

## 2023-05-18 ENCOUNTER — OFFICE VISIT (OUTPATIENT)
Dept: OTHER | Age: 58
End: 2023-05-18

## 2023-05-18 VITALS
BODY MASS INDEX: 26.31 KG/M2 | HEART RATE: 90 BPM | WEIGHT: 143 LBS | HEIGHT: 62 IN | SYSTOLIC BLOOD PRESSURE: 168 MMHG | DIASTOLIC BLOOD PRESSURE: 79 MMHG

## 2023-05-18 DIAGNOSIS — G47.9 SLEEP DIFFICULTIES: ICD-10-CM

## 2023-05-18 DIAGNOSIS — F11.20 OPIOID USE DISORDER, SEVERE, DEPENDENCE (HCC): Primary | ICD-10-CM

## 2023-05-18 RX ORDER — LANOLIN ALCOHOL/MO/W.PET/CERES
3 CREAM (GRAM) TOPICAL
Qty: 30 TABLET | Refills: 5 | Status: SHIPPED | OUTPATIENT
Start: 2023-05-18

## 2023-05-18 RX ORDER — BUPRENORPHINE AND NALOXONE 8; 2 MG/1; MG/1
8 FILM, SOLUBLE BUCCAL; SUBLINGUAL 2 TIMES DAILY
Qty: 60 FILM | Refills: 0 | Status: SHIPPED | OUTPATIENT
Start: 2023-05-18 | End: 2023-06-17

## 2023-05-18 NOTE — ASSESSMENT & PLAN NOTE
· Doing well with the MOUD so far     · No cravings or use  · Will continue current treatment plan  · Urine bup ordered for routine diversion monitoring purposes

## 2023-05-18 NOTE — ASSESSMENT & PLAN NOTE
· Pt describes having difficulty falling asleep and sometimes waking up at night and having trouble falling back asleep for the past few weeks  · This is not likely related to the MOUD  · We will trial melatonin first with sleep hygiene   I also urged her to discuss with her PCP who manages her other medications prior to starting any additional pharmaceutical

## 2023-05-18 NOTE — PROGRESS NOTES
[facility] Program    Progress Note  Alley Yarbrough 59 y o  female MRN: [id number]  @ Encounter: [id number]      1  Opioid use disorder, severe, dependence ([facility] [address] )  Assessment & Plan:  · Doing well with the MOUD so far  · No cravings or use  · Will continue current treatment plan  · Urine bup ordered for routine diversion monitoring purposes    Orders:  -     Suboxone  -     buprenorphine-naloxone (Suboxone) 8-2 mg; Place 1 Film (8 mg total) under the tongue 2 (two) times a day    2  Sleep difficulties  Assessment & Plan:  · Pt describes having difficulty falling asleep and sometimes waking up at night and having trouble falling back asleep for the past few weeks  · This is not likely related to the MOUD  · We will trial melatonin first with sleep hygiene  I also urged her to discuss with her PCP who manages her other medications prior to starting any additional pharmaceutical     Orders:  -     melatonin 3 mg; Take 1 tablet (3 mg total) by mouth daily at bedtime        Support Services  Case Management and Certified  are following  Patient was referred to the [facility] Certified Addiction Counselors: Yes  The patient is actively engaged with the [facility] Certified Addiction Counselor’s psychotherapy plan: Yes    buprenorphine-naloxone      PDMP reviewed:     PDMP Review       Value Time User    PDMP Reviewed  Yes 5/18/2023  8:47 AM Joshua Jacobs, DO          SUBJECTIVE  Feels well   No complaints today    Drug cravings: no  Motivation to continue treatment: high      Current Outpatient Medications:   •  albuterol (2 5 mg/3 mL) 0 083 % nebulizer solution, Take 1 vial (2 5 mg total) by nebulization every 6 (six) hours as needed for wheezing or shortness of breath for up to 25 doses, Disp: 75 mL, Rfl: 0  •  albuterol (ACCUNEB) 1 25 MG/3ML nebulizer solution, Take 3 mL by nebulization every 6 (six) hours as needed for wheezing, Disp: 75 mL, Rfl: 0  •  albuterol (PROVENTIL HFA,VENTOLIN HFA) 90 mcg/act inhaler, Inhale 2 puffs every 6 (six) hours as needed for wheezing, Disp: 1 Inhaler, Rfl: 0  •  amLODIPine (NORVASC) 5 mg tablet, Take 1 tablet (5 mg total) by mouth daily Do not start before February 19, 2023 , Disp: 30 tablet, Rfl: 0  •  anastrozole (ARIMIDEX) 1 mg tablet, Take 1 tablet (1 mg total) by mouth daily, Disp: 90 tablet, Rfl: 3  •  aspirin (ECOTRIN LOW STRENGTH) 81 mg EC tablet, Take 81 mg by mouth daily, Disp: , Rfl:   •  atorvastatin (LIPITOR) 20 mg tablet, 20 mg in the morning, Disp: , Rfl:   •  budesonide-formoterol (SYMBICORT) 160-4 5 mcg/act inhaler, Inhale 2 puffs 2 (two) times a day Rinse mouth after use , Disp: 10 2 g, Rfl: 0  •  buprenorphine-naloxone (Suboxone) 8-2 mg, Place 1 Film (8 mg total) under the tongue 2 (two) times a day, Disp: 60 Film, Rfl: 0  •  buPROPion (WELLBUTRIN) 75 mg tablet, Take 75 mg by mouth once, Disp: , Rfl:   •  busPIRone (BUSPAR) 10 mg tablet, Take 10 mg by mouth 2 (two) times a day, Disp: , Rfl:   •  cholecalciferol (VITAMIN D3) 1,000 units tablet, Take 1,000 Units by mouth daily, Disp: , Rfl:   •  hydrochlorothiazide (HYDRODIURIL) 25 mg tablet, Take 1 tablet (25 mg total) by mouth daily, Disp: 30 tablet, Rfl: 0  •  hydrocortisone (ANUSOL-HC) 2 5 % rectal cream, Apply rectally 2 times daily, Disp: 28 35 g, Rfl: 0  •  ibuprofen (MOTRIN) 600 mg tablet, Take 1 tablet (600 mg total) by mouth every 8 (eight) hours as needed for mild pain, Disp: 90 tablet, Rfl: 1  •  ipratropium (ATROVENT) 0 02 % nebulizer solution, Take 1 vial (0 5 mg total) by nebulization 4 (four) times a day, Disp: 25 vial, Rfl: 0  •  melatonin 3 mg, Take 1 tablet (3 mg total) by mouth daily at bedtime, Disp: 30 tablet, Rfl: 5  •  omeprazole (PriLOSEC) 20 mg delayed release capsule, Take 20 mg by mouth daily, Disp: , Rfl:   •  phenazopyridine (PYRIDIUM) 200 mg tablet, Take 1 tablet (200 mg total) by mouth 3 (three) times a day, Disp: 6 tablet, Rfl: 0  •  naproxen (EC NAPROSYN) 500 MG EC tablet, Take 1 tablet (500 mg total) by mouth 2 (two) times a day with meals (Patient not taking: Reported on 6/11/2021), Disp: 10 tablet, Rfl: 0  •  polyethylene glycol (MIRALAX) 17 g packet, Take 17 g by mouth daily for 28 days (Patient not taking: Reported on 6/11/2021), Disp: 28 each, Rfl: 0    Objective     Vitals:    05/18/23 1044   BP: 168/79   Pulse: 90       Physical Exam  Constitutional:       General: She is not in acute distress  Appearance: Normal appearance  Cardiovascular:      Rate and Rhythm: Normal rate  Pulmonary:      Effort: Pulmonary effort is normal    Neurological:      Mental Status: She is alert and oriented to person, place, and time  Psychiatric:         Mood and Affect: Mood normal          Behavior: Behavior normal               Lab Results:   Results from last 6 Months   Lab Units 02/18/23  0551   WBC Thousand/uL 9 82   HEMOGLOBIN g/dL 11 2*   HEMATOCRIT % 33 9*   PLATELETS Thousands/uL 253      Results from last 6 Months   Lab Units 02/18/23  0551 02/17/23  0509   POTASSIUM mmol/L 3 5 3 7   CHLORIDE mmol/L 100 103   CO2 mmol/L 26 29   BUN mg/dL 15 19   CREATININE mg/dL 0 68 0 73   CALCIUM mg/dL 8 9 8 7   ALBUMIN g/dL  --  3 5   ALK PHOS U/L  --  85   ALT U/L  --  35*   AST U/L  --  38*     Results from last 6 Months   Lab Units 02/17/23  0509   HEP B S AG  Non-reactive   HEP A IGM  Non-reactive   HEP C AB  Non-reactive   HEP B C IGM  Non-reactive                     Counseling / Coordination of Care  Total time spent today 15 minutes  Greater than 50% of total time was spent with the patient and / or family counseling and / or coordination of care       Charles Kam DO

## 2023-05-25 LAB
BUPRENORPHINE UR CFM-MCNC: 388 NG/ML
BUPRENORPHINE UR QL CFM: NORMAL NG/ML
BUPRENORPHINE UR QL CFM: POSITIVE
BUPRENORPHINE+NOR UR QL: POSITIVE
NORBUPRENORPHINE UR CFM-MCNC: 984 NG/ML
NORBUPRENORPHINE UR QL CFM: POSITIVE

## 2023-06-27 ENCOUNTER — SOCIAL WORK (OUTPATIENT)
Dept: PSYCHIATRY | Facility: CLINIC | Age: 58
End: 2023-06-27
Payer: MEDICARE

## 2023-06-27 ENCOUNTER — OFFICE VISIT (OUTPATIENT)
Dept: OTHER | Age: 58
End: 2023-06-27

## 2023-06-27 VITALS
WEIGHT: 135.2 LBS | DIASTOLIC BLOOD PRESSURE: 81 MMHG | HEART RATE: 101 BPM | HEIGHT: 62 IN | SYSTOLIC BLOOD PRESSURE: 174 MMHG | BODY MASS INDEX: 24.88 KG/M2

## 2023-06-27 DIAGNOSIS — F11.20 OPIOID USE DISORDER, MODERATE, DEPENDENCE (HCC): ICD-10-CM

## 2023-06-27 DIAGNOSIS — F32.A ANXIETY AND DEPRESSION: Primary | ICD-10-CM

## 2023-06-27 DIAGNOSIS — G47.9 SLEEP DIFFICULTIES: ICD-10-CM

## 2023-06-27 DIAGNOSIS — F11.20 OPIOID USE DISORDER, SEVERE, DEPENDENCE (HCC): Primary | ICD-10-CM

## 2023-06-27 DIAGNOSIS — F41.9 ANXIETY AND DEPRESSION: Primary | ICD-10-CM

## 2023-06-27 PROCEDURE — 90834 PSYTX W PT 45 MINUTES: CPT

## 2023-06-27 RX ORDER — LANOLIN ALCOHOL/MO/W.PET/CERES
6 CREAM (GRAM) TOPICAL
Qty: 60 TABLET | Refills: 0 | Status: SHIPPED | OUTPATIENT
Start: 2023-06-27 | End: 2023-07-27

## 2023-06-27 RX ORDER — BUPRENORPHINE AND NALOXONE 8; 2 MG/1; MG/1
8 FILM, SOLUBLE BUCCAL; SUBLINGUAL 2 TIMES DAILY
Qty: 60 FILM | Refills: 0 | Status: SHIPPED | OUTPATIENT
Start: 2023-06-27 | End: 2023-07-27

## 2023-06-27 NOTE — ASSESSMENT & PLAN NOTE
· Patient continues to report difficulties with falling asleep, trial on melatonin 3 mg HS  Will trial an increase of medication  If still reporting sleep difficulties will look into other medications such as Trazodone

## 2023-06-27 NOTE — BH TREATMENT PLAN
"[address]  1965     Date of Initial Psychotherapy Assessment:4/20/12   Date of Current Treatment Plan: 06/27/23  Treatment Plan Target Date: 12/27/23  Treatment Plan Expiration Date: 6/27/23    Diagnosis:   1  Anxiety and depression        2  Opioid use disorder, moderate, dependence (HCC)            Area(s) of Need: Mood regulation and stress management     Long Term Goal 1 (in the client's own words): I would like to be successful in managing my mood and stress  Stage of Change: Action    Target Date for completion: 11/27/23     Anticipated therapeutic modalities: Motivational Interviewing     People identified to complete this goal: Jennifer Spine      Objective 1: (identify the means of measuring success in meeting the objective):     1  Vanessa will maintain at least 95% medication adherence with her medications  2   Vanessa will will maintain a \"low to mild\" level of anxiety and depression, as measured on the PHQ-9 and WANDER-7 screening tools  I am currently under the care of a [facility] psychiatric provider: no    My [facility] psychiatric provider is: N/A PCP is prescribing medication Ramses Segundo    I am currently taking psychiatric medications: Yes, as prescribed    I feel that I will be ready for discharge from mental health care when I reach the following (measurable goal/objective): Once I feel successful in managing my mood and stress levels  For children and adults who have a legal guardian:   Has there been any change to custody orders and/or guardianship status? No  If yes, attach updated documentation  I have created my Crisis Plan and have been offered a copy of this plan    [address]: Diagnosis and Treatment Plan explained to [address] [place] [address] acknowledges an understanding of their diagnosis  Nabeel Azul agrees to this treatment plan      I have been offered a copy " of this Treatment Plan  yes

## 2023-06-27 NOTE — PSYCH
"Behavioral Health Psychotherapy Progress Note    Psychotherapy Provided: Individual Psychotherapy     Encounter Diagnoses   Name Primary? • Anxiety and depression Yes   • Opioid use disorder, moderate, dependence ([address] )          Goals addressed in session: Goal 1     DATA: Vanessa presented for her follow up today; it has been some time since she was able to be seen  Vanessa states she is doing better overall; she reports no cravings or urges at this time  Vanessa states she is trying to eat more and gain weight since she had lost so much to her use  Ardia Aver says she is taking her medication routinely with no issues  Vanessa was able to complete her treatment and crisis plan during this session  During this session, this clinician used the following therapeutic modalities: Engagement Strategies, Client-centered Therapy, Mindfulness-based Strategies, Motivational Interviewing, Solution-Focused Therapy and Supportive Psychotherapy    Substance Abuse was addressed during this session  If the client is diagnosed with a co-occurring substance use disorder, please indicate any changes in the frequency or amount of use: Vanessa endorses no substance use at this time; endorses being in recovery for 4 months   Stage of change for addressing substance use diagnoses: Maintenance    ASSESSMENT:  Joy Rehman presents with a Euthymic/ normal mood  her affect is Normal range and intensity, which is congruent, with her mood and the content of the session  The client has made progress on their goals  Joy Rehman presents with a none risk of suicide, none risk of self-harm, and none risk of harm to others  For any risk assessment that surpasses a \"low\" rating, a safety plan must be developed  A safety plan was indicated: no  If yes, describe in detail     PLAN: Between sessions, Joy Rehman will work on her treatment goals   At the next session, the therapist will use Client-centered Therapy, Existential Therapy, " Mindfulness-based Strategies, Motivational Interviewing, Solution-Focused Therapy and Supportive Psychotherapy to address mood lability in reaction to life stressors       Behavioral Health Treatment Plan and Discharge Planning: Sea Gil is aware of and agrees to continue to work on their treatment plan  They have identified and are working toward their discharge goals   yes    Visit start and stop times:    06/27/23  Start Time: 1014  Stop Time: 1055  Total Visit Time: 41 minutes

## 2023-06-27 NOTE — PROGRESS NOTES
[facility] Program    Progress Note  Leah Noble 59 y o  female MRN: [id number]  @ Encounter: [id number]      1  Opioid use disorder, severe, dependence ([facility] [address] )  Assessment & Plan:  • Continues to do well on maintenance dosing  She did discuss in the next upcoming appointments that she may want to start decreasing her dosage to 1 5 films daily instead of 2 films daily   • Denies any cravings or use  • Reviewed urine bup that was obtained last visit- shows compliance with treatment  • Will continue current treatment plan  • F/U in 1 month     Orders:  -     buprenorphine-naloxone (Suboxone) 8-2 mg; Place 1 Film (8 mg total) under the tongue 2 (two) times a day    2  Sleep difficulties  Assessment & Plan:  · Patient continues to report difficulties with falling asleep, trial on melatonin 3 mg HS  Will trial an increase of medication  If still reporting sleep difficulties will look into other medications such as Trazodone  Orders:  -     melatonin 3 mg; Take 2 tablets (6 mg total) by mouth daily at bedtime        Support Services  Case Management and Certified  are following  Patient was referred to the [facility] Certified Addiction Counselors: Yes  The patient is actively engaged with the [facility] Certified Addiction Counselor’s psychotherapy plan: Yes    buprenorphine-naloxone      PDMP reviewed:     PDMP Review       Value Time User    PDMP Reviewed  Yes 6/27/2023 11:01 AM Dulce Yates PA-C          SUBJECTIVE  Seen and examined in office  Reports that she is doing well with current suboxone dosage  Denies any cravings or reoccurence of use  Did discuss that in the future she does not want to be on suboxone long term  Still continues to report difficulties with sleep, specifically falling asleep         Drug cravings: none  Motivation to continue treatment: yes- continues to remain engaged in [place]       Current Outpatient Medications:   •  albuterol (2 5 mg/3 mL) 0 083 % nebulizer solution, Take 1 vial (2 5 mg total) by nebulization every 6 (six) hours as needed for wheezing or shortness of breath for up to 25 doses, Disp: 75 mL, Rfl: 0  •  amLODIPine (NORVASC) 5 mg tablet, Take 1 tablet (5 mg total) by mouth daily Do not start before February 19, 2023 , Disp: 30 tablet, Rfl: 0  •  anastrozole (ARIMIDEX) 1 mg tablet, Take 1 tablet (1 mg total) by mouth daily, Disp: 90 tablet, Rfl: 3  •  aspirin (ECOTRIN LOW STRENGTH) 81 mg EC tablet, Take 81 mg by mouth daily, Disp: , Rfl:   •  atorvastatin (LIPITOR) 20 mg tablet, 20 mg in the morning, Disp: , Rfl:   •  budesonide-formoterol (SYMBICORT) 160-4 5 mcg/act inhaler, Inhale 2 puffs 2 (two) times a day Rinse mouth after use , Disp: 10 2 g, Rfl: 0  •  buprenorphine-naloxone (Suboxone) 8-2 mg, Place 1 Film (8 mg total) under the tongue 2 (two) times a day, Disp: 60 Film, Rfl: 0  •  buPROPion (WELLBUTRIN) 75 mg tablet, Take 75 mg by mouth once, Disp: , Rfl:   •  busPIRone (BUSPAR) 10 mg tablet, Take 10 mg by mouth 2 (two) times a day, Disp: , Rfl:   •  melatonin 3 mg, Take 2 tablets (6 mg total) by mouth daily at bedtime, Disp: 60 tablet, Rfl: 0  •  albuterol (ACCUNEB) 1 25 MG/3ML nebulizer solution, Take 3 mL by nebulization every 6 (six) hours as needed for wheezing, Disp: 75 mL, Rfl: 0  •  albuterol (PROVENTIL HFA,VENTOLIN HFA) 90 mcg/act inhaler, Inhale 2 puffs every 6 (six) hours as needed for wheezing, Disp: 1 Inhaler, Rfl: 0  •  cholecalciferol (VITAMIN D3) 1,000 units tablet, Take 1,000 Units by mouth daily, Disp: , Rfl:   •  hydrochlorothiazide (HYDRODIURIL) 25 mg tablet, Take 1 tablet (25 mg total) by mouth daily, Disp: 30 tablet, Rfl: 0  •  hydrocortisone (ANUSOL-HC) 2 5 % rectal cream, Apply rectally 2 times daily, Disp: 28 35 g, Rfl: 0  •  ibuprofen (MOTRIN) 600 mg tablet, Take 1 tablet (600 mg total) by mouth every 8 (eight) hours as needed for mild pain, Disp: 90 tablet, Rfl: 1  •  ipratropium (ATROVENT) 0 02 % nebulizer solution, Take 1 vial (0 5 mg total) by nebulization 4 (four) times a day, Disp: 25 vial, Rfl: 0  •  naproxen (EC NAPROSYN) 500 MG EC tablet, Take 1 tablet (500 mg total) by mouth 2 (two) times a day with meals (Patient not taking: Reported on 6/11/2021), Disp: 10 tablet, Rfl: 0  •  omeprazole (PriLOSEC) 20 mg delayed release capsule, Take 20 mg by mouth daily, Disp: , Rfl:   •  phenazopyridine (PYRIDIUM) 200 mg tablet, Take 1 tablet (200 mg total) by mouth 3 (three) times a day, Disp: 6 tablet, Rfl: 0  •  polyethylene glycol (MIRALAX) 17 g packet, Take 17 g by mouth daily for 28 days (Patient not taking: Reported on 6/11/2021), Disp: 28 each, Rfl: 0    Objective     Vitals:    06/27/23 1044   BP: (!) 174/81   Pulse: 101       Physical Exam  Constitutional:       General: She is not in acute distress  Appearance: She is not diaphoretic  Pulmonary:      Effort: Pulmonary effort is normal    Neurological:      Mental Status: She is alert  Coordination: Coordination is intact  Gait: Gait is intact  Psychiatric:         Attention and Perception: Attention normal          Mood and Affect: Mood is not anxious or depressed  Behavior: Behavior normal  Behavior is cooperative  Lab Results:   Results from last 6 Months   Lab Units 02/18/23  0551   WBC Thousand/uL 9 82   HEMOGLOBIN g/dL 11 2*   HEMATOCRIT % 33 9*   PLATELETS Thousands/uL 253      Results from last 6 Months   Lab Units 02/18/23  0551 02/17/23  0509   POTASSIUM mmol/L 3 5 3 7   CHLORIDE mmol/L 100 103   CO2 mmol/L 26 29   BUN mg/dL 15 19   CREATININE mg/dL 0 68 0 73   CALCIUM mg/dL 8 9 8 7   ALBUMIN g/dL  --  3 5   ALK PHOS U/L  --  85   ALT U/L  --  35*   AST U/L  --  38*     Results from last 6 Months   Lab Units 02/17/23  0509   HEP B S AG  Non-reactive   HEP A IGM  Non-reactive   HEP C AB  Non-reactive   HEP B C IGM  Non-reactive                     Counseling / Coordination of Care  Total time spent today 30 minutes  Greater than 50% of total time was spent with the patient and / or family counseling and / or coordination of care       Gato Doyle PA-C

## 2023-06-27 NOTE — BH CRISIS PLAN
"Client Name: Sandra Castillo       Client YOB: 1965  : 1965    Treatment Team (include name and contact information):     Psychotherapist: Flaquita Caicedo    Psychiatrist: N/A    Release of information completed: no     N/A    Release of information completed: no    Other (Specify Role): Alee ROLDAN     Release of information completed: no        Healthcare Provider  Alee ROLDAN     Type of Plan   * Child plans (children 15 yo and younger) must be completed and signed by the child's legal guardian   * Plans for all individuals 15 yo and above must be signed by the client  Plan Type: adolescent/adult (14 and over) Initial      My Personal Strengths are (in the client's own words):  \"I feel that I am following up with all of my appointments and health needs\"  The stressors and triggers that may put me at risk are:  no significant stressors    Coping skills I can use to keep myself calm and safe:  Call a friend or family member and [place]/meditate    Coping skills/supports I can use to maintain abstinence from substance use:   family support    The people that provide me with help and support: (Include name, contact, and how they can help)   Support person #1: Andreas Heredia    * Phone number: in cell phone [phone]    * How can they help me?  Given my advice and being a support        In the past, the following has helped me in times of crisis:    Taking medications, Talking to a professional on the telephone, Calling a family member and Praying or meditating      If it is an emergency and you need immediate help, call     If there is a possibility of danger to yourself or others, call the following crisis hotline resources:     Adult Crisis Numbers  Suicide Prevention Hotline - Dial   [facility]: [address]: [address]: [address]: [address] [place] [address] Avenue: " [address] [facility]: [address] [place] [address] Avenue: [address] [place] [address] St: [phone] (daytime)  [phone] (after hours, weekends, holidays)     Child/Adolescent Crisis Numbers   [facility]: [address]: [phone]   [facility]: [phone]   [facility]: [phone]    Please note: Some [facility] do not have a separate number for Child/Adolescent specific crisis  If your county is not listed under Child/Adolescent, please call the adult number for your county     National Talk to Text Line   All Ages - [phone]    In the event your feelings become unmanageable, and you cannot reach your support system, you will call 911 immediately or go to the nearest hospital emergency room

## 2023-06-27 NOTE — ASSESSMENT & PLAN NOTE
• Continues to do well on maintenance dosing  She did discuss in the next upcoming appointments that she may want to start decreasing her dosage to 1 5 films daily instead of 2 films daily   • Denies any cravings or use  • Reviewed urine bup that was obtained last visit- shows compliance with treatment     • Will continue current treatment plan  • F/U in 1 month

## 2023-07-25 ENCOUNTER — OFFICE VISIT (OUTPATIENT)
Dept: OTHER | Age: 58
End: 2023-07-25

## 2023-07-25 VITALS
SYSTOLIC BLOOD PRESSURE: 179 MMHG | HEART RATE: 102 BPM | BODY MASS INDEX: 25.4 KG/M2 | WEIGHT: 138 LBS | DIASTOLIC BLOOD PRESSURE: 73 MMHG | HEIGHT: 62 IN

## 2023-07-25 DIAGNOSIS — G47.9 SLEEP DIFFICULTIES: ICD-10-CM

## 2023-07-25 DIAGNOSIS — F11.20 OPIOID USE DISORDER, SEVERE, DEPENDENCE (HCC): Primary | ICD-10-CM

## 2023-07-25 RX ORDER — TRAZODONE HYDROCHLORIDE 50 MG/1
50 TABLET ORAL
Qty: 30 TABLET | Refills: 0 | Status: SHIPPED | OUTPATIENT
Start: 2023-07-25 | End: 2023-08-24

## 2023-07-25 RX ORDER — BUPRENORPHINE AND NALOXONE 8; 2 MG/1; MG/1
8 FILM, SOLUBLE BUCCAL; SUBLINGUAL 2 TIMES DAILY
Qty: 60 FILM | Refills: 0 | Status: SHIPPED | OUTPATIENT
Start: 2023-07-25 | End: 2023-08-24

## 2023-07-25 NOTE — PROGRESS NOTES
[facility] Program    Progress Note  Gilberto Colón 59 y.o. female MRN: [id number]  @ Encounter: [id number]      1. Opioid use disorder, severe, dependence ([address])  Assessment & Plan:  • Continues to do well on maintenance dosing. She did discuss in the next upcoming appointments that she may want to start decreasing her dosage to 1.5 films daily instead of 2 films daily. Wants to pursue this next month. • Denies any cravings or use  • Will continue current treatment plan  • F/U in 1 month     Orders:  -     buprenorphine-naloxone (Suboxone) 8-2 mg; Place 1 Film (8 mg total) under the tongue 2 (two) times a day    2. Sleep difficulties  Assessment & Plan:  · Currently prescribed Melatonin  6 mg HS - patient reports that she has been taking 12 mg of Melatonin at night. Discussed the misuse of this medication and both agreed that we will no longer be prescribing this medication  · Will Transition to Trazodone and urged patient to follow up with PCP in the interim     Orders:  -     traZODone (DESYREL) 50 mg tablet; Take 1 tablet (50 mg total) by mouth daily at bedtime        Support Services  Case Management and Certified  are following. Patient was referred to the [facility] Certified Addiction Counselors: Yes  The patient is actively engaged with the [facility] Certified Addiction Counselor’s psychotherapy plan: Yes    buprenorphine-naloxone      PDMP reviewed:     PDMP Review       Value Time User    PDMP Reviewed  Yes 7/25/2023 10:55 AM Riky Herrera PA-C          SUBJECTIVE  Patient was seen in office today. She was upset that she was late to her appointment and states that she is having anxiety over this. Last visit patient reports to wanting to decrease her suboxone dosage, however patient verbalized that she would want to pursue this at next visit. Denies any cravings. We did discuss her sleep difficulties.  She states that she has been taking 12 mg of melatonin at night in order to sleep. Discussed that this is a misuse of the medication and risks associated with misuse especially given her age. Patient verbalized understanding and is in agreement with discontinuing this medication and transitioning to trazodone.      Drug cravings: no  Motivation to continue treatment: yes       Current Outpatient Medications:   •  buprenorphine-naloxone (Suboxone) 8-2 mg, Place 1 Film (8 mg total) under the tongue 2 (two) times a day, Disp: 60 Film, Rfl: 0  •  traZODone (DESYREL) 50 mg tablet, Take 1 tablet (50 mg total) by mouth daily at bedtime, Disp: 30 tablet, Rfl: 0  •  albuterol (2.5 mg/3 mL) 0.083 % nebulizer solution, Take 1 vial (2.5 mg total) by nebulization every 6 (six) hours as needed for wheezing or shortness of breath for up to 25 doses, Disp: 75 mL, Rfl: 0  •  albuterol (ACCUNEB) 1.25 MG/3ML nebulizer solution, Take 3 mL by nebulization every 6 (six) hours as needed for wheezing, Disp: 75 mL, Rfl: 0  •  albuterol (PROVENTIL HFA,VENTOLIN HFA) 90 mcg/act inhaler, Inhale 2 puffs every 6 (six) hours as needed for wheezing, Disp: 1 Inhaler, Rfl: 0  •  amLODIPine (NORVASC) 5 mg tablet, Take 1 tablet (5 mg total) by mouth daily Do not start before February 19, 2023., Disp: 30 tablet, Rfl: 0  •  anastrozole (ARIMIDEX) 1 mg tablet, Take 1 tablet (1 mg total) by mouth daily, Disp: 90 tablet, Rfl: 3  •  aspirin (ECOTRIN LOW STRENGTH) 81 mg EC tablet, Take 81 mg by mouth daily, Disp: , Rfl:   •  atorvastatin (LIPITOR) 20 mg tablet, 20 mg in the morning, Disp: , Rfl:   •  budesonide-formoterol (SYMBICORT) 160-4.5 mcg/act inhaler, Inhale 2 puffs 2 (two) times a day Rinse mouth after use., Disp: 10.2 g, Rfl: 0  •  buPROPion (WELLBUTRIN) 75 mg tablet, Take 75 mg by mouth once, Disp: , Rfl:   •  busPIRone (BUSPAR) 10 mg tablet, Take 10 mg by mouth 2 (two) times a day, Disp: , Rfl:   •  cholecalciferol (VITAMIN D3) 1,000 units tablet, Take 1,000 Units by mouth daily, Disp: , Rfl:   •  hydrochlorothiazide (HYDRODIURIL) 25 mg tablet, Take 1 tablet (25 mg total) by mouth daily, Disp: 30 tablet, Rfl: 0  •  hydrocortisone (ANUSOL-HC) 2.5 % rectal cream, Apply rectally 2 times daily, Disp: 28.35 g, Rfl: 0  •  ibuprofen (MOTRIN) 600 mg tablet, Take 1 tablet (600 mg total) by mouth every 8 (eight) hours as needed for mild pain, Disp: 90 tablet, Rfl: 1  •  ipratropium (ATROVENT) 0.02 % nebulizer solution, Take 1 vial (0.5 mg total) by nebulization 4 (four) times a day, Disp: 25 vial, Rfl: 0  •  naproxen (EC NAPROSYN) 500 MG EC tablet, Take 1 tablet (500 mg total) by mouth 2 (two) times a day with meals (Patient not taking: Reported on 6/11/2021), Disp: 10 tablet, Rfl: 0  •  omeprazole (PriLOSEC) 20 mg delayed release capsule, Take 20 mg by mouth daily, Disp: , Rfl:   •  phenazopyridine (PYRIDIUM) 200 mg tablet, Take 1 tablet (200 mg total) by mouth 3 (three) times a day, Disp: 6 tablet, Rfl: 0  •  polyethylene glycol (MIRALAX) 17 g packet, Take 17 g by mouth daily for 28 days (Patient not taking: Reported on 6/11/2021), Disp: 28 each, Rfl: 0    Objective     Vitals:    07/25/23 1122   BP: (!) 179/73   Pulse: 102       Physical Exam  Constitutional:       General: She is not in acute distress. Appearance: She is not diaphoretic. Eyes:      General: No scleral icterus. Pupils: Pupils are equal, round, and reactive to light. Cardiovascular:      Rate and Rhythm: Normal rate and regular rhythm. Neurological:      Mental Status: She is oriented to person, place, and time. Psychiatric:         Mood and Affect: Mood is anxious. Mood is not depressed.               Lab Results:   Results from last 6 Months   Lab Units 02/18/23  0551   WBC Thousand/uL 9.82   HEMOGLOBIN g/dL 11.2*   HEMATOCRIT % 33.9*   PLATELETS Thousands/uL 253      Results from last 6 Months   Lab Units 02/18/23  0551 02/17/23  0509   POTASSIUM mmol/L 3.5 3.7   CHLORIDE mmol/L 100 103   CO2 mmol/L 26 29   BUN mg/dL 15 19   CREATININE mg/dL 0.68 0.73 CALCIUM mg/dL 8.9 8.7   ALBUMIN g/dL  --  3.5   ALK PHOS U/L  --  85   ALT U/L  --  35*   AST U/L  --  38*     Results from last 6 Months   Lab Units 02/17/23  0509   HEP B S AG  Non-reactive   HEP A IGM  Non-reactive   HEP C AB  Non-reactive   HEP B C IGM  Non-reactive                     Counseling / Coordination of Care  Total time spent today 30 minutes. Greater than 50% of total time was spent with the patient and / or family counseling and / or coordination of care.      Arely , PA-C

## 2023-07-25 NOTE — ASSESSMENT & PLAN NOTE
• Continues to do well on maintenance dosing. She did discuss in the next upcoming appointments that she may want to start decreasing her dosage to 1.5 films daily instead of 2 films daily. Wants to pursue this next month.    • Denies any cravings or use  • Will continue current treatment plan  • F/U in 1 month

## 2023-07-25 NOTE — ASSESSMENT & PLAN NOTE
· Currently prescribed Melatonin  6 mg HS - patient reports that she has been taking 12 mg of Melatonin at night.  Discussed the misuse of this medication and both agreed that we will no longer be prescribing this medication  · Will Transition to Trazodone and urged patient to follow up with PCP in the interim

## 2023-08-24 ENCOUNTER — OFFICE VISIT (OUTPATIENT)
Dept: OTHER | Age: 58
End: 2023-08-24

## 2023-08-24 VITALS
HEIGHT: 62 IN | HEART RATE: 95 BPM | BODY MASS INDEX: 25.73 KG/M2 | DIASTOLIC BLOOD PRESSURE: 84 MMHG | SYSTOLIC BLOOD PRESSURE: 164 MMHG | WEIGHT: 139.8 LBS

## 2023-08-24 DIAGNOSIS — F11.20 OPIOID USE DISORDER, SEVERE, DEPENDENCE (HCC): ICD-10-CM

## 2023-08-24 DIAGNOSIS — G47.9 SLEEP DIFFICULTIES: ICD-10-CM

## 2023-08-24 RX ORDER — BUPRENORPHINE AND NALOXONE 8; 2 MG/1; MG/1
8 FILM, SOLUBLE BUCCAL; SUBLINGUAL 2 TIMES DAILY
Qty: 60 FILM | Refills: 0 | Status: SHIPPED | OUTPATIENT
Start: 2023-08-24 | End: 2023-09-23

## 2023-08-24 RX ORDER — TRAZODONE HYDROCHLORIDE 50 MG/1
50 TABLET ORAL
Qty: 30 TABLET | Refills: 3 | Status: SHIPPED | OUTPATIENT
Start: 2023-08-24 | End: 2023-12-22

## 2023-08-24 NOTE — ASSESSMENT & PLAN NOTE
· Doing well with MOUD  · No cravings or use  · She does not want to decrease dose at this point as she was contemplating earlier.  I agree  · Will continue current treatment plan

## 2023-08-24 NOTE — PROGRESS NOTES
[facility] Program    Progress Note  Cherie Alonzo 59 y.o. female MRN: [id number]  @ Encounter: [id number]      1. Opioid use disorder, severe, dependence ([address])  Assessment & Plan:  · Doing well with MOUD  · No cravings or use  · She does not want to decrease dose at this point as she was contemplating earlier. I agree  · Will continue current treatment plan    Orders:  -     buprenorphine-naloxone (Suboxone) 8-2 mg; Place 1 Film (8 mg total) under the tongue 2 (two) times a day    2. Sleep difficulties  -     traZODone (DESYREL) 50 mg tablet; Take 1 tablet (50 mg total) by mouth daily at bedtime        Support Services  Case Management and Certified  are following. Patient was referred to the [facility] Certified Addiction Counselors: Yes  The patient is actively engaged with the [facility] Certified Addiction Counselor’s psychotherapy plan: If no, explain: MOUD and CRS services only    buprenorphine-naloxone      PDMP reviewed:     PDMP Review       Value Time User    PDMP Reviewed  Yes 8/24/2023  8:42 AM Kb Ch, DO          SUBJECTIVE  Doing well. No complaints.     Drug cravings: no  Motivation to continue treatment: high      Current Outpatient Medications:   •  buprenorphine-naloxone (Suboxone) 8-2 mg, Place 1 Film (8 mg total) under the tongue 2 (two) times a day, Disp: 60 Film, Rfl: 0  •  traZODone (DESYREL) 50 mg tablet, Take 1 tablet (50 mg total) by mouth daily at bedtime, Disp: 30 tablet, Rfl: 3  •  albuterol (2.5 mg/3 mL) 0.083 % nebulizer solution, Take 1 vial (2.5 mg total) by nebulization every 6 (six) hours as needed for wheezing or shortness of breath for up to 25 doses, Disp: 75 mL, Rfl: 0  •  albuterol (ACCUNEB) 1.25 MG/3ML nebulizer solution, Take 3 mL by nebulization every 6 (six) hours as needed for wheezing, Disp: 75 mL, Rfl: 0  •  albuterol (PROVENTIL HFA,VENTOLIN HFA) 90 mcg/act inhaler, Inhale 2 puffs every 6 (six) hours as needed for wheezing, Disp: 1 Inhaler, Rfl: 0  •  amLODIPine (NORVASC) 5 mg tablet, Take 1 tablet (5 mg total) by mouth daily Do not start before February 19, 2023., Disp: 30 tablet, Rfl: 0  •  anastrozole (ARIMIDEX) 1 mg tablet, Take 1 tablet (1 mg total) by mouth daily, Disp: 90 tablet, Rfl: 3  •  aspirin (ECOTRIN LOW STRENGTH) 81 mg EC tablet, Take 81 mg by mouth daily, Disp: , Rfl:   •  atorvastatin (LIPITOR) 20 mg tablet, 20 mg in the morning, Disp: , Rfl:   •  budesonide-formoterol (SYMBICORT) 160-4.5 mcg/act inhaler, Inhale 2 puffs 2 (two) times a day Rinse mouth after use., Disp: 10.2 g, Rfl: 0  •  buPROPion (WELLBUTRIN) 75 mg tablet, Take 75 mg by mouth once, Disp: , Rfl:   •  busPIRone (BUSPAR) 10 mg tablet, Take 10 mg by mouth 2 (two) times a day, Disp: , Rfl:   •  cholecalciferol (VITAMIN D3) 1,000 units tablet, Take 1,000 Units by mouth daily, Disp: , Rfl:   •  hydrochlorothiazide (HYDRODIURIL) 25 mg tablet, Take 1 tablet (25 mg total) by mouth daily, Disp: 30 tablet, Rfl: 0  •  hydrocortisone (ANUSOL-HC) 2.5 % rectal cream, Apply rectally 2 times daily, Disp: 28.35 g, Rfl: 0  •  ibuprofen (MOTRIN) 600 mg tablet, Take 1 tablet (600 mg total) by mouth every 8 (eight) hours as needed for mild pain, Disp: 90 tablet, Rfl: 1  •  ipratropium (ATROVENT) 0.02 % nebulizer solution, Take 1 vial (0.5 mg total) by nebulization 4 (four) times a day, Disp: 25 vial, Rfl: 0  •  naproxen (EC NAPROSYN) 500 MG EC tablet, Take 1 tablet (500 mg total) by mouth 2 (two) times a day with meals (Patient not taking: Reported on 6/11/2021), Disp: 10 tablet, Rfl: 0  •  omeprazole (PriLOSEC) 20 mg delayed release capsule, Take 20 mg by mouth daily, Disp: , Rfl:   •  phenazopyridine (PYRIDIUM) 200 mg tablet, Take 1 tablet (200 mg total) by mouth 3 (three) times a day, Disp: 6 tablet, Rfl: 0  •  polyethylene glycol (MIRALAX) 17 g packet, Take 17 g by mouth daily for 28 days (Patient not taking: Reported on 6/11/2021), Disp: 28 each, Rfl: 0    Objective Vitals:    08/24/23 1046   BP: 164/84   Pulse: 95       Physical Exam  Constitutional:       General: She is not in acute distress. Appearance: Normal appearance. Cardiovascular:      Rate and Rhythm: Normal rate. Pulmonary:      Effort: Pulmonary effort is normal.   Neurological:      Mental Status: She is alert and oriented to person, place, and time. Psychiatric:         Mood and Affect: Mood normal.         Behavior: Behavior normal.              Lab Results:                              Counseling / Coordination of Care  Total time spent today 15 minutes. Greater than 50% of total time was spent with the patient and / or family counseling and / or coordination of care.      Petey Suarez, DO

## 2023-09-19 ENCOUNTER — TELEPHONE (OUTPATIENT)
Dept: PSYCHIATRY | Facility: CLINIC | Age: 58
End: 2023-09-19

## 2023-09-26 ENCOUNTER — OFFICE VISIT (OUTPATIENT)
Dept: OTHER | Age: 58
End: 2023-09-26

## 2023-09-26 VITALS
BODY MASS INDEX: 26.43 KG/M2 | WEIGHT: 143.6 LBS | HEIGHT: 62 IN | DIASTOLIC BLOOD PRESSURE: 85 MMHG | HEART RATE: 79 BPM | SYSTOLIC BLOOD PRESSURE: 141 MMHG

## 2023-09-26 DIAGNOSIS — F11.20 OPIOID USE DISORDER, SEVERE, DEPENDENCE (HCC): Primary | ICD-10-CM

## 2023-09-26 RX ORDER — BUPRENORPHINE AND NALOXONE 8; 2 MG/1; MG/1
8 FILM, SOLUBLE BUCCAL; SUBLINGUAL 2 TIMES DAILY
Qty: 60 FILM | Refills: 0 | Status: SHIPPED | OUTPATIENT
Start: 2023-09-26

## 2023-09-26 NOTE — ASSESSMENT & PLAN NOTE
· Pt continues to do well. · No cravings or use  · Will continue current treatment  · Pt wants to consider starting to come down on dose  · Will consider going to 1.5 films per day next month.

## 2023-09-26 NOTE — PROGRESS NOTES
[facility] Program    Progress Note  Asia Whittington 59 y.o. female MRN: [id number]  @ Encounter: [id number]      1. Opioid use disorder, severe, dependence ([address])  Assessment & Plan:  · Pt continues to do well. · No cravings or use  · Will continue current treatment  · Pt wants to consider starting to come down on dose  · Will consider going to 1.5 films per day next month. Orders:  -     buprenorphine-naloxone (Suboxone) 8-2 mg; Place 1 Film (8 mg total) under the tongue 2 (two) times a day        Support Services  Case Management and Certified  are following. Patient was referred to the [facility] Certified Addiction Counselors: Yes  The patient is actively engaged with the [facility] Certified Addiction Counselor’s psychotherapy plan: If no, explain: MOUD/CRS only    buprenorphine-naloxone      PDMP reviewed:     PDMP Review       Value Time User    PDMP Reviewed  Yes 9/26/2023  8:35 AM Yazan Shown, DO          SUBJECTIVE  Doing well.  No complaints    Drug cravings: no  Motivation to continue treatment: high      Current Outpatient Medications:   •  albuterol (2.5 mg/3 mL) 0.083 % nebulizer solution, Take 1 vial (2.5 mg total) by nebulization every 6 (six) hours as needed for wheezing or shortness of breath for up to 25 doses, Disp: 75 mL, Rfl: 0  •  albuterol (ACCUNEB) 1.25 MG/3ML nebulizer solution, Take 3 mL by nebulization every 6 (six) hours as needed for wheezing, Disp: 75 mL, Rfl: 0  •  albuterol (PROVENTIL HFA,VENTOLIN HFA) 90 mcg/act inhaler, Inhale 2 puffs every 6 (six) hours as needed for wheezing, Disp: 1 Inhaler, Rfl: 0  •  anastrozole (ARIMIDEX) 1 mg tablet, Take 1 tablet (1 mg total) by mouth daily, Disp: 90 tablet, Rfl: 3  •  aspirin (ECOTRIN LOW STRENGTH) 81 mg EC tablet, Take 81 mg by mouth daily, Disp: , Rfl:   •  atorvastatin (LIPITOR) 20 mg tablet, 20 mg in the morning, Disp: , Rfl:   •  budesonide-formoterol (SYMBICORT) 160-4.5 mcg/act inhaler, Inhale 2 puffs 2 (two) times a day Rinse mouth after use., Disp: 10.2 g, Rfl: 0  •  buprenorphine-naloxone (Suboxone) 8-2 mg, Place 1 Film (8 mg total) under the tongue 2 (two) times a day, Disp: 60 Film, Rfl: 0  •  buPROPion (WELLBUTRIN) 75 mg tablet, Take 75 mg by mouth once, Disp: , Rfl:   •  busPIRone (BUSPAR) 10 mg tablet, Take 10 mg by mouth 2 (two) times a day, Disp: , Rfl:   •  cholecalciferol (VITAMIN D3) 1,000 units tablet, Take 1,000 Units by mouth daily, Disp: , Rfl:   •  hydrocortisone (ANUSOL-HC) 2.5 % rectal cream, Apply rectally 2 times daily, Disp: 28.35 g, Rfl: 0  •  ibuprofen (MOTRIN) 600 mg tablet, Take 1 tablet (600 mg total) by mouth every 8 (eight) hours as needed for mild pain, Disp: 90 tablet, Rfl: 1  •  ipratropium (ATROVENT) 0.02 % nebulizer solution, Take 1 vial (0.5 mg total) by nebulization 4 (four) times a day, Disp: 25 vial, Rfl: 0  •  omeprazole (PriLOSEC) 20 mg delayed release capsule, Take 20 mg by mouth daily, Disp: , Rfl:   •  traZODone (DESYREL) 50 mg tablet, Take 1 tablet (50 mg total) by mouth daily at bedtime, Disp: 30 tablet, Rfl: 3  •  amLODIPine (NORVASC) 5 mg tablet, Take 1 tablet (5 mg total) by mouth daily Do not start before February 19, 2023., Disp: 30 tablet, Rfl: 0  •  hydrochlorothiazide (HYDRODIURIL) 25 mg tablet, Take 1 tablet (25 mg total) by mouth daily, Disp: 30 tablet, Rfl: 0  •  naproxen (EC NAPROSYN) 500 MG EC tablet, Take 1 tablet (500 mg total) by mouth 2 (two) times a day with meals (Patient not taking: Reported on 6/11/2021), Disp: 10 tablet, Rfl: 0  •  phenazopyridine (PYRIDIUM) 200 mg tablet, Take 1 tablet (200 mg total) by mouth 3 (three) times a day (Patient not taking: Reported on 9/26/2023), Disp: 6 tablet, Rfl: 0  •  polyethylene glycol (MIRALAX) 17 g packet, Take 17 g by mouth daily for 28 days (Patient not taking: Reported on 6/11/2021), Disp: 28 each, Rfl: 0    Objective     Vitals:    09/26/23 1049   BP: 141/85   Pulse: 79       Physical Exam  Constitutional: General: She is not in acute distress. Appearance: Normal appearance. Cardiovascular:      Rate and Rhythm: Normal rate. Pulmonary:      Effort: Pulmonary effort is normal.   Neurological:      Mental Status: She is alert and oriented to person, place, and time. Psychiatric:         Mood and Affect: Mood normal.         Behavior: Behavior normal.              Lab Results:                              Counseling / Coordination of Care  Total time spent today 15 minutes. Greater than 50% of total time was spent with the patient and / or family counseling and / or coordination of care.      Carlos Nick DO

## 2023-10-04 ENCOUNTER — TELEPHONE (OUTPATIENT)
Dept: HEMATOLOGY ONCOLOGY | Facility: CLINIC | Age: 58
End: 2023-10-04

## 2023-10-04 NOTE — TELEPHONE ENCOUNTER
I am reaching out regarding your appointment with Dr. Khari Mcneill on 11/9/23    There has been a change to the providers schedule, and we will need to reschedule your appointment. I left a voicemail explaining to patient that this appointment will need to be rescheduled due to a change in the providers schedule.  Patient was advised to call [facility] [phone] to reschedule

## 2023-10-10 ENCOUNTER — TELEPHONE (OUTPATIENT)
Dept: HEMATOLOGY ONCOLOGY | Facility: CLINIC | Age: 58
End: 2023-10-10

## 2023-10-10 NOTE — TELEPHONE ENCOUNTER
I called [facility] regarding an appointment that they have scheduled with Dr. Ghazala Bass scheduled on 11/10/23     I left a voicemail explaining to patient that this appointment will need to be rescheduled due to a change in the providers schedule. Patient was advised to call [facility] to reschedule.   Another attempt will be made to reschedule

## 2023-10-16 ENCOUNTER — TELEPHONE (OUTPATIENT)
Dept: HEMATOLOGY ONCOLOGY | Facility: CLINIC | Age: 58
End: 2023-10-16

## 2023-10-16 NOTE — LETTER
Vanessa,     We have made multiple attempts to contact you regarding your upcoming appointment with Dr. Leydi Silverio scheduled  11/10/23 . We have been unable to reach you by phone. Due to a change in the providers schedule, your appointment has been cancelled. At your earliest convenience, please contact the [address] [place] [address] at [phone] and we would be happy to assist you in rescheduling your appointment.      Thank you,  [facility]  Oncology [place]   [phone]

## 2023-10-16 NOTE — TELEPHONE ENCOUNTER
Appointment Change  Cancel, Reschedule, Change to Virtual      Who are you speaking with? LVM for patient   If it is not the patient, is the caller listed on the communication consent form? N/A   Which provider is the appointment scheduled with? Dr. Barbara Rand   When was the original appointment scheduled? Please list date and time 11/10/23 10:40AM   At which location is the appointment scheduled to take place? [place]   Was the appointment rescheduled? Was the appointment changed from an in person visit to a virtual visit? If so, please list the details of the change. No, LVM to inform patient of cancellation and advised patient to call and reschedule   What is the reason for the appointment change?  Provider

## 2023-10-17 ENCOUNTER — TELEPHONE (OUTPATIENT)
Dept: HEMATOLOGY ONCOLOGY | Facility: CLINIC | Age: 58
End: 2023-10-17

## 2023-10-17 NOTE — TELEPHONE ENCOUNTER
YASHIRA  DR glenn LYNCH   Who are you speaking with? Patient   If it is not the patient, are they listed on an active communication consent form? N/A   Is this a YASHIRA or DR glenn LYNCH YASHIRA   Which provider is patient currently scheduled or established with? Dr. Abdelrahman Navarro   What is the original appointment date and time? N/A   At which location is the appointment scheduled to take place? [place]   Which provider is the patient transitioning care to? Dr. Tonya Howard   What is the new appointment date and time? 11/29/23 1:20pm   At which location is the new appointment scheduled to take place? [place]   What is the reason for this change? Change in provider's schedule       Patient needs [employer] set up for this appointment and would like to be picked up at the following address. This address is the patient's daughter's house and the patient stays at her daughter's house frequently.        [facility], [address] [place] [address]

## 2023-10-25 DIAGNOSIS — Z17.0 MALIGNANT NEOPLASM OF RIGHT BREAST IN FEMALE, ESTROGEN RECEPTOR POSITIVE, UNSPECIFIED SITE OF BREAST: ICD-10-CM

## 2023-10-25 DIAGNOSIS — C50.911 MALIGNANT NEOPLASM OF RIGHT BREAST IN FEMALE, ESTROGEN RECEPTOR POSITIVE, UNSPECIFIED SITE OF BREAST: ICD-10-CM

## 2023-10-26 ENCOUNTER — OFFICE VISIT (OUTPATIENT)
Dept: OTHER | Age: 58
End: 2023-10-26

## 2023-10-26 VITALS
WEIGHT: 141.4 LBS | HEIGHT: 62 IN | HEART RATE: 98 BPM | BODY MASS INDEX: 26.02 KG/M2 | DIASTOLIC BLOOD PRESSURE: 87 MMHG | SYSTOLIC BLOOD PRESSURE: 165 MMHG

## 2023-10-26 DIAGNOSIS — F11.20 OPIOID USE DISORDER, SEVERE, DEPENDENCE (HCC): Primary | ICD-10-CM

## 2023-10-26 DIAGNOSIS — G47.9 SLEEP DIFFICULTIES: ICD-10-CM

## 2023-10-26 PROCEDURE — 80307 DRUG TEST PRSMV CHEM ANLYZR: CPT | Performed by: EMERGENCY MEDICINE

## 2023-10-26 RX ORDER — TRAZODONE HYDROCHLORIDE 50 MG/1
50 TABLET ORAL
Qty: 30 TABLET | Refills: 3 | Status: SHIPPED | OUTPATIENT
Start: 2023-10-26 | End: 2024-02-23

## 2023-10-26 RX ORDER — ANASTROZOLE 1 MG/1
1 TABLET ORAL DAILY
Qty: 90 TABLET | Refills: 2 | Status: SHIPPED | OUTPATIENT
Start: 2023-10-26

## 2023-10-26 RX ORDER — BUPRENORPHINE AND NALOXONE 8; 2 MG/1; MG/1
8 FILM, SOLUBLE BUCCAL; SUBLINGUAL 2 TIMES DAILY
Qty: 60 FILM | Refills: 0 | Status: SHIPPED | OUTPATIENT
Start: 2023-10-26

## 2023-10-26 NOTE — PROGRESS NOTES
SHARE Program    Progress Note  Brittany Dempsey 59 y.o. female MRN: [id number]  @ Encounter: [id number]      1. Opioid use disorder, severe, dependence ([address] )  Assessment & Plan:  Doing well. No cravigns or use  Not ready to decrease dose. Will do it on her timeline  Urine bup for routine diversion monitoring purposes. Pt's insurance does not allow [employer] screen    Orders:  -     buprenorphine-naloxone (Suboxone) 8-2 mg; Place 1 Film (8 mg total) under the tongue 2 (two) times a day  -     Suboxone    2. Sleep difficulties  -     traZODone (DESYREL) 50 mg tablet; Take 1 tablet (50 mg total) by mouth daily at bedtime          Support Services  Case Management and Certified  are following. Patient was referred to the [facility] Certified Addiction Counselors: Yes  The patient is actively engaged with the [facility] Certified Addiction Counselor’s psychotherapy plan: If no, explain: MOUD services only    buprenorphine-naloxone      PDMP reviewed:     PDMP Review         Value Time User    PDMP Reviewed  Yes 10/26/2023  8:35 AM Corrina Howell, DO            SUBJECTIVE  Doing well. No complaints.      Drug cravings: no  Motivation to continue treatment: high      Current Outpatient Medications:     buprenorphine-naloxone (Suboxone) 8-2 mg, Place 1 Film (8 mg total) under the tongue 2 (two) times a day, Disp: 60 Film, Rfl: 0    traZODone (DESYREL) 50 mg tablet, Take 1 tablet (50 mg total) by mouth daily at bedtime, Disp: 30 tablet, Rfl: 3    albuterol (2.5 mg/3 mL) 0.083 % nebulizer solution, Take 1 vial (2.5 mg total) by nebulization every 6 (six) hours as needed for wheezing or shortness of breath for up to 25 doses, Disp: 75 mL, Rfl: 0    albuterol (ACCUNEB) 1.25 MG/3ML nebulizer solution, Take 3 mL by nebulization every 6 (six) hours as needed for wheezing, Disp: 75 mL, Rfl: 0    albuterol (PROVENTIL HFA,VENTOLIN HFA) 90 mcg/act inhaler, Inhale 2 puffs every 6 (six) hours as needed for wheezing, Disp: 1 Inhaler, Rfl: 0    amLODIPine (NORVASC) 5 mg tablet, Take 1 tablet (5 mg total) by mouth daily Do not start before February 19, 2023., Disp: 30 tablet, Rfl: 0    anastrozole (ARIMIDEX) 1 mg tablet, TAKE ONE TABLET BY MOUTH DAILY, Disp: 90 tablet, Rfl: 2    aspirin (ECOTRIN LOW STRENGTH) 81 mg EC tablet, Take 81 mg by mouth daily, Disp: , Rfl:     atorvastatin (LIPITOR) 20 mg tablet, 20 mg in the morning, Disp: , Rfl:     budesonide-formoterol (SYMBICORT) 160-4.5 mcg/act inhaler, Inhale 2 puffs 2 (two) times a day Rinse mouth after use., Disp: 10.2 g, Rfl: 0    buPROPion (WELLBUTRIN) 75 mg tablet, Take 75 mg by mouth once, Disp: , Rfl:     busPIRone (BUSPAR) 10 mg tablet, Take 10 mg by mouth 2 (two) times a day, Disp: , Rfl:     cholecalciferol (VITAMIN D3) 1,000 units tablet, Take 1,000 Units by mouth daily, Disp: , Rfl:     hydrochlorothiazide (HYDRODIURIL) 25 mg tablet, Take 1 tablet (25 mg total) by mouth daily, Disp: 30 tablet, Rfl: 0    hydrocortisone (ANUSOL-HC) 2.5 % rectal cream, Apply rectally 2 times daily, Disp: 28.35 g, Rfl: 0    ibuprofen (MOTRIN) 600 mg tablet, Take 1 tablet (600 mg total) by mouth every 8 (eight) hours as needed for mild pain, Disp: 90 tablet, Rfl: 1    ipratropium (ATROVENT) 0.02 % nebulizer solution, Take 1 vial (0.5 mg total) by nebulization 4 (four) times a day, Disp: 25 vial, Rfl: 0    naproxen (EC NAPROSYN) 500 MG EC tablet, Take 1 tablet (500 mg total) by mouth 2 (two) times a day with meals (Patient not taking: Reported on 6/11/2021), Disp: 10 tablet, Rfl: 0    omeprazole (PriLOSEC) 20 mg delayed release capsule, Take 20 mg by mouth daily, Disp: , Rfl:     phenazopyridine (PYRIDIUM) 200 mg tablet, Take 1 tablet (200 mg total) by mouth 3 (three) times a day (Patient not taking: Reported on 9/26/2023), Disp: 6 tablet, Rfl: 0    polyethylene glycol (MIRALAX) 17 g packet, Take 17 g by mouth daily for 28 days (Patient not taking: Reported on 6/11/2021), Disp: 28 each, Rfl: 0    Objective     Vitals:    10/26/23 1038   BP: 165/87   Pulse: 98       Physical Exam  Constitutional:       General: She is not in acute distress. Appearance: Normal appearance. Cardiovascular:      Rate and Rhythm: Normal rate. Pulmonary:      Effort: Pulmonary effort is normal.   Neurological:      Mental Status: She is alert and oriented to person, place, and time. Psychiatric:         Mood and Affect: Mood normal.         Behavior: Behavior normal.              Lab Results:                              Counseling / Coordination of Care  Total time spent today 15 minutes. Greater than 50% of total time was spent with the patient and / or family counseling and / or coordination of care.      Della Valdez DO

## 2023-10-26 NOTE — ASSESSMENT & PLAN NOTE
Doing well. No cravigns or use  Not ready to decrease dose. Will do it on her timeline  Urine bup for routine diversion monitoring purposes.  Pt's insurance does not allow [facility] screen

## 2023-10-29 LAB
BUPRENORPHINE UR CFM-MCNC: 322 NG/ML
BUPRENORPHINE UR QL CFM: NORMAL NG/ML
BUPRENORPHINE UR QL CFM: POSITIVE
BUPRENORPHINE+NOR UR QL: POSITIVE
NORBUPRENORPHINE UR CFM-MCNC: 840 NG/ML
NORBUPRENORPHINE UR QL CFM: POSITIVE

## 2023-11-16 ENCOUNTER — TELEPHONE (OUTPATIENT)
Dept: OTHER | Age: 58
End: 2023-11-16

## 2023-11-16 NOTE — TELEPHONE ENCOUNTER
Vanessa called the MAT office to apologize for not showing up for her appt and to say that she no longer requires our services as she is now going to a Methadone clinic.     TEMIOTPE

## 2023-11-20 NOTE — PROGRESS NOTES
Hematology/Oncology Outpatient Office Note    Date of Service: 2023    [facility]ATOLOG[facility]  [phone]    Reason for Consultation:   Chief Complaint   Patient presents with    Follow-up       Cancer Stage at diagnosis: IIB    Referral Physician: No ref. provider found    Primary Care Physician:  YULI Domínguez     Nickname: Vanessa    Lives alone    Original ECO    Today's ECO    Goals and Barriers:  Current Goal: Minimize effects of disease burden, extend life. Barriers to accomplishing this: None    Patient's Capacity to Self Care:  Patient is able to self care      ASSESSMENT & PLAN      Diagnosis ICD-10-CM Associated Orders   1. History of cancer of right breast  Z85.3       2. Aromatase inhibitor use  Z79.811             This is a 59 y.o. c PMHx notable for post-menopause, opioid use disorder s/p Suboxone and now on Methadone, asthma, HTN, anxiety/depression, being seen in consultation for HR+ early stage breast cancer on adjuvant endocrine therapy     She was diagnosed 10/16/2013 and underwent R mastectomy and axillary LAD dissection. Oncotype dx was 62 and she had adjuvant AC followed by taxol completed 2014. Tamoxifen was given for 5 years until 2019 and then changed to Anastrozole with plans to complete 10 years by 2024    Discussion of decision making  Oncology history updated, accordingly, during this visit  Goals of care/patient communication  I discussed with the patient the clinical course leading up to their cancer diagnosis. I reviewed relevant office notes, imaging reports and pathology result as well. I told the patient that this is a case of curable disease and what this means. We discussed that the goal of anti-cancer therapy is to provide best quality of life, extend overall survival, and progression free survival as shown in clinical trials.  We also discussed that there might be a point when the cancer will no longer respond to this anti-neoplastic therapy  I explained the risks/benefits of the proposed cancer therapy: Anastrozole and after discussion including understanding risks of possible life-threatening complications and therapy-related malignancy development, informed consent for blood products and treatment has been verbally obtained. TNM/Staging At Diagnosis   [id number]  Cancer Staging   IIB  Disease Features/Tumor Markers/Genetics  Tumor Marker: n/a  Notable Path Features:   Treatment: Anastrozole  Other Supportive care:   Treatment Team Members  Surgeon  Rad Onc  Palliative  Labs  Diagnostics        Discussion of decision making    I personally reviewed the following lab results, the image studies, pathology, other specialty/physicians consult notes and recommendations, and outside medical records. I had a lengthy discussion with the patient and shared the work-up findings. We discussed the diagnosis and management plan as below. I spent 42 minutes reviewing the records (labs, clinician notes, outside records, medical history, ordering medicine/tests/procedures, interpreting the imaging/labs previously done) and coordination of care as well as direct time with the patient today, of which greater than 50% of the time was spent in counseling and coordination of care with the patient/family. Plan/Labs  Mammograms not done since 2022, recommended she f/u with Surg-Onc or survivorship clinic (referral placed)  Cont Anastrozole until 8/2024  CMP  DEXA due        Follow Up: 1 year    All questions were answered to the patient's satisfaction during this encounter. The patient knows the contact information for our office and knows to reach out for any relevant concerns related to this encounter.  They are to call for any temperature 100.4 or higher, new symptoms including but not restricted to shaking chills, decreased appetite, nausea, vomiting, diarrhea, increased fatigue, shortness of breath or chest pain, confusion, and not feeling the strength to come to the clinic. For all other listed problems and medical diagnosis in their chart - they are managed by PCP and/or other specialists, which the patient acknowledges. Thank you very much for your consultation and making us a part of this patient's care. We are continuing to follow closely with you. Please do not hesitate to reach out to me with any additional questions or concerns. Neftali Iqbal MD  Hematology & Medical Oncology Staff Physician             Disclaimer: This document was prepared using [employer] technology. If a word or phrase is confusing, or does not make sense, this is likely due to recognition error which was not discovered during this clinician's review. If you believe an error has occurred, please contact me through [facility] line for clifford? cation. ONCOLOGY HISTORY OF PRESENT ILLNESS        Oncology History    No history exists. SUBJECTIVE  (INTERVAL HISTORY)      Clotting History None   Bleeding History None   Cancer History R breast   Family Cancer History None                         Pain: none      I have reviewed the relevant past medical, surgical, social and family history. I have also reviewed allergies and medications for this patient. Review of Systems  Baseline weight: 140-145 lbs     A 10-point review of system was performed, pertinent positive and negative were detailed as above. Otherwise, the 10-point review of system was negative.       Past Medical History:   Diagnosis Date    Asthma     Breast cancer ([address])     Cancer ([address])     breast cancer    History of chemotherapy     Hypertension     Psychiatric disorder     depression and anxiety       Past Surgical History:   Procedure Laterality Date    MASTECTOMY  2014    Right side       Family History   Problem Relation Age of Onset    No Known Problems Mother     No Known Problems Father     No Known Problems Daughter     No Known Problems Maternal Grandmother     No Known Problems Maternal Grandfather     No Known Problems Paternal Grandmother     No Known Problems Paternal Grandfather     Colon cancer Neg Hx        Social History     Socioeconomic History    Marital status: Single     Spouse name: Not on file    Number of children: Not on file    Years of education: Not on file    Highest education level: Not on file   Occupational History    Not on file   Tobacco Use    Smoking status: Some Days     Packs/day: 0.00     Types: Cigarettes     Last attempt to quit: 2/1/2020     Years since quitting: 3.8    Smokeless tobacco: Never   Vaping Use    Vaping Use: Never used   Substance and Sexual Activity    Alcohol use: Not Currently    Drug use: No    Sexual activity: Not Currently   Other Topics Concern    Not on file   Social History Narrative    Not on file     Social Determinants of Health     Financial Resource Strain: Not on file   Food Insecurity: Not on file   Transportation Needs: Not on file   Physical Activity: Not on file   Stress: Not on file   Social Connections: Not on file   Intimate Partner Violence: Not on file   Housing Stability: Not on file       No Known Allergies    Current Outpatient Medications   Medication Sig Dispense Refill    albuterol (2.5 mg/3 mL) 0.083 % nebulizer solution Take 1 vial (2.5 mg total) by nebulization every 6 (six) hours as needed for wheezing or shortness of breath for up to 25 doses 75 mL 0    albuterol (ACCUNEB) 1.25 MG/3ML nebulizer solution Take 3 mL by nebulization every 6 (six) hours as needed for wheezing 75 mL 0    albuterol (PROVENTIL HFA,VENTOLIN HFA) 90 mcg/act inhaler Inhale 2 puffs every 6 (six) hours as needed for wheezing 1 Inhaler 0    anastrozole (ARIMIDEX) 1 mg tablet TAKE ONE TABLET BY MOUTH DAILY 90 tablet 2    aspirin (ECOTRIN LOW STRENGTH) 81 mg EC tablet Take 81 mg by mouth daily      atorvastatin (LIPITOR) 20 mg tablet 20 mg in the morning budesonide-formoterol (SYMBICORT) 160-4.5 mcg/act inhaler Inhale 2 puffs 2 (two) times a day Rinse mouth after use. 10.2 g 0    buprenorphine-naloxone (Suboxone) 8-2 mg Place 1 Film (8 mg total) under the tongue 2 (two) times a day 60 Film 0    buPROPion (WELLBUTRIN) 75 mg tablet Take 75 mg by mouth once      busPIRone (BUSPAR) 10 mg tablet Take 10 mg by mouth 2 (two) times a day      cholecalciferol (VITAMIN D3) 1,000 units tablet Take 1,000 Units by mouth daily      hydrocortisone (ANUSOL-HC) 2.5 % rectal cream Apply rectally 2 times daily 28.35 g 0    ibuprofen (MOTRIN) 600 mg tablet Take 1 tablet (600 mg total) by mouth every 8 (eight) hours as needed for mild pain 90 tablet 1    traZODone (DESYREL) 50 mg tablet Take 1 tablet (50 mg total) by mouth daily at bedtime 30 tablet 3    amLODIPine (NORVASC) 5 mg tablet Take 1 tablet (5 mg total) by mouth daily Do not start before February 19, 2023. 30 tablet 0    hydrochlorothiazide (HYDRODIURIL) 25 mg tablet Take 1 tablet (25 mg total) by mouth daily 30 tablet 0    ipratropium (ATROVENT) 0.02 % nebulizer solution Take 1 vial (0.5 mg total) by nebulization 4 (four) times a day (Patient not taking: Reported on 11/29/2023) 25 vial 0    naproxen (EC NAPROSYN) 500 MG EC tablet Take 1 tablet (500 mg total) by mouth 2 (two) times a day with meals (Patient not taking: Reported on 6/11/2021) 10 tablet 0    omeprazole (PriLOSEC) 20 mg delayed release capsule Take 20 mg by mouth daily (Patient not taking: Reported on 11/29/2023)      phenazopyridine (PYRIDIUM) 200 mg tablet Take 1 tablet (200 mg total) by mouth 3 (three) times a day (Patient not taking: Reported on 9/26/2023) 6 tablet 0    polyethylene glycol (MIRALAX) 17 g packet Take 17 g by mouth daily for 28 days (Patient not taking: Reported on 6/11/2021) 28 each 0     No current facility-administered medications for this visit.        (Not in a hospital admission)      Objective:     24 Hour Vitals Assessment:     Vitals: 11/29/23 1325   BP: 142/82   Pulse: (!) 108   Resp: 18   Temp: (!) 97.1 °F (36.2 °C)   SpO2: 97%       PHYSICIAN EXAM:    General: Appearance: alert, cooperative, no distress. HEENT: Normocephalic, atraumatic. No scleral icterus. conjunctivae clear. EOMI. Chest: No tenderness to palpation. No open wound noted. Lungs: Clear to auscultation bilaterally, Respirations unlabored. Cardiac: Tachycardia, +H4LFO S2, systolic murmur noted  Abdomen: Soft, non-tender, non-distended. Bowel sounds are normal.  Extremities:  No edema, cyanosis, clubbing. Deferred breast exam  Skin: Skin color, turgor are normal. No rashes. Neurologic: Awake, Alert, and oriented, no gross focal deficits noted b/l. DATA REVIEW:    Pathology Result:    Final Diagnosis   Date Value Ref Range Status   05/11/2023   Final    A. Colon, ulcer, recto sigmoid, biopsy:  - Benign colonic mucosa with focal mild active colitis and regenerative epithelial changes. Image Results:   Image result are reviewed and documented in Hematology/Oncology history    Colonoscopy  Narrative: Table formatting from the original result was not included. [address]  [phone]    DATE OF SERVICE:  5/11/23    PHYSICIAN(S):  Attending:   Bakari Gomez MD     Fellow:   No Staff Documented     INDICATION:  Abnormal feces    POST-OP DIAGNOSIS:  See the impression below. HISTORY:  Prior colonoscopy: No prior colonoscopy. BOWEL PREPARATION:  Miralax/Dulcolax; Golytely/Colyte/Trilyte    PREPROCEDURE:  Informed consent was obtained for the procedure, including sedation. Risks   including but not limited to bleeding, infection, perforation, adverse   drug reaction and aspiration were explained in detail. Also explained   about less than 100% sensitivity with the exam and other alternatives. The   patient was placed in the left lateral decubitus position.     Procedure: Colonoscopy DETAILS OF PROCEDURE:   Patient was taken to the procedure room where a time out was performed to   confirm correct patient and correct procedure. The patient underwent   monitored anesthesia care, which was administered by an anesthesia   professional. The patient's blood pressure, heart rate, level of   consciousness, oxygen and respirations were monitored throughout the   procedure. A digital rectal exam was performed. The scope was introduced   through the anus and advanced to the cecum. Retroflexion was performed in   the rectum. The quality of bowel preparation was evaluated using the   [facility] Bowel Preparation Scale with scores of: right colon = 2, transverse   colon = 2, left colon = 2. The total BBPS score was 6. Bowel prep was   adequate. The patient experienced no blood loss. The procedure was not   difficult. The patient tolerated the procedure well. There were no   apparent complications. ANESTHESIA INFORMATION:  ASA: III  Anesthesia Type: IV Sedation with Anesthesia    MEDICATIONS:  No administrations occurring from 1120 to 1145 on 05/11/23     FINDINGS:  2 large, cratered, irregular ulcers in the sigmoid colon; performed cold   forceps biopsy    EVENTS:  Procedure Events   Event Event Time   ENDO CECUM REACHED 5/11/2023 11:33 AM   ENDO SCOPE OUT TIME 5/11/2023 11:45 AM     SPECIMENS:  ID Type Source Tests Collected by Time Destination   1 : Colonic ulcer, recto sigmoid, cold bx Tissue Colon TISSUE EXAM   Snow Page MD 5/11/2023 11:42 AM      EQUIPMENT:  Colonoscope - [id number]  Impression: Ulcers in the sigmoid/rectosigmoid colon s/p biopsy. RECOMMENDATION:   Repeat screening colonoscopy in 10 years               Snow Page MD       LABS:  Lab data are reviewed and documented in [facility] history.        Lab Results   Component Value Date    HGB 11.2 (L) 02/18/2023    HCT 33.9 (L) 02/18/2023    MCV 91 02/18/2023     02/18/2023    WBC 9.82 02/18/2023    NRBC 0 02/16/2023     Lab Results   Component Value Date     07/30/2014    K 3.5 02/18/2023     02/18/2023    CO2 26 02/18/2023    ANIONGAP 8 07/30/2014    BUN 15 02/18/2023    CREATININE 0.68 02/18/2023    GLUCOSE 102 07/30/2014    GLUF 78 10/01/2018    GLUF 78 10/01/2018    CALCIUM 8.9 02/18/2023    AST 38 (H) 02/17/2023    ALT 35 (H) 02/17/2023    ALKPHOS 85 02/17/2023    PROT 6.7 07/30/2014    BILITOT 0.18 (L) 07/30/2014    EGFR 96 02/18/2023       Lab Results   Component Value Date    IRON 62 10/01/2018    TIBC 405 10/01/2018    FERRITIN 111 10/01/2018    FERRITIN 70 11/20/2017       Lab Results   Component Value Date    ETDTBYTZ67 378 10/01/2018    DVMFTOAH68 405 11/20/2017       No results for input(s): "WBC", "CREAT", "PLT" in the last 72 hours.     By:  Jaycee Jolley MD, 11/29/2023, 1:36 PM

## 2023-11-29 ENCOUNTER — OFFICE VISIT (OUTPATIENT)
Dept: HEMATOLOGY ONCOLOGY | Facility: CLINIC | Age: 58
End: 2023-11-29
Payer: MEDICARE

## 2023-11-29 VITALS
HEIGHT: 62 IN | WEIGHT: 142 LBS | RESPIRATION RATE: 18 BRPM | TEMPERATURE: 97.1 F | BODY MASS INDEX: 26.13 KG/M2 | SYSTOLIC BLOOD PRESSURE: 142 MMHG | DIASTOLIC BLOOD PRESSURE: 82 MMHG | HEART RATE: 108 BPM | OXYGEN SATURATION: 97 %

## 2023-11-29 DIAGNOSIS — C50.911 MALIGNANT NEOPLASM OF RIGHT BREAST IN FEMALE, ESTROGEN RECEPTOR POSITIVE, UNSPECIFIED SITE OF BREAST: ICD-10-CM

## 2023-11-29 DIAGNOSIS — Z85.3 HISTORY OF CANCER OF RIGHT BREAST: Primary | ICD-10-CM

## 2023-11-29 DIAGNOSIS — Z17.0 MALIGNANT NEOPLASM OF RIGHT BREAST IN FEMALE, ESTROGEN RECEPTOR POSITIVE, UNSPECIFIED SITE OF BREAST: ICD-10-CM

## 2023-11-29 DIAGNOSIS — Z79.811 AROMATASE INHIBITOR USE: ICD-10-CM

## 2023-11-29 PROCEDURE — 99215 OFFICE O/P EST HI 40 MIN: CPT | Performed by: INTERNAL MEDICINE

## 2024-01-04 ENCOUNTER — HOSPITAL ENCOUNTER (OUTPATIENT)
Dept: RADIOLOGY | Facility: IMAGING CENTER | Age: 59
End: 2024-01-04
Payer: MEDICARE

## 2024-01-04 DIAGNOSIS — Z79.811 AROMATASE INHIBITOR USE: ICD-10-CM

## 2024-01-04 DIAGNOSIS — Z85.3 HISTORY OF CANCER OF RIGHT BREAST: ICD-10-CM

## 2024-01-04 PROCEDURE — 77080 DXA BONE DENSITY AXIAL: CPT

## 2024-02-21 PROBLEM — Z12.11 SCREENING FOR MALIGNANT NEOPLASM OF COLON: Status: RESOLVED | Noted: 2020-09-25 | Resolved: 2024-02-21

## 2024-06-10 ENCOUNTER — TELEPHONE (OUTPATIENT)
Dept: HEMATOLOGY ONCOLOGY | Facility: CLINIC | Age: 59
End: 2024-06-10

## 2024-06-10 NOTE — TELEPHONE ENCOUNTER
Pt's appt needs to be rescheduled d/t provider not being in the office. Attempted to reach patient about need of appointment change with no success. Appointment canceled and detailed VM left with [facility] number [phone] for patient to return call to reschedule.     Additional message sent Via [employer].

## 2024-06-10 NOTE — TELEPHONE ENCOUNTER
Left detailed message with time, date and location of follow up appointment. Unable to send mychart message do to patient not having mychart active. Patient has [facility] number for rescheduling.

## 2024-11-30 PROBLEM — M85.852 OSTEOPENIA OF LEFT FEMORAL NECK: Status: ACTIVE | Noted: 2024-11-30

## 2024-11-30 NOTE — PROGRESS NOTES
Hematology/Oncology Outpatient Office Note    Date of Service: 12/10/2024    [facility] [place]  [address] [place] [id number]  [phone]    Reason for Consultation:   Chief Complaint   Patient presents with    Follow-up       Cancer Stage at diagnosis: IIB    Referral Physician: No ref. provider found    Primary Care Physician:  YULI Ruth     Nickname: Vanessa    Lives alone    Original ECO    Today's ECO    Goals and Barriers:  Current Goal: Minimize effects of disease burden, extend life.   Barriers to accomplishing this: None    Patient's Capacity to Self Care:  Patient is able to self care      ASSESSMENT & PLAN      Diagnosis ICD-10-CM Associated Orders   1. History of cancer of right breast  Z85.3 Ambulatory referral to Survivorship Program      2. Osteopenia of left femoral neck  M85.852             This is a 58 y.o. c PMHx notable for post-menopause, opioid use disorder s/p Suboxone and now on Methadone, asthma, HTN, anxiety/depression, being seen in consultation for HR+ early stage breast cancer on adjuvant endocrine therapy     She was diagnosed 10/16/2013 and underwent R mastectomy and axillary LAD dissection. Oncotype dx was 59 and she had adjuvant AC followed by taxol completed 2014. Tamoxifen was given for 5 years until 2019 and then changed to Anastrozole and she completed 10 years 2024      Discussion of decision making  Oncology history updated, accordingly, during this visit  Goals of care/patient communication  I discussed with the patient the clinical course leading up to their cancer diagnosis. I reviewed relevant office notes, imaging reports and pathology result as well.  I told the patient that this is a case of curable disease and what this means. We discussed that the goal of anti-cancer therapy is to provide best quality of life, extend overall survival, and progression free survival as  shown in clinical trials. We also discussed that there might be a point when the cancer will no longer respond to this anti-neoplastic therapy  I explained the risks/benefits of the proposed cancer therapy: Anastrozole and after discussion including understanding risks of possible life-threatening complications and therapy-related malignancy development, informed consent for blood products and treatment has been verbally obtained.  TNM/Staging At Diagnosis   [id number]  Cancer Staging   IIB  Disease Features/Tumor Markers/Genetics  Tumor Marker: n/a  Notable Path Features:   Treatment: Anastrozole  Other Supportive care:   Treatment Team Members  Surgeon  Rad Onc  Palliative  Labs  Diagnostics  1/4/2024: DEXA L Femoral Neck T -2, osteopenia      Discussion of decision making    I personally reviewed the following lab results, the image studies, pathology, other specialty/physicians consult notes and recommendations, and outside medical records. I had a lengthy discussion with the patient and shared the work-up findings. We discussed the diagnosis and management plan as below. I spent 31 minutes reviewing the records (labs, clinician notes, outside records, medical history, ordering medicine/tests/procedures, interpreting the imaging/labs previously done) and coordination of care as well as direct time with the patient today, of which greater than 50% of the time was spent in counseling and coordination of care with the patient/family.      Plan/Labs  f/u with Surg-Onc or survivorship clinic for surveillance  F/u PCP for Calcium 1200 mg/Vit D 1000 IU supplements and weight bearing exercises          Follow Up: prn    All questions were answered to the patient's satisfaction during this encounter. The patient knows the contact information for our office and knows to reach out for any relevant concerns related to this encounter. They are to call for any temperature 100.4 or higher, new symptoms including but not  restricted to shaking chills, decreased appetite, nausea, vomiting, diarrhea, increased fatigue, shortness of breath or chest pain, confusion, and not feeling the strength to come to the clinic. For all other listed problems and medical diagnosis in their chart - they are managed by PCP and/or other specialists, which the patient acknowledges. Thank you very much for your consultation and making us a part of this patient's care. We are continuing to follow closely with you. Please do not hesitate to reach out to me with any additional questions or concerns.    Neftali Jones MD  Hematology & Medical Oncology Staff Physician             Disclaimer: This document was prepared using [employer] technology. If a word or phrase is confusing, or does not make sense, this is likely due to recognition error which was not discovered during this clinician's review. If you believe an error has occurred, please contact me through [facility] service line for clifford?cation.      ONCOLOGY HISTORY OF PRESENT ILLNESS        Oncology History    No history exists.         SUBJECTIVE  (INTERVAL HISTORY)      Clotting History None   Bleeding History None   Cancer History R breast   Family Cancer History None                         Pain: none  Doing well, no acute issues.  No nights sweats, hot flashes.    I have reviewed the relevant past medical, surgical, social and family history. I have also reviewed allergies and medications for this patient.    Review of Systems  Baseline weight: 140-145 lbs     A 10-point review of system was performed, pertinent positive and negative were detailed as above. Otherwise, the 10-point review of system was negative.      Past Medical History:   Diagnosis Date    Asthma     Breast cancer (HCC)     Cancer (HCC)     breast cancer    History of chemotherapy     Hypertension     Psychiatric disorder     depression and anxiety       Past Surgical History:   Procedure Laterality Date     MASTECTOMY  2014    Right side       Family History   Problem Relation Age of Onset    No Known Problems Mother     No Known Problems Father     No Known Problems Daughter     No Known Problems Maternal Grandmother     No Known Problems Maternal Grandfather     No Known Problems Paternal Grandmother     No Known Problems Paternal Grandfather     Colon cancer Neg Hx        Social History     Socioeconomic History    Marital status: Single     Spouse name: Not on file    Number of children: Not on file    Years of education: Not on file    Highest education level: Not on file   Occupational History    Not on file   Tobacco Use    Smoking status: Some Days     Current packs/day: 0.00     Types: Cigarettes     Last attempt to quit: 2020     Years since quittin.8    Smokeless tobacco: Never   Vaping Use    Vaping status: Never Used   Substance and Sexual Activity    Alcohol use: Not Currently    Drug use: No    Sexual activity: Not Currently   Other Topics Concern    Not on file   Social History Narrative    Not on file     Social Drivers of Health     Financial Resource Strain: Not on file   Food Insecurity: Not on file   Transportation Needs: Not on file   Physical Activity: Not on file   Stress: Not on file   Social Connections: Not on file   Intimate Partner Violence: Not on file   Housing Stability: Not on file       No Known Allergies    Current Outpatient Medications   Medication Sig Dispense Refill    albuterol (2.5 mg/3 mL) 0.083 % nebulizer solution Take 1 vial (2.5 mg total) by nebulization every 6 (six) hours as needed for wheezing or shortness of breath for up to 25 doses 75 mL 0    albuterol (ACCUNEB) 1.25 MG/3ML nebulizer solution Take 3 mL by nebulization every 6 (six) hours as needed for wheezing 75 mL 0    albuterol (PROVENTIL HFA,VENTOLIN HFA) 90 mcg/act inhaler Inhale 2 puffs every 6 (six) hours as needed for wheezing 1 Inhaler 0    anastrozole (ARIMIDEX) 1 mg tablet TAKE ONE TABLET BY MOUTH  DAILY 90 tablet 2    aspirin (ECOTRIN LOW STRENGTH) 81 mg EC tablet Take 81 mg by mouth daily      atorvastatin (LIPITOR) 20 mg tablet 20 mg in the morning      budesonide-formoterol (SYMBICORT) 160-4.5 mcg/act inhaler Inhale 2 puffs 2 (two) times a day Rinse mouth after use. 10.2 g 0    buprenorphine-naloxone (Suboxone) 8-2 mg Place 1 Film (8 mg total) under the tongue 2 (two) times a day 60 Film 0    buPROPion (WELLBUTRIN) 75 mg tablet Take 75 mg by mouth once      busPIRone (BUSPAR) 10 mg tablet Take 10 mg by mouth 2 (two) times a day      calcium carbonate (OYSTER SHELL,OSCAL) 500 mg       cephalexin (KEFLEX) 500 mg capsule       cholecalciferol (VITAMIN D3) 1,000 units tablet Take 1,000 Units by mouth daily      hydrocortisone (ANUSOL-HC) 2.5 % rectal cream Apply rectally 2 times daily 28.35 g 0    ibuprofen (MOTRIN) 600 mg tablet Take 1 tablet (600 mg total) by mouth every 8 (eight) hours as needed for mild pain 90 tablet 1    amLODIPine (NORVASC) 5 mg tablet Take 1 tablet (5 mg total) by mouth daily Do not start before February 19, 2023. 30 tablet 0    hydrochlorothiazide (HYDRODIURIL) 25 mg tablet Take 1 tablet (25 mg total) by mouth daily 30 tablet 0    ipratropium (ATROVENT) 0.02 % nebulizer solution Take 1 vial (0.5 mg total) by nebulization 4 (four) times a day (Patient not taking: Reported on 11/29/2023) 25 vial 0    naproxen (EC NAPROSYN) 500 MG EC tablet Take 1 tablet (500 mg total) by mouth 2 (two) times a day with meals (Patient not taking: Reported on 6/11/2021) 10 tablet 0    omeprazole (PriLOSEC) 20 mg delayed release capsule Take 20 mg by mouth daily (Patient not taking: Reported on 11/29/2023)      phenazopyridine (PYRIDIUM) 200 mg tablet Take 1 tablet (200 mg total) by mouth 3 (three) times a day (Patient not taking: Reported on 9/26/2023) 6 tablet 0    polyethylene glycol (MIRALAX) 17 g packet Take 17 g by mouth daily for 28 days (Patient not taking: Reported on 6/11/2021) 28 each 0     traZODone (DESYREL) 50 mg tablet Take 1 tablet (50 mg total) by mouth daily at bedtime 30 tablet 3     No current facility-administered medications for this visit.       (Not in a hospital admission)      Objective:     24 Hour Vitals Assessment:     Vitals:    12/10/24 1339   BP: 132/72   Pulse: (!) 107   Resp: 18   Temp: 97.7 °F (36.5 °C)   SpO2: 97%         PHYSICIAN EXAM:    General: Appearance: alert, cooperative, no distress.  HEENT: Normocephalic, atraumatic. No scleral icterus. conjunctivae clear. EOMI.  Chest: No tenderness to palpation. No open wound noted.  Lungs: Clear to auscultation bilaterally, Respirations unlabored.  Cardiac: Tachycardia, +S1and S2, systolic murmur noted  Abdomen: Soft, non-tender, non-distended. Bowel sounds are normal.  Extremities:  No edema, cyanosis, clubbing.  Deferred breast exam  Skin: Skin color, turgor are normal. No rashes.  Neurologic: Awake, Alert, and oriented, no gross focal deficits noted b/l.       DATA REVIEW:    Pathology Result:    Final Diagnosis   Date Value Ref Range Status   05/11/2023   Final    A. Colon, ulcer, recto sigmoid, biopsy:  - Benign colonic mucosa with focal mild active colitis and regenerative epithelial changes.          Image Results:   Image result are reviewed and documented in Hematology/Oncology history    DXA bone density spine hip and pelvis  Narrative: DXA SCAN    CLINICAL HISTORY: 58 years postmenopausal female.  OTHER RISK FACTORS: Tobacco use, PPI therapy, Arimidex therapy.    PHARMACOLOGIC THERAPY FOR OSTEOPOROSIS:  None.    TECHNIQUE: Bone densitometry was performed using a HoloSparkBase W bone densitometer.  Regions of interest appear properly placed.    COMPARISON: There are no prior DXA studies performed on this unit for comparison.    RESULTS:    LUMBAR SPINE  Level: L1, L2, L4  (L3 vertebra excluded from analysis due to local structural abnormalities or artifact):  BMD: 0.813 gm/cm2  T-score: -2.0    LEFT  TOTAL HIP:  BMD:  0.769 gm/cm2  T-score: -1.4    LEFT  FEMORAL NECK:  BMD: 0.626 gm/cm2  T score: -2.0  Impression: 1. Low bone mass (osteopenia).    2.  The 10 year risk of hip fracture is 1.1% with the 10 year risk of major osteoporotic fracture being 5.4% as calculated by the [employer] fracture risk assessment tool (FRAX, which is based on data generated by the WHO Collaborating   [employer] for Metabolic Bone Diseases).    3.  The current NOF guidelines recommend treating patients with a T-score of -2.5 or less in the lumbar spine or hips, or in post-menopausal women and men over the age of 50 with low bone mass (osteopenia) and a FRAX 10 year risk score of >3% for hip   fracture and/or >20% for major osteoporotic fracture.    4.  The NOF recommends follow-up DXA in 1-2 years after initiating therapy for osteoporosis and every 2 years thereafter. More frequent evaluation is appropriate for patients with conditions associated with rapid bone loss, such as glucocorticoid   therapy. The interval between DXA screenings may be longer for individuals without major risk factors and initial T-score in the normal or upper low bone mass range.    The FRAX algorithm has certain limitations:  -FRAX has not been validated in patients currently or previously treated with pharmacotherapy for osteoporosis.  In such patients, clinical judgment must be exercised in interpreting FRAX scores.  -Prior hip, vertebral and humeral fragility fractures appear to confer greater risk of subsequent fracture than fractures at other sites (this is especially true for individuals with severe vertebral fractures), but quantification of this incremental   risk is not possible with FRAX.  -FRAX underestimates fracture risk in patients with history of multiple fragility fractures.  -FRAX may underestimate fracture risk in patients with history of frequent falls.  -It is not appropriate to use FRAX to monitor treatment response.    WHO  "CLASSIFICATION:  Normal (a T-score of -1.0 or higher)  Low bone mineral density (a T-score of less than -1.0 but higher than -2.5)  Osteoporosis (a T-score of -2.5 or less)  Severe osteoporosis (a T-score of -2.5 or less with a fragility fracture)    Workstation performed: [id number]      LABS:  Lab data are reviewed and documented in [facility] history.       Lab Results   Component Value Date    HGB 11.2 (L) 02/18/2023    HCT 33.9 (L) 02/18/2023    MCV 91 02/18/2023     02/18/2023    WBC 9.82 02/18/2023    NRBC 0 02/16/2023     Lab Results   Component Value Date     07/30/2014    K 3.5 02/18/2023     02/18/2023    CO2 26 02/18/2023    ANIONGAP 8 07/30/2014    BUN 15 02/18/2023    CREATININE 0.68 02/18/2023    GLUCOSE 102 07/30/2014    GLUF 78 10/01/2018    GLUF 78 10/01/2018    CALCIUM 8.9 02/18/2023    AST 38 (H) 02/17/2023    ALT 35 (H) 02/17/2023    ALKPHOS 85 02/17/2023    PROT 6.7 07/30/2014    BILITOT 0.18 (L) 07/30/2014    EGFR 96 02/18/2023       Lab Results   Component Value Date    IRON 62 10/01/2018    TIBC 405 10/01/2018    FERRITIN 111 10/01/2018    FERRITIN 70 11/20/2017       Lab Results   Component Value Date    FGUOLGRB14 509 10/01/2018    UJPNAGXJ57 405 11/20/2017       No results for input(s): \"WBC\", \"CREAT\", \"PLT\" in the last 72 hours.    By:  Neftali Jones MD, 12/10/2024, 1:50 PM                                  "

## 2024-12-05 ENCOUNTER — TELEPHONE (OUTPATIENT)
Age: 59
End: 2024-12-05

## 2024-12-05 NOTE — TELEPHONE ENCOUNTER
Patient calling in to request transportation to Dr Jones's appt on 12/10 again. She would like to remind that she is currently staying at her daughter's house at [address], [place] and would need to be picked up there. She would like assistance in adding that address as a temporary address to her chart at her next appt. Please call her back once star is arranged. Best # [phone]

## 2024-12-06 NOTE — ED NOTES
Pt Evaluated, treated, and [facility]  02/13/20 [id number] PROCEDURES:  Operative hysteroscopy with fluid management system 06-Dec-2024 14:30:18  Gabrielle Vaca  Dilation and curettage, uterus 06-Dec-2024 14:30:29  Gabrielle Vaca  Polypectomy, uterus 06-Dec-2024 14:30:39  Gabrielle Vaca

## 2024-12-10 ENCOUNTER — OFFICE VISIT (OUTPATIENT)
Dept: HEMATOLOGY ONCOLOGY | Facility: CLINIC | Age: 59
End: 2024-12-10
Payer: MEDICARE

## 2024-12-10 VITALS
RESPIRATION RATE: 18 BRPM | HEART RATE: 107 BPM | BODY MASS INDEX: 30.55 KG/M2 | TEMPERATURE: 97.7 F | OXYGEN SATURATION: 97 % | HEIGHT: 62 IN | SYSTOLIC BLOOD PRESSURE: 132 MMHG | WEIGHT: 166 LBS | DIASTOLIC BLOOD PRESSURE: 72 MMHG

## 2024-12-10 DIAGNOSIS — Z85.3 HISTORY OF CANCER OF RIGHT BREAST: Primary | ICD-10-CM

## 2024-12-10 DIAGNOSIS — M85.852 OSTEOPENIA OF LEFT FEMORAL NECK: ICD-10-CM

## 2024-12-10 PROCEDURE — 99214 OFFICE O/P EST MOD 30 MIN: CPT | Performed by: INTERNAL MEDICINE

## 2024-12-10 RX ORDER — CEPHALEXIN 500 MG/1
CAPSULE ORAL
COMMUNITY
Start: 2024-11-19

## 2024-12-10 RX ORDER — CALCIUM CARBONATE 500(1250)
TABLET ORAL
COMMUNITY
Start: 2024-11-25

## 2025-05-30 ENCOUNTER — APPOINTMENT (OUTPATIENT)
Dept: LAB | Facility: CLINIC | Age: 60
End: 2025-05-30
Attending: NURSE PRACTITIONER
Payer: MEDICARE

## 2025-05-30 DIAGNOSIS — I67.9 CEREBROVASCULAR DISEASE, UNSPECIFIED: ICD-10-CM

## 2025-05-30 DIAGNOSIS — M85.80 OTHER SPECIFIED DISORDERS OF BONE DENSITY AND STRUCTURE, UNSPECIFIED SITE: ICD-10-CM

## 2025-05-30 DIAGNOSIS — I10 ESSENTIAL (PRIMARY) HYPERTENSION: ICD-10-CM

## 2025-05-30 LAB
25(OH)D3 SERPL-MCNC: 51.7 NG/ML (ref 30–100)
ALBUMIN SERPL BCG-MCNC: 3.9 G/DL (ref 3.5–5)
ALP SERPL-CCNC: 84 U/L (ref 34–104)
ALT SERPL W P-5'-P-CCNC: 11 U/L (ref 7–52)
ANION GAP SERPL CALCULATED.3IONS-SCNC: 10 MMOL/L (ref 4–13)
AST SERPL W P-5'-P-CCNC: 16 U/L (ref 13–39)
BILIRUB SERPL-MCNC: 0.5 MG/DL (ref 0.2–1)
BUN SERPL-MCNC: 17 MG/DL (ref 5–25)
CALCIUM SERPL-MCNC: 9.3 MG/DL (ref 8.4–10.2)
CHLORIDE SERPL-SCNC: 95 MMOL/L (ref 96–108)
CHOLEST SERPL-MCNC: 119 MG/DL (ref ?–200)
CO2 SERPL-SCNC: 29 MMOL/L (ref 21–32)
CREAT SERPL-MCNC: 1.09 MG/DL (ref 0.6–1.3)
ERYTHROCYTE [DISTWIDTH] IN BLOOD BY AUTOMATED COUNT: 13.2 % (ref 11.6–15.1)
GFR SERPL CREATININE-BSD FRML MDRD: 54 ML/MIN/1.73SQ M
GLUCOSE P FAST SERPL-MCNC: 141 MG/DL (ref 65–99)
HCT VFR BLD AUTO: 37.6 % (ref 34.8–46.1)
HDLC SERPL-MCNC: 37 MG/DL
HGB BLD-MCNC: 12.4 G/DL (ref 11.5–15.4)
LDLC SERPL CALC-MCNC: 57 MG/DL (ref 0–100)
MCH RBC QN AUTO: 31.4 PG (ref 26.8–34.3)
MCHC RBC AUTO-ENTMCNC: 33 G/DL (ref 31.4–37.4)
MCV RBC AUTO: 95 FL (ref 82–98)
NONHDLC SERPL-MCNC: 82 MG/DL
PLATELET # BLD AUTO: 230 THOUSANDS/UL (ref 149–390)
PMV BLD AUTO: 11.2 FL (ref 8.9–12.7)
POTASSIUM SERPL-SCNC: 3.3 MMOL/L (ref 3.5–5.3)
PROT SERPL-MCNC: 7.5 G/DL (ref 6.4–8.4)
RBC # BLD AUTO: 3.95 MILLION/UL (ref 3.81–5.12)
SODIUM SERPL-SCNC: 134 MMOL/L (ref 135–147)
TRIGL SERPL-MCNC: 123 MG/DL (ref ?–150)
WBC # BLD AUTO: 7.84 THOUSAND/UL (ref 4.31–10.16)

## 2025-05-30 PROCEDURE — 82306 VITAMIN D 25 HYDROXY: CPT

## 2025-05-30 PROCEDURE — 80053 COMPREHEN METABOLIC PANEL: CPT

## 2025-05-30 PROCEDURE — 85027 COMPLETE CBC AUTOMATED: CPT

## 2025-05-30 PROCEDURE — 36415 COLL VENOUS BLD VENIPUNCTURE: CPT

## 2025-05-30 PROCEDURE — 80061 LIPID PANEL: CPT
